# Patient Record
Sex: FEMALE | Race: WHITE | NOT HISPANIC OR LATINO | Employment: UNEMPLOYED | URBAN - METROPOLITAN AREA
[De-identification: names, ages, dates, MRNs, and addresses within clinical notes are randomized per-mention and may not be internally consistent; named-entity substitution may affect disease eponyms.]

---

## 2018-04-23 LAB
ALBUMIN SERPL BCP-MCNC: 4.6 G/DL (ref 3.5–5.7)
ALP SERPL-CCNC: 76 IU/L (ref 40–150)
ALT SERPL W P-5'-P-CCNC: 13 IU/L (ref 0–50)
ANION GAP SERPL CALCULATED.3IONS-SCNC: 12.1 MM/L
AST SERPL W P-5'-P-CCNC: 13 U/L (ref 7–26)
BASOPHILS # BLD AUTO: 0.1 X3/UL (ref 0–0.3)
BASOPHILS # BLD AUTO: 0.9 % (ref 0–2)
BILIRUB SERPL-MCNC: 0.5 MG/DL (ref 0.3–1)
BILIRUBIN DIRECT (HISTORICAL): 0.1 MG/DL (ref 0–0.2)
BUN SERPL-MCNC: 9 MG/DL (ref 7–25)
CALCIUM SERPL-MCNC: 9.4 MG/DL (ref 8.6–10.5)
CHLORIDE SERPL-SCNC: 102 MM/L (ref 98–107)
CHOLEST SERPL-MCNC: 223 MG/DL (ref 0–200)
CO2 SERPL-SCNC: 29 MM/L (ref 21–31)
CREAT SERPL-MCNC: 0.81 MG/DL (ref 0.6–1.2)
DEPRECATED RDW RBC AUTO: 15.2 % (ref 11.5–14.5)
EGFR (HISTORICAL): > 60 GFR
EGFR AFRICAN AMERICAN (HISTORICAL): > 60 GFR
EOSINOPHIL # BLD AUTO: 0.2 X3/UL (ref 0–0.5)
EOSINOPHIL NFR BLD AUTO: 1.9 % (ref 0–5)
EST. AVERAGE GLUCOSE BLD GHB EST-MCNC: 128 MG/DL
GLUCOSE (HISTORICAL): 115 MG/DL (ref 65–99)
HBA1C MFR BLD HPLC: 6.1 % (ref 4–6.2)
HCT VFR BLD AUTO: 39.8 % (ref 37–47)
HDLC SERPL-MCNC: 43 MG/DL (ref 40–60)
HGB BLD-MCNC: 12.9 G/DL (ref 12–16)
LDLC SERPL CALC-MCNC: 154.5 MG/DL (ref 75–193)
LYMPHOCYTES # BLD AUTO: 1.8 X3/UL (ref 1.2–4.2)
LYMPHOCYTES NFR BLD AUTO: 20.4 % (ref 20.5–51.1)
MCH RBC QN AUTO: 26.7 PG (ref 26–34)
MCHC RBC AUTO-ENTMCNC: 32.4 G/DL (ref 31–36)
MCV RBC AUTO: 82.2 FL (ref 81–99)
MONOCYTES # BLD AUTO: 0.5 X3/UL (ref 0–1)
MONOCYTES NFR BLD AUTO: 5.4 % (ref 1.7–12)
NEUTROPHILS # BLD AUTO: 6.2 X3/UL (ref 1.4–6.5)
NEUTS SEG NFR BLD AUTO: 71.4 % (ref 42.2–75.2)
OSMOLALITY, SERUM (HISTORICAL): 277 MOSM (ref 262–291)
PLATELET # BLD AUTO: 346 X3/UL (ref 130–400)
PMV BLD AUTO: 7.4 FL (ref 8.6–11.7)
POTASSIUM SERPL-SCNC: 4.1 MM/L (ref 3.5–5.5)
RBC # BLD AUTO: 4.84 X6/UL (ref 3.9–5.2)
SODIUM SERPL-SCNC: 139 MM/L (ref 134–143)
T3 SERPL-MCNC: 108.9 NG/DL (ref 87–178)
T4 TOTAL (HISTORICAL): 5.52 UG/DL (ref 6.1–12.2)
TOTAL PROTEIN (HISTORICAL): 8.2 G/DL (ref 6.4–8.9)
TRIGL SERPL-MCNC: 127 MG/DL (ref 44–166)
TSH SERPL DL<=0.05 MIU/L-ACNC: > 49.6 UIU/M (ref 0.45–5.33)
VLDL CHOLESTEROL (HISTORICAL): 25 MG/DL (ref 5–51)
WBC # BLD AUTO: 8.7 X3/UL (ref 4.8–10.8)

## 2018-05-01 ENCOUNTER — LAB REQUISITION (OUTPATIENT)
Dept: LAB | Facility: HOSPITAL | Age: 33
End: 2018-05-01
Payer: COMMERCIAL

## 2018-05-01 DIAGNOSIS — R92.8 OTHER ABNORMAL AND INCONCLUSIVE FINDINGS ON DIAGNOSTIC IMAGING OF BREAST: ICD-10-CM

## 2018-05-01 LAB — SURGICAL PATHOLOGY (HISTORICAL): NORMAL

## 2018-05-01 PROCEDURE — 88305 TISSUE EXAM BY PATHOLOGIST: CPT | Performed by: PATHOLOGY

## 2018-08-15 PROBLEM — J45.909 ASTHMA: Status: ACTIVE | Noted: 2018-08-15

## 2018-08-15 PROBLEM — I10 HYPERTENSION: Status: ACTIVE | Noted: 2018-08-15

## 2018-08-15 PROBLEM — M19.90 ARTHRITIS: Status: ACTIVE | Noted: 2018-08-15

## 2018-08-15 PROBLEM — K21.9 GERD (GASTROESOPHAGEAL REFLUX DISEASE): Status: ACTIVE | Noted: 2018-08-15

## 2018-08-15 RX ORDER — FLUTICASONE PROPIONATE 50 MCG
1 SPRAY, SUSPENSION (ML) NASAL DAILY PRN
COMMUNITY
End: 2019-07-23

## 2018-08-15 RX ORDER — LISINOPRIL 10 MG/1
10 TABLET ORAL DAILY
COMMUNITY
End: 2019-07-23

## 2018-08-15 RX ORDER — NAPROXEN 500 MG/1
500 TABLET ORAL DAILY PRN
COMMUNITY
End: 2019-07-23

## 2018-08-15 RX ORDER — PANTOPRAZOLE SODIUM 40 MG/1
40 TABLET, DELAYED RELEASE ORAL DAILY
COMMUNITY
End: 2018-09-26 | Stop reason: SDUPTHER

## 2018-08-15 RX ORDER — ATORVASTATIN CALCIUM 10 MG/1
10 TABLET, FILM COATED ORAL DAILY
COMMUNITY
End: 2018-09-26 | Stop reason: SDUPTHER

## 2018-08-15 RX ORDER — LEVOTHYROXINE SODIUM 0.2 MG/1
200 TABLET ORAL DAILY
COMMUNITY
End: 2018-09-26 | Stop reason: SDUPTHER

## 2018-08-15 RX ORDER — AMLODIPINE BESYLATE 10 MG/1
10 TABLET ORAL DAILY
COMMUNITY
End: 2018-09-26 | Stop reason: SDUPTHER

## 2018-08-15 RX ORDER — PAROXETINE HYDROCHLORIDE 40 MG/1
40 TABLET, FILM COATED ORAL DAILY
COMMUNITY
End: 2019-07-23

## 2018-08-15 RX ORDER — MONTELUKAST SODIUM 10 MG/1
10 TABLET ORAL
COMMUNITY
End: 2018-09-26 | Stop reason: SDUPTHER

## 2018-08-19 ENCOUNTER — OFFICE VISIT (OUTPATIENT)
Dept: URGENT CARE | Facility: CLINIC | Age: 33
End: 2018-08-19
Payer: COMMERCIAL

## 2018-08-19 VITALS
BODY MASS INDEX: 43.39 KG/M2 | WEIGHT: 270 LBS | OXYGEN SATURATION: 98 % | SYSTOLIC BLOOD PRESSURE: 135 MMHG | HEIGHT: 66 IN | DIASTOLIC BLOOD PRESSURE: 81 MMHG | RESPIRATION RATE: 16 BRPM | TEMPERATURE: 98.1 F | HEART RATE: 81 BPM

## 2018-08-19 DIAGNOSIS — K08.89 PAIN, DENTAL: Primary | ICD-10-CM

## 2018-08-19 PROCEDURE — 99203 OFFICE O/P NEW LOW 30 MIN: CPT | Performed by: PHYSICIAN ASSISTANT

## 2018-08-19 PROCEDURE — S9088 SERVICES PROVIDED IN URGENT: HCPCS | Performed by: PHYSICIAN ASSISTANT

## 2018-08-19 RX ORDER — AMOXICILLIN 500 MG/1
500 CAPSULE ORAL EVERY 8 HOURS SCHEDULED
Qty: 21 CAPSULE | Refills: 0 | Status: SHIPPED | OUTPATIENT
Start: 2018-08-19 | End: 2018-08-26

## 2018-08-19 NOTE — PATIENT INSTRUCTIONS
Take medications as directed  Drink plenty of fluids  Follow up with family doctor this week  Go to ER immediately if new or worsening symptoms occur  Toothache   WHAT YOU NEED TO KNOW:   A toothache is pain that is caused by irritation of the nerves in the center of your tooth  The irritation may be caused by several problems, such as a cavity, an infection, a cracked tooth, or gum disease  It is very important to follow up with your dentist so the cause of your toothache can be diagnosed and treated  This can help prevent more serious problems  DISCHARGE INSTRUCTIONS:   Medicines: You may  need any of the following:  · NSAIDs  decrease swelling and pain  This medicine can be bought with or without a doctor's order  This medicine can cause stomach bleeding or kidney problems in certain people  If you take blood thinner medicine, always ask your healthcare provider if NSAIDs are safe for you  Always read the medicine label and follow the directions on it before using this medicine  · Acetaminophen  decreases pain  It is available without a doctor's order  Ask how much to take and how often to take it  Follow directions  Acetaminophen can cause liver damage if not taken correctly  · Pain medicine  may be given as a pill or as medicine that you put directly on your tooth or gums  Do not wait until the pain is severe before you take this medicine  · Antibiotics  help fight or prevent an infection caused by bacteria  Take them as directed  · Take your medicine as directed  Contact your healthcare provider if you think your medicine is not helping or if you have side effects  Tell him of her if you are allergic to any medicine  Keep a list of the medicines, vitamins, and herbs you take  Include the amounts, and when and why you take them  Bring the list or the pill bottles to follow-up visits  Carry your medicine list with you in case of an emergency    Follow up with your dentist as directed: You may be referred to a dental surgeon  Write down your questions so you remember to ask them during your visits  Self-care:   · Rinse your mouth with warm salt water 4 times a day or as directed  · You may need to eat soft foods to help relieve pain caused by chewing  Contact your dentist if:   · You have questions or concerns about your condition or care  Return to the emergency department if:   · You have trouble breathing  · You have swelling in your face or neck  · You have a fever and chills  · You have trouble speaking or swallowing  · You have trouble opening or closing your mouth  © 2017 [facility] Information is for End User's use only and may not be sold, redistributed or otherwise used for commercial purposes  All illustrations and images included in CareNotes® are the copyrighted property of [employer]  or [employer]  The above information is an  only  It is not intended as medical advice for individual conditions or treatments  Talk to your doctor, nurse or pharmacist before following any medical regimen to see if it is safe and effective for you

## 2018-08-19 NOTE — PROGRESS NOTES
[facility]        NAME: Vanessa Gutierrez is a 26 y o  female  : 1985    MRN: [id number]  DATE: 2018  TIME: 12:17 PM    Assessment and Plan   Pain, dental [K08 89]  1  Pain, dental  amoxicillin (AMOXIL) 500 mg capsule         Patient Instructions       Take medications as directed  Drink plenty of fluids  Follow up with family doctor this week  Go to ER immediately if new or worsening symptoms occur  Chief Complaint     Chief Complaint   Patient presents with    Fever    Earache     left, x 3 days         History of Present Illness       Earache    There is pain in the left ear  This is a new problem  The current episode started yesterday  The problem occurs constantly  The problem has been gradually worsening  The maximum temperature recorded prior to her arrival was 101 - 101 9 F  The fever has been present for less than 1 day  The pain is at a severity of 6/10  The pain is moderate  Pertinent negatives include no abdominal pain, coughing, diarrhea, ear discharge, headaches, hearing loss, neck pain, rash, rhinorrhea, sore throat or vomiting  Associated symptoms comments: L lower jaw toothache  She has tried nothing for the symptoms  Review of Systems   Review of Systems   Constitutional: Negative for chills, diaphoresis, fatigue and fever  HENT: Positive for dental problem and ear pain  Negative for congestion, ear discharge, hearing loss, rhinorrhea, sinus pain, sinus pressure, sneezing, sore throat, tinnitus and voice change  Eyes: Negative  Respiratory: Negative for cough, chest tightness and shortness of breath  Cardiovascular: Negative for chest pain and palpitations  Gastrointestinal: Negative for abdominal pain, constipation, diarrhea, nausea and vomiting  Endocrine: Negative  Genitourinary: Negative for dysuria  Musculoskeletal: Negative for back pain, myalgias and neck pain  Skin: Negative for pallor and rash  Allergic/Immunologic: Negative  Neurological: Negative for dizziness, syncope and headaches  Hematological: Negative  Psychiatric/Behavioral: Negative            Current Medications       Current Outpatient Prescriptions:     amLODIPine (NORVASC) 10 mg tablet, Take 10 mg by mouth daily, Disp: , Rfl:     atorvastatin (LIPITOR) 10 mg tablet, Take 10 mg by mouth daily, Disp: , Rfl:     fluticasone (FLONASE) 50 mcg/act nasal spray, 1 spray into each nostril daily as needed for rhinitis Each nostril, Disp: , Rfl:     Fluticasone Furoate-Vilanterol (BREO ELLIPTA IN), Inhale 100 mcg daily 1 puff at same time each day, Disp: , Rfl:     levothyroxine 200 mcg tablet, Take 200 mcg by mouth daily, Disp: , Rfl:     lisinopril (ZESTRIL) 10 mg tablet, Take 10 mg by mouth daily, Disp: , Rfl:     metFORMIN (GLUCOPHAGE) 500 mg tablet, Take 500 mg by mouth daily, Disp: , Rfl:     montelukast (SINGULAIR) 10 mg tablet, Take 10 mg by mouth daily at bedtime, Disp: , Rfl:     naproxen (NAPROSYN) 500 mg tablet, Take 500 mg by mouth daily as needed for mild pain, Disp: , Rfl:     pantoprazole (PROTONIX) 40 mg tablet, Take 40 mg by mouth daily, Disp: , Rfl:     PARoxetine (PAXIL) 40 MG tablet, Take 40 mg by mouth daily, Disp: , Rfl:     amoxicillin (AMOXIL) 500 mg capsule, Take 1 capsule (500 mg total) by mouth every 8 (eight) hours for 7 days, Disp: 21 capsule, Rfl: 0    Current Allergies     Allergies as of 08/19/2018 - Reviewed 08/19/2018   Allergen Reaction Noted    Other  08/15/2018    Vancomycin  08/15/2018            The following portions of the patient's history were reviewed and updated as appropriate: allergies, current medications, past family history, past medical history, past social history, past surgical history and problem list      Past Medical History:   Diagnosis Date    Acute sinus infection     Anxiety     Asthma     Depression     GERD (gastroesophageal reflux disease)     Heel spur     right    MRSA (methicillin resistant Staphylococcus aureus)     right leg & right leg inner thigh    Neck pain     Obesity     Sleep disturbance        Past Surgical History:   Procedure Laterality Date     SECTION      x1 live birth   [facility] THYROIDECTOMY  1       Family History   Problem Relation Age of Onset    Cervical cancer Mother     Hypertension Mother     Hypertension Father          Medications have been verified  Objective   /81   Pulse 81   Temp 98 1 °F (36 7 °C)   Resp 16   Ht 5' 6" (1 676 m)   Wt 122 kg (270 lb)   SpO2 98%   BMI 43 58 kg/m²        Physical Exam     Physical Exam   Constitutional: She appears well-developed and well-nourished  No distress  HENT:   Head: Normocephalic and atraumatic  Right Ear: Hearing, tympanic membrane, external ear and ear canal normal  Tympanic membrane is not erythematous, not retracted and not bulging  Left Ear: Hearing, tympanic membrane, external ear and ear canal normal  Tympanic membrane is not erythematous, not retracted and not bulging  Nose: Nose normal    Mouth/Throat: Oropharynx is clear and moist  No oropharyngeal exudate  Eyes: Conjunctivae are normal  Right eye exhibits no discharge  Left eye exhibits no discharge  Neck: Normal range of motion  Neck supple  Cardiovascular: Normal rate, regular rhythm, normal heart sounds and intact distal pulses  Pulmonary/Chest: Effort normal and breath sounds normal  No respiratory distress  She has no wheezes  She has no rales  Lymphadenopathy:     She has no cervical adenopathy  Skin: Skin is warm  No rash noted  She is not diaphoretic  Nursing note and vitals reviewed

## 2018-09-26 DIAGNOSIS — I10 ESSENTIAL HYPERTENSION: Primary | ICD-10-CM

## 2018-09-26 DIAGNOSIS — K21.00 GASTROESOPHAGEAL REFLUX DISEASE WITH ESOPHAGITIS: ICD-10-CM

## 2018-09-26 DIAGNOSIS — J45.909 ASTHMA, UNSPECIFIED ASTHMA SEVERITY, UNSPECIFIED WHETHER COMPLICATED, UNSPECIFIED WHETHER PERSISTENT: ICD-10-CM

## 2018-09-26 DIAGNOSIS — E78.00 HYPERCHOLESTEROLEMIA: ICD-10-CM

## 2018-09-26 DIAGNOSIS — E03.9 HYPOTHYROIDISM, UNSPECIFIED TYPE: ICD-10-CM

## 2018-09-26 RX ORDER — ATORVASTATIN CALCIUM 10 MG/1
10 TABLET, FILM COATED ORAL DAILY
Qty: 30 TABLET | Refills: 2 | Status: SHIPPED | OUTPATIENT
Start: 2018-09-26 | End: 2019-07-23

## 2018-09-26 RX ORDER — LEVOTHYROXINE SODIUM 0.2 MG/1
200 TABLET ORAL DAILY
Qty: 30 TABLET | Refills: 2 | Status: SHIPPED | OUTPATIENT
Start: 2018-09-26 | End: 2019-07-23

## 2018-09-26 RX ORDER — PANTOPRAZOLE SODIUM 40 MG/1
TABLET, DELAYED RELEASE ORAL
Qty: 30 TABLET | Refills: 3 | OUTPATIENT
Start: 2018-09-26

## 2018-09-26 RX ORDER — AMLODIPINE BESYLATE 10 MG/1
10 TABLET ORAL DAILY
Qty: 30 TABLET | Refills: 3 | Status: SHIPPED | OUTPATIENT
Start: 2018-09-26 | End: 2019-07-23

## 2018-09-26 RX ORDER — MONTELUKAST SODIUM 10 MG/1
TABLET ORAL
Qty: 30 TABLET | Refills: 3 | OUTPATIENT
Start: 2018-09-26

## 2018-09-26 RX ORDER — LEVOTHYROXINE SODIUM 0.2 MG/1
TABLET ORAL
Qty: 30 TABLET | Refills: 3 | OUTPATIENT
Start: 2018-09-26

## 2018-09-26 RX ORDER — ATORVASTATIN CALCIUM 10 MG/1
TABLET, FILM COATED ORAL
Qty: 30 TABLET | Refills: 3 | OUTPATIENT
Start: 2018-09-26

## 2018-09-26 RX ORDER — PANTOPRAZOLE SODIUM 40 MG/1
40 TABLET, DELAYED RELEASE ORAL DAILY
Qty: 30 TABLET | Refills: 2 | Status: SHIPPED | OUTPATIENT
Start: 2018-09-26 | End: 2019-07-23

## 2018-09-26 RX ORDER — MONTELUKAST SODIUM 10 MG/1
10 TABLET ORAL
Qty: 30 TABLET | Refills: 2 | Status: SHIPPED | OUTPATIENT
Start: 2018-09-26 | End: 2019-07-23

## 2018-09-26 RX ORDER — AMLODIPINE BESYLATE 10 MG/1
TABLET ORAL
Qty: 30 TABLET | Refills: 3 | OUTPATIENT
Start: 2018-09-26

## 2019-01-02 ENCOUNTER — APPOINTMENT (EMERGENCY)
Dept: CT IMAGING | Facility: HOSPITAL | Age: 34
End: 2019-01-02
Payer: COMMERCIAL

## 2019-01-02 ENCOUNTER — APPOINTMENT (EMERGENCY)
Dept: RADIOLOGY | Facility: HOSPITAL | Age: 34
End: 2019-01-02
Payer: COMMERCIAL

## 2019-01-02 ENCOUNTER — HOSPITAL ENCOUNTER (EMERGENCY)
Facility: HOSPITAL | Age: 34
Discharge: HOME | End: 2019-01-02
Attending: EMERGENCY MEDICINE
Payer: COMMERCIAL

## 2019-01-02 VITALS
DIASTOLIC BLOOD PRESSURE: 98 MMHG | OXYGEN SATURATION: 98 % | HEIGHT: 66 IN | BODY MASS INDEX: 41.78 KG/M2 | TEMPERATURE: 97.6 F | WEIGHT: 260 LBS | RESPIRATION RATE: 16 BRPM | SYSTOLIC BLOOD PRESSURE: 149 MMHG | HEART RATE: 90 BPM

## 2019-01-02 DIAGNOSIS — E03.9 HYPOTHYROIDISM: Primary | ICD-10-CM

## 2019-01-02 DIAGNOSIS — Z91.14 NONCOMPLIANCE WITH MEDICATION REGIMEN: ICD-10-CM

## 2019-01-02 DIAGNOSIS — I10 HYPERTENSION: ICD-10-CM

## 2019-01-02 DIAGNOSIS — R07.89 ATYPICAL CHEST PAIN: ICD-10-CM

## 2019-01-02 LAB
ALBUMIN SERPL BCP-MCNC: 4.3 G/DL (ref 3.5–5.7)
ALP SERPL-CCNC: 69 U/L (ref 40–150)
ALT SERPL W P-5'-P-CCNC: 10 U/L (ref 7–52)
ANION GAP SERPL CALCULATED.3IONS-SCNC: 7 MMOL/L (ref 4–13)
APTT PPP: 35 SECONDS (ref 26–38)
AST SERPL W P-5'-P-CCNC: 12 U/L (ref 13–39)
ATRIAL RATE: 88 BPM
BASOPHILS # BLD AUTO: 0.1 THOUSANDS/ΜL (ref 0–0.1)
BASOPHILS NFR BLD AUTO: 1 % (ref 0–2)
BILIRUB SERPL-MCNC: 0.3 MG/DL (ref 0.2–1)
BUN SERPL-MCNC: 12 MG/DL (ref 7–25)
CALCIUM SERPL-MCNC: 9.3 MG/DL (ref 8.6–10.5)
CHLORIDE SERPL-SCNC: 102 MMOL/L (ref 98–107)
CO2 SERPL-SCNC: 27 MMOL/L (ref 21–31)
CREAT SERPL-MCNC: 0.85 MG/DL (ref 0.6–1.2)
EOSINOPHIL # BLD AUTO: 0.2 THOUSAND/ΜL (ref 0–0.61)
EOSINOPHIL NFR BLD AUTO: 2 % (ref 0–5)
ERYTHROCYTE [DISTWIDTH] IN BLOOD BY AUTOMATED COUNT: 17.1 % (ref 11.5–14.5)
EXT PREG TEST URINE: NEGATIVE
GFR SERPL CREATININE-BSD FRML MDRD: 89 ML/MIN/1.73SQ M
GLUCOSE SERPL-MCNC: 119 MG/DL (ref 65–99)
HCT VFR BLD AUTO: 40.6 % (ref 34.8–46.1)
HGB BLD-MCNC: 13.1 G/DL (ref 12–16)
INR PPP: 0.96 (ref 0.9–1.5)
LYMPHOCYTES # BLD AUTO: 1.8 THOUSANDS/ΜL (ref 0.6–4.47)
LYMPHOCYTES NFR BLD AUTO: 21 % (ref 21–51)
MCH RBC QN AUTO: 25.9 PG (ref 26–34)
MCHC RBC AUTO-ENTMCNC: 32.4 G/DL (ref 31–37)
MCV RBC AUTO: 80 FL (ref 81–99)
MONOCYTES # BLD AUTO: 0.4 THOUSAND/ΜL (ref 0.17–1.22)
MONOCYTES NFR BLD AUTO: 5 % (ref 2–12)
NEUTROPHILS # BLD AUTO: 5.8 THOUSANDS/ΜL (ref 1.4–6.5)
NEUTS SEG NFR BLD AUTO: 71 % (ref 42–75)
NRBC BLD AUTO-RTO: 0 /100 WBCS
P AXIS: 43 DEGREES
PLATELET # BLD AUTO: 324 THOUSANDS/UL (ref 149–390)
PMV BLD AUTO: 7.4 FL (ref 8.6–11.7)
POTASSIUM SERPL-SCNC: 4.3 MMOL/L (ref 3.5–5.5)
PR INTERVAL: 124 MS
PROT SERPL-MCNC: 8 G/DL (ref 6.4–8.9)
PROTHROMBIN TIME: 11.1 SECONDS (ref 10.2–13)
QRS AXIS: 19 DEGREES
QRSD INTERVAL: 76 MS
QT INTERVAL: 346 MS
QTC INTERVAL: 418 MS
RBC # BLD AUTO: 5.08 MILLION/UL (ref 3.9–5.2)
SODIUM SERPL-SCNC: 136 MMOL/L (ref 134–143)
T WAVE AXIS: 12 DEGREES
TROPONIN I SERPL-MCNC: <0.03 NG/ML
TSH SERPL DL<=0.05 MIU/L-ACNC: >47.6 UIU/ML (ref 0.45–5.33)
VENTRICULAR RATE: 88 BPM
WBC # BLD AUTO: 8.2 THOUSAND/UL (ref 4.8–10.8)

## 2019-01-02 PROCEDURE — 84443 ASSAY THYROID STIM HORMONE: CPT | Performed by: PHYSICIAN ASSISTANT

## 2019-01-02 PROCEDURE — 85610 PROTHROMBIN TIME: CPT | Performed by: PHYSICIAN ASSISTANT

## 2019-01-02 PROCEDURE — 93010 ELECTROCARDIOGRAM REPORT: CPT | Performed by: INTERNAL MEDICINE

## 2019-01-02 PROCEDURE — 99285 EMERGENCY DEPT VISIT HI MDM: CPT

## 2019-01-02 PROCEDURE — 80053 COMPREHEN METABOLIC PANEL: CPT | Performed by: PHYSICIAN ASSISTANT

## 2019-01-02 PROCEDURE — 84484 ASSAY OF TROPONIN QUANT: CPT | Performed by: PHYSICIAN ASSISTANT

## 2019-01-02 PROCEDURE — 81025 URINE PREGNANCY TEST: CPT | Performed by: PHYSICIAN ASSISTANT

## 2019-01-02 PROCEDURE — 70450 CT HEAD/BRAIN W/O DYE: CPT

## 2019-01-02 PROCEDURE — 93005 ELECTROCARDIOGRAM TRACING: CPT

## 2019-01-02 PROCEDURE — 85025 COMPLETE CBC W/AUTO DIFF WBC: CPT | Performed by: PHYSICIAN ASSISTANT

## 2019-01-02 PROCEDURE — 85730 THROMBOPLASTIN TIME PARTIAL: CPT | Performed by: PHYSICIAN ASSISTANT

## 2019-01-02 PROCEDURE — 71045 X-RAY EXAM CHEST 1 VIEW: CPT

## 2019-01-02 PROCEDURE — 36415 COLL VENOUS BLD VENIPUNCTURE: CPT | Performed by: PHYSICIAN ASSISTANT

## 2019-01-02 RX ORDER — LEVOTHYROXINE SODIUM 0.07 MG/1
75 TABLET ORAL DAILY
Qty: 30 TABLET | Refills: 1 | Status: SHIPPED | OUTPATIENT
Start: 2019-01-02 | End: 2019-07-23

## 2019-01-02 RX ORDER — METHYLPREDNISOLONE 4 MG/1
TABLET ORAL
Qty: 21 TABLET | Refills: 0 | Status: SHIPPED | OUTPATIENT
Start: 2019-01-02 | End: 2019-07-23

## 2019-01-02 RX ORDER — AMLODIPINE BESYLATE 10 MG/1
10 TABLET ORAL DAILY
Qty: 30 TABLET | Refills: 1 | Status: SHIPPED | OUTPATIENT
Start: 2019-01-02 | End: 2019-07-23

## 2019-01-02 NOTE — ED PROVIDER NOTES
History  Chief Complaint   Patient presents with    Numbness     left side of face, left lower extremity, started at 1 today    Chest Pain     started at 1 today     Patient is a 58-year-old female with a history of hypertension and hypothyroidism who has not been taking her medications for the last 2 months because her insurance ran out  She went back to work today while at work she was standing there and developed numbness to the left side of her body and then left-sided chest pain  At that time she did not have a headache dizziness facial drooping or slurred speech upon presentation to the emergency department she still does complain of left-sided numbness to her arm and leg as well as improved left-sided chest pain  Patient does have a history of anxiety and has had similar panic attacks in the past where she developed numbness although she states that this feels slightly different  She currently denies headache dizziness fevers chills cough shortness of breath hemoptysis nausea vomiting diarrhea diaphoresis neck or back pain  Prior to Admission Medications   Prescriptions Last Dose Informant Patient Reported? Taking?    Fluticasone Furoate-Vilanterol (BREO ELLIPTA IN) Not Taking at Unknown time  Yes No   Sig: Inhale 100 mcg daily 1 puff at same time each day   PARoxetine (PAXIL) 40 MG tablet Not Taking at Unknown time  Yes No   Sig: Take 40 mg by mouth daily   amLODIPine (NORVASC) 10 mg tablet Not Taking at Unknown time  No No   Sig: Take 1 tablet (10 mg total) by mouth daily   Patient not taking: Reported on 2019    atorvastatin (LIPITOR) 10 mg tablet Not Taking at Unknown time  No No   Sig: Take 1 tablet (10 mg total) by mouth daily   Patient not taking: Reported on 2019    fluticasone (FLONASE) 50 mcg/act nasal spray Not Taking at Unknown time  Yes No   Si spray into each nostril daily as needed for rhinitis Each nostril   levothyroxine 200 mcg tablet Not Taking at Unknown time  No No   Sig: Take 1 tablet (200 mcg total) by mouth daily   Patient not taking: Reported on 2019    lisinopril (ZESTRIL) 10 mg tablet Not Taking at Unknown time  Yes No   Sig: Take 10 mg by mouth daily   metFORMIN (GLUCOPHAGE) 500 mg tablet Not Taking at Unknown time  Yes No   Sig: Take 500 mg by mouth daily   montelukast (SINGULAIR) 10 mg tablet Not Taking at Unknown time  No No   Sig: Take 1 tablet (10 mg total) by mouth daily at bedtime   Patient not taking: Reported on 2019    naproxen (NAPROSYN) 500 mg tablet Not Taking at Unknown time  Yes No   Sig: Take 500 mg by mouth daily as needed for mild pain   pantoprazole (PROTONIX) 40 mg tablet Not Taking at Unknown time  No No   Sig: Take 1 tablet (40 mg total) by mouth daily   Patient not taking: Reported on 2019       Facility-Administered Medications: None       Past Medical History:   Diagnosis Date    Acute sinus infection     Anxiety     Asthma     Depression     Disease of thyroid gland     GERD (gastroesophageal reflux disease)     Heel spur     right    Hypertension     MRSA (methicillin resistant Staphylococcus aureus)     right leg & right leg inner thigh    Neck pain     Obesity     Sleep disturbance        Past Surgical History:   Procedure Laterality Date     SECTION      x1 live birth   [facility] THYROIDECTOMY  1       Family History   Problem Relation Age of Onset    Cervical cancer Mother     Hypertension Mother     Hypertension Father      I have reviewed and agree with the history as documented  Social History   Substance Use Topics    Smoking status: Never Smoker    Smokeless tobacco: Never Used    Alcohol use Yes      Comment: socially        Review of Systems   Constitutional: Negative for chills, diaphoresis, fatigue and fever  HENT: Negative for congestion, ear pain, rhinorrhea, sneezing and sore throat  Respiratory: Negative for cough, shortness of breath, wheezing and stridor  Cardiovascular: Positive for chest pain  Negative for palpitations and leg swelling  Gastrointestinal: Negative for abdominal distention, abdominal pain, blood in stool, constipation, diarrhea, nausea and vomiting  Genitourinary: Negative for difficulty urinating, dysuria, frequency, hematuria and urgency  Musculoskeletal: Negative for arthralgias, back pain, gait problem, joint swelling, myalgias and neck pain  Skin: Negative for rash  Neurological: Positive for numbness  Negative for dizziness, syncope, weakness, light-headedness and headaches  All other systems reviewed and are negative  Physical Exam  Physical Exam   Constitutional: She is oriented to person, place, and time  She appears well-developed and well-nourished  No distress  HENT:   Head: Normocephalic and atraumatic  Right Ear: External ear normal    Left Ear: External ear normal    Nose: Nose normal    Mouth/Throat: Oropharynx is clear and moist    Eyes: Pupils are equal, round, and reactive to light  Conjunctivae and EOM are normal    Neck: Normal range of motion  Neck supple  No thyromegaly present  Cardiovascular: Normal rate, regular rhythm and normal heart sounds  Exam reveals no gallop and no friction rub  No murmur heard  Pulmonary/Chest: Effort normal and breath sounds normal  No stridor  No respiratory distress  She has no wheezes  She has no rales  She exhibits no tenderness  Abdominal: Soft  Bowel sounds are normal  She exhibits no distension and no mass  There is no tenderness  There is no rebound and no guarding  No hernia  Musculoskeletal: She exhibits no edema, tenderness or deformity  Lymphadenopathy:     She has no cervical adenopathy  Neurological: She is alert and oriented to person, place, and time  She displays normal reflexes  No cranial nerve deficit or sensory deficit  She exhibits normal muscle tone  Coordination normal  GCS eye subscore is 4  GCS verbal subscore is 5   GCS motor subscore is 6    Skin: She is not diaphoretic  Psychiatric: She has a normal mood and affect  Her behavior is normal    Nursing note and vitals reviewed  Vital Signs  ED Triage Vitals   Temperature Pulse Respirations Blood Pressure SpO2   01/02/19 1004 01/02/19 1004 01/02/19 1004 01/02/19 1004 01/02/19 1004   97 6 °F (36 4 °C) 88 16 (!) 160/112 100 %      Temp Source Heart Rate Source Patient Position - Orthostatic VS BP Location FiO2 (%)   01/02/19 1004 01/02/19 1218 01/02/19 1218 01/02/19 1218 --   Temporal Monitor Sitting Left arm       Pain Score       01/02/19 1004       5           Vitals:    01/02/19 1045 01/02/19 1200 01/02/19 1215 01/02/19 1218   BP:  149/98  149/98   Pulse: 101 91 95 90   Patient Position - Orthostatic VS:    Sitting       Visual Acuity  Visual Acuity      Most Recent Value   L Pupil Size (mm)  3   R Pupil Size (mm)  3          ED Medications  Medications - No data to display    Diagnostic Studies  Results Reviewed     Procedure Component Value Units Date/Time    TSH [[id number]]  (Abnormal) Collected:  01/02/19 1043    Lab Status:  Final result Specimen:  Blood from Arm, Right Updated:  01/02/19 1135     TSH 3RD GENERATON >47 600 (H) uIU/mL     Narrative:         Patients undergoing fluorescein dye angiography may retain small amounts of fluorescein in the body for 48-72 hours post procedure  Samples containing fluorescein can produce falsely depressed TSH values  If the patient had this procedure,a specimen should be resubmitted post fluorescein clearance            The recommended reference ranges for TSH during pregnancy are as follows:  First trimester 0 1 to 2 5 uIU/mL  Second trimester  0 2 to 3 0 uIU/mL  Third trimester 0 3 to 3 0 uIU/m      Troponin I [[id number]]  (Normal) Collected:  01/02/19 1043    Lab Status:  Final result Specimen:  Blood from Arm, Right Updated:  01/02/19 1114     Troponin I <0 03 ng/mL     Comprehensive metabolic panel [[id number]]  (Abnormal) Collected: 01/02/19 1043    Lab Status:  Final result Specimen:  Blood from Arm, Right Updated:  01/02/19 1110     Sodium 136 mmol/L      Potassium 4 3 mmol/L      Chloride 102 mmol/L      CO2 27 mmol/L      ANION GAP 7 mmol/L      BUN 12 mg/dL      Creatinine 0 85 mg/dL      Glucose 119 (H) mg/dL      Calcium 9 3 mg/dL      AST 12 (L) U/L      ALT 10 U/L      Alkaline Phosphatase 69 U/L      Total Protein 8 0 g/dL      Albumin 4 3 g/dL      Total Bilirubin 0 30 mg/dL      eGFR 89 ml/min/1 73sq m     Narrative:         National Kidney Disease Education Program recommendations are as follows:  GFR calculation is accurate only with a steady state creatinine  Chronic Kidney disease less than 60 ml/min/1 73 sq  meters  Kidney failure less than 15 ml/min/1 73 sq  meters      Protime-INR [[id number]]  (Normal) Collected:  01/02/19 1043    Lab Status:  Final result Specimen:  Blood from Arm, Right Updated:  01/02/19 1103     Protime 11 1 seconds      INR 0 96    APTT [[id number]]  (Normal) Collected:  01/02/19 1043    Lab Status:  Final result Specimen:  Blood from Arm, Right Updated:  01/02/19 1103     PTT 35 seconds     CBC and differential [[id number]]  (Abnormal) Collected:  01/02/19 1043    Lab Status:  Final result Specimen:  Blood from Arm, Right Updated:  01/02/19 1102     WBC 8 20 Thousand/uL      RBC 5 08 Million/uL      Hemoglobin 13 1 g/dL      Hematocrit 40 6 %      MCV 80 (L) fL      MCH 25 9 (L) pg      MCHC 32 4 g/dL      RDW 17 1 (H) %      MPV 7 4 (L) fL      Platelets 232 Thousands/uL      nRBC 0 /100 WBCs      Neutrophils Relative 71 %      Lymphocytes Relative 21 %      Monocytes Relative 5 %      Eosinophils Relative 2 %      Basophils Relative 1 %      Neutrophils Absolute 5 80 Thousands/µL      Lymphocytes Absolute 1 80 Thousands/µL      Monocytes Absolute 0 40 Thousand/µL      Eosinophils Absolute 0 20 Thousand/µL      Basophils Absolute 0 10 Thousands/µL     POCT pregnancy, urine [[id number]]  (Normal) Resulted: 01/02/19 1051    Lab Status:  Final result Updated:  01/02/19 1052     EXT PREG TEST UR (Ref: Negative) negative                 CT head without contrast   Final Result by Yas Spears MD (01/02 1121)      No acute intracranial abnormality  Workstation performed: [id number]         XR chest 1 view portable   Final Result by Yas Spears MD (01/02 1114)      No acute cardiopulmonary disease              Workstation performed: [id number]                    Procedures  ECG 12 Lead Documentation  Date/Time: 1/2/2019 10:38 AM  Performed by: Sarah Pham by: Suly Rodas     Indications / Diagnosis:  Chest pain  ECG reviewed by me, the ED Provider: yes    Patient location:  ED  Previous ECG:     Previous ECG:  Unavailable    Comparison to cardiac monitor: Yes    Interpretation:     Interpretation: normal    Rate:     ECG rate:  88    ECG rate assessment: normal    Rhythm:     Rhythm: sinus rhythm    Ectopy:     Ectopy: none    QRS:     QRS axis:  Normal    QRS intervals:  Normal  Conduction:     Conduction: normal    ST segments:     ST segments:  Normal           Phone Contacts  ED Phone Contact    ED Course         HEART Risk Score      Most Recent Value   History  0 Filed at: 01/02/2019 1128   ECG  0 Filed at: 01/02/2019 1128   Age  0 Filed at: 01/02/2019 1128   Risk Factors  1 Filed at: 01/02/2019 1128   Troponin  0 Filed at: 01/02/2019 1128   Heart Score Risk Calculator   History  0 Filed at: 01/02/2019 1128   ECG  0 Filed at: 01/02/2019 1128   Age  0 Filed at: 01/02/2019 1128   Risk Factors  1 Filed at: 01/02/2019 1128   Troponin  0 Filed at: 01/02/2019 1128   HEART Score  1 Filed at: 01/02/2019 1128   HEART Score  1 Filed at: 01/02/2019 1128                            MDM  Number of Diagnoses or Management Options  Atypical chest pain:   Hypertension:   Hypothyroidism:   Noncompliance with medication regimen:   Diagnosis management comments: Patient resting comfortably initial workup is essentially unremarkable for any serious acute pathology although her TSH is elevated greater than 47 which she has not been taking her Synthroid since the end of October  In addition she has remained mildly hypertensive in the 160s over 90s will write her prescription for lisinopril 10 mg p  O  Daily and advised that she follow up with her primary care physician now which her insurance is reactivated  Patient has remained clinically and hemodynamically stable throughout her ER course her vitals have remained within normal limits and she is stable for discharge to home and outpatient follow-up return precautions and anticipatory guidance was discussed with patient and she verbalized understanding  CritCare Time    Disposition  Final diagnoses:   Hypothyroidism   Hypertension   Atypical chest pain   Noncompliance with medication regimen     Time reflects when diagnosis was documented in both MDM as applicable and the Disposition within this note     Time User Action Codes Description Comment    1/2/2019 12:06 PM Alexis Wallace [E03 9] Hypothyroidism     1/2/2019 12:07 PM Suyapa Mott 20 Hypertension     1/2/2019 12:07 PM Alexis Wallace [R07 89] Atypical chest pain     1/2/2019 12:07 PM Alexis Wallace [Z91 14] Noncompliance with medication regimen       ED Disposition     ED Disposition Condition Comment    Discharge  Ellen Thompson Beck discharge to home/self care      Condition at discharge: Good        Follow-up Information     Follow up With Specialties Details Why [address] [facility]  Emergency Department Emergency Medicine  As needed [address] [facility] [id number] [phone]          Discharge Medication List as of 1/2/2019 12:10 PM      START taking these medications    Details   !! amLODIPine (NORVASC) 10 mg tablet Take 1 tablet (10 mg total) by mouth daily, Starting Wed 1/2/2019, Print      !! levothyroxine 75 mcg tablet Take 1 tablet (75 mcg total) by mouth daily, Starting Wed 1/2/2019, Print      Methylprednisolone 4 MG TBPK Use as directed on package, Print       !! - Potential duplicate medications found  Please discuss with provider  CONTINUE these medications which have NOT CHANGED    Details   !! amLODIPine (NORVASC) 10 mg tablet Take 1 tablet (10 mg total) by mouth daily, Starting Wed 9/26/2018, Normal      atorvastatin (LIPITOR) 10 mg tablet Take 1 tablet (10 mg total) by mouth daily, Starting Wed 9/26/2018, Normal      fluticasone (FLONASE) 50 mcg/act nasal spray 1 spray into each nostril daily as needed for rhinitis Each nostril, Historical Med      Fluticasone Furoate-Vilanterol (BREO ELLIPTA IN) Inhale 100 mcg daily 1 puff at same time each day, Historical Med      !! levothyroxine 200 mcg tablet Take 1 tablet (200 mcg total) by mouth daily, Starting Wed 9/26/2018, Normal      lisinopril (ZESTRIL) 10 mg tablet Take 10 mg by mouth daily, Historical Med      metFORMIN (GLUCOPHAGE) 500 mg tablet Take 500 mg by mouth daily, Historical Med      montelukast (SINGULAIR) 10 mg tablet Take 1 tablet (10 mg total) by mouth daily at bedtime, Starting Wed 9/26/2018, Normal      naproxen (NAPROSYN) 500 mg tablet Take 500 mg by mouth daily as needed for mild pain, Historical Med      pantoprazole (PROTONIX) 40 mg tablet Take 1 tablet (40 mg total) by mouth daily, Starting Wed 9/26/2018, Normal      PARoxetine (PAXIL) 40 MG tablet Take 40 mg by mouth daily, Historical Med       !! - Potential duplicate medications found  Please discuss with provider  No discharge procedures on file      ED Provider  Electronically Signed by           Cr Mcadams PA-C  01/02/19 5098

## 2019-01-02 NOTE — DISCHARGE INSTRUCTIONS
Chest Pain, Ambulatory Care   GENERAL INFORMATION:   Chest pain  can be caused by a range of conditions, from not serious to life-threatening  It may be caused by a heart attack or a blood clot in your lungs  Sometimes chest pain or pressure is caused by poor blood flow to your heart (angina)  Infection, inflammation, or a fracture in the bones or cartilage in your chest can cause pain or discomfort  Chest pain can also be a symptom of a digestive problem, such as acid reflux or a stomach ulcer  Common symptoms include the following:   · Fever or sweating     · Nausea or vomiting     · Shortness of breath     · Discomfort or pressure that spreads from your chest to your back, jaw, or arm     · A racing or slow heartbeat     · Feeling weak, tired, or faint  Seek immediate care for the following symptoms:   · Any of the following signs of a heart attack:      ¨ Squeezing, pressure, or pain in your chest that lasts longer than 5 minutes or returns    ¨ Discomfort or pain in your back, neck, jaw, stomach, or arm     ¨ Trouble breathing     ¨ Nausea or vomiting    ¨ Lightheadedness or a sudden cold sweat, especially with trouble breathing         · Chest discomfort that gets worse, even with medicine    · Coughing or vomiting blood    · Black or bloody bowel movements     · Vomiting that does not stop, or pain when you swallow  Treatment for chest pain  may include medicine to treat your symptoms while he determines the cause of your chest pain  You may also need any of the following:  · Antiplatelets , such as aspirin, help prevent blood clots  Take your antiplatelet medicine exactly as directed  These medicines make it more likely for you to bleed or bruise  If you are told to take aspirin, do not take acetaminophen or ibuprofen instead  · Prescription pain medicine  may be given  Ask how to take this medicine safely  Do not smoke: If you smoke, it is never too late to quit   Smoking increases your risk for a heart attack and other heart and lung conditions  Ask your healthcare provider for information about how to stop smoking if you need help  Follow up with your healthcare provider as directed: You may need more tests  You may be referred to a specialist, such as a cardiologist or gastroenterologist  Write down your questions so you remember to ask them during your visits  CARE AGREEMENT:   You have the right to help plan your care  Learn about your health condition and how it may be treated  Discuss treatment options with your caregivers to decide what care you want to receive  You always have the right to refuse treatment  The above information is an  only  It is not intended as medical advice for individual conditions or treatments  Talk to your doctor, nurse or pharmacist before following any medical regimen to see if it is safe and effective for you  © 2014 [address] is for End User's use only and may not be sold, redistributed or otherwise used for commercial purposes  All illustrations and images included in CareNotes® are the copyrighted property of [employer]  or [employer]  Chronic Hypertension, Ambulatory Care   GENERAL INFORMATION:   Chronic hypertension  is a long-term condition in which your blood pressure (BP) is higher than normal  Your BP is the force of your blood moving against the walls of your arteries  Hypertension is a BP of 140/90 or higher     Common symptoms include the following:   · Headache     · Blurred vision    · Chest pain     · Dizziness or weakness     · Trouble breathing     · Nosebleeds  Seek immediate care for the following symptoms:   · Severe headache or vision loss    · Weakness in an arm or leg    · Confusion or difficulty speaking    · Discomfort in your chest that feels like squeezing, pressure, fullness, or pain    · Suddenly feeling lightheaded or trouble breathing    · Pain or discomfort in your back, neck, jaw, stomach, or arm  Treatment for chronic hypertension  may include medicine to lower your BP  You may also need to make lifestyle changes  Take your medicine exactly as directed  Manage chronic hypertension:   · Take your BP at home  Sit and rest for 5 minutes before you take your BP  Extend your arm and support it on a flat surface  Your arm should be at the same level as your heart  Follow the directions that came with your BP monitor  If possible, take at least 2 BP readings each time  Take your BP at least twice a day at the same times each day, such as morning and evening  Keep a log of your BP readings and bring it to your follow-up visits  · Eat less sodium (salt)  Do not add sodium to your food  Limit foods that are high in sodium, such as canned foods, potato chips, and cold cuts  Your healthcare provider may suggest that you follow the [address] [place] [address] Street  The plan is low in sodium, unhealthy fats, and total fat  It is high in potassium, calcium, and fiber  · Exercise regularly  Exercise at least 30 minutes per day, on most days of the week  This will help decrease your BP  Ask your healthcare provider about the best exercise plan for you  · Limit alcohol  Women should limit alcohol to 1 drink a day  Men should limit alcohol to 2 drinks a day  A drink of alcohol is 12 ounces of beer, 5 ounces of wine, or 1½ ounces of liquor  · Do not smoke  If you smoke, it is never too late to quit  Smoking can increase your BP  Smoking also worsens other health conditions you may have that can increase your risk for hypertension  Ask your healthcare provider for information if you need help quitting  Follow up with your healthcare provider as directed: You will need to return to have your BP checked and to have other lab tests done  Write down your questions so you remember to ask them during your visits  CARE AGREEMENT:   You have the right to help plan your care   Learn about your health condition and how it may be treated  Discuss treatment options with your caregivers to decide what care you want to receive  You always have the right to refuse treatment  The above information is an  only  It is not intended as medical advice for individual conditions or treatments  Talk to your doctor, nurse or pharmacist before following any medical regimen to see if it is safe and effective for you  © 2014 [address] is for End User's use only and may not be sold, redistributed or otherwise used for commercial purposes  All illustrations and images included in CareNotes® are the copyrighted property of [employer] M [employer]  or [employer]

## 2019-01-14 ENCOUNTER — OFFICE VISIT (OUTPATIENT)
Dept: URGENT CARE | Facility: CLINIC | Age: 34
End: 2019-01-14
Payer: COMMERCIAL

## 2019-01-14 VITALS
HEART RATE: 87 BPM | RESPIRATION RATE: 18 BRPM | TEMPERATURE: 98.1 F | OXYGEN SATURATION: 99 % | DIASTOLIC BLOOD PRESSURE: 77 MMHG | SYSTOLIC BLOOD PRESSURE: 151 MMHG

## 2019-01-14 DIAGNOSIS — K02.9 DENTAL CARIES: ICD-10-CM

## 2019-01-14 DIAGNOSIS — J01.00 ACUTE NON-RECURRENT MAXILLARY SINUSITIS: Primary | ICD-10-CM

## 2019-01-14 PROCEDURE — 99213 OFFICE O/P EST LOW 20 MIN: CPT | Performed by: FAMILY MEDICINE

## 2019-01-14 PROCEDURE — S9088 SERVICES PROVIDED IN URGENT: HCPCS | Performed by: FAMILY MEDICINE

## 2019-01-14 RX ORDER — AMOXICILLIN AND CLAVULANATE POTASSIUM 875; 125 MG/1; MG/1
1 TABLET, FILM COATED ORAL EVERY 12 HOURS SCHEDULED
Qty: 20 TABLET | Refills: 0 | Status: SHIPPED | OUTPATIENT
Start: 2019-01-14 | End: 2019-01-24

## 2019-01-14 NOTE — PATIENT INSTRUCTIONS
Plain Mucinex for congestion  Call [address] link to get established with the dental clinic in [place]  Follow up with PCP in 3-5 days  Proceed to  ER if symptoms worsen  Sinusitis   AMBULATORY CARE:   Sinusitis  is inflammation or infection of your sinuses  It is most often caused by a virus  Acute sinusitis may last up to 12 weeks  Chronic sinusitis lasts longer than 12 weeks  Recurrent sinusitis means you have 4 or more times in 1 year  Common symptoms include the following:   · Fever    · Pain, pressure, redness, or swelling around the forehead, cheeks, or eyes    · Thick yellow or green discharge from your nose    · Tenderness when you touch your face over your sinuses    · Dry cough that happens mostly at night or when you lie down    · Headache and face pain that is worse when you lean forward    · Tooth pain, or pain when you chew  Seek care immediately if:   · Your eye and eyelid are red, swollen, and painful  · You cannot open your eye  · You have vision changes, such as double vision  · Your eyeball bulges out or you cannot move your eye  · You are more sleepy than normal, or you notice changes in your ability to think, move, or talk  · You have a stiff neck, a fever, or a bad headache  · You have swelling of your forehead or scalp  Contact your healthcare provider if:   · Your symptoms do not improve after 3 days  · Your symptoms do not go away after 10 days  · You have nausea and are vomiting  · Your nose is bleeding  · You have questions or concerns about your condition or care  Treatment for sinusitis:  Your symptoms may go away on their own  Your healthcare provider may recommend watchful waiting for up to 10 days before starting antibiotics  You may  need any of the following:  · Acetaminophen  decreases pain and fever  It is available without a doctor's order  Ask how much to take and how often to take it  Follow directions   Read the labels of all other medicines you are using to see if they also contain acetaminophen, or ask your doctor or pharmacist  Acetaminophen can cause liver damage if not taken correctly  Do not use more than 4 grams (4,000 milligrams) total of acetaminophen in one day  · NSAIDs , such as ibuprofen, help decrease swelling, pain, and fever  This medicine is available with or without a doctor's order  NSAIDs can cause stomach bleeding or kidney problems in certain people  If you take blood thinner medicine, always ask your healthcare provider if NSAIDs are safe for you  Always read the medicine label and follow directions  · Nasal steroid sprays  may help decrease inflammation in your nose and sinuses  · Decongestants  help reduce swelling and drain mucus in the nose and sinuses  They may help you breathe easier  · Antihistamines  help dry mucus in the nose and relieve sneezing  · Antibiotics  help treat or prevent a bacterial infection  · Take your medicine as directed  Contact your healthcare provider if you think your medicine is not helping or if you have side effects  Tell him or her if you are allergic to any medicine  Keep a list of the medicines, vitamins, and herbs you take  Include the amounts, and when and why you take them  Bring the list or the pill bottles to follow-up visits  Carry your medicine list with you in case of an emergency  Self-care:   · Rinse your sinuses  Use a sinus rinse device to rinse your nasal passages with a saline (salt water) solution or distilled water  Do not use tap water  This will help thin the mucus in your nose and rinse away pollen and dirt  It will also help reduce swelling so you can breathe normally  Ask your healthcare provider how often to do this  · Breathe in steam   Heat a bowl of water until you see steam  Lean over the bowl and make a tent over your head with a large towel  Breathe deeply for about 20 minutes   Be careful not to get too close to the steam or burn yourself  Do this 3 times a day  You can also breathe deeply when you take a hot shower  · Sleep with your head elevated  Place an extra pillow under your head before you go to sleep to help your sinuses drain  · Drink liquids as directed  Ask your healthcare provider how much liquid to drink each day and which liquids are best for you  Liquids will thin the mucus in your nose and help it drain  Avoid drinks that contain alcohol or caffeine  · Do not smoke, and avoid secondhand smoke  Nicotine and other chemicals in cigarettes and cigars can make your symptoms worse  Ask your healthcare provider for information if you currently smoke and need help to quit  E-cigarettes or smokeless tobacco still contain nicotine  Talk to your healthcare provider before you use these products  Prevent the spread of germs that cause sinusitis:  Wash your hands often with soap and water  Wash your hands after you use the bathroom, change a child's diaper, or sneeze  Wash your hands before you prepare or eat food  Follow up with your healthcare provider as directed: You may be referred to an ear, nose, and throat specialist  Write down your questions so you remember to ask them during your visits  © 2017 [address]  Information is for End User's use only and may not be sold, redistributed or otherwise used for commercial purposes  All illustrations and images included in CareNotes® are the copyrighted property of [employer]  or [employer]  The above information is an  only  It is not intended as medical advice for individual conditions or treatments  Talk to your doctor, nurse or pharmacist before following any medical regimen to see if it is safe and effective for you  Dental Abscess   AMBULATORY CARE:   A dental abscess  is a collection of pus in or around a tooth  A dental abscess is caused by bacteria   The bacteria usually enter the tooth when the enamel (outer part of the tooth) is damaged by tooth decay  Bacteria may also enter the tooth through a break or chip in the tooth, or a cut in the gum  Food particles that are stuck between the teeth for a long time may also lead to an abscess  Signs and symptoms of a dental abscess:   · Toothache, a loose tooth, or a tooth that is very sensitive to pressure or temperature    · Bad breath, unpleasant taste, and drooling    · Fever    · Pain, redness, and swelling of the gums, or swelling of your face and neck    · Pain when you open or close your mouth    · Trouble opening your mouth  Seek care immediately if:   · You have severe pain  · You have trouble breathing because of pain or swelling  Contact your healthcare provider if:   · Your symptoms get worse, even after treatment  · Your mouth is bleeding  · You cannot eat or drink because of pain or swelling  · Your abscess returns  · You have an injury that causes a crack in your tooth  · You have questions or concerns about your condition or care  Treatment:  You may  need any of the following:  · Medicines  may be given to treat a bacterial infection and decrease pain  · Incision and drainage  is a cut in the abscess to allow the pus to drain  A sample of fluid may be collected from your abscess  The fluid is sent to a lab and tested for bacteria  Ask your healthcare provider for more information  · A root canal  is a procedure to remove the bacteria and prevent more infection  It is usually done after an incision and drainage  A filling or crown will be placed over the tooth after you have healed from your root canal      · Tooth removal  may be needed if the infection affects deeper tissues  This is usually done after an incision and drainage  Self-care:   · Rinse your mouth every 2 hours with salt water  This will help keep the area clean  · Gently brush your teeth twice a day with a soft tooth brush    This will help keep the area clean  · Eat soft foods as directed  Soft foods may cause less pain  Examples include applesauce, yogurt, and cooked pasta  Ask your healthcare provider how long to follow this instruction  · Apply a warm compress to your tooth or gum  Use a cotton ball or gauze soaked in warm water  Remove the compress in 10 minutes or when it becomes cool  Repeat 3 times a day  Prevent another abscess:   · Brush your teeth at least 2 times a day with fluoride toothpaste  · Use dental floss to clean between your teeth at least once a day  · Rinse your mouth with water or mouthwash after meals and snacks  · Chew sugarless gum after meals and snacks  · Limit foods that are sticky and high in sugar such as raisons  Also limit drinks high in sugar, such as soda  · See your dentist every 6 months for dental cleanings and oral exams  Follow up with your healthcare provider in 24 hours: Your healthcare provider will need to check your teeth and gums  Write down your questions so you remember to ask them during your visits  © 2017 [address] Information is for End User's use only and may not be sold, redistributed or otherwise used for commercial purposes  All illustrations and images included in CareNotes® are the copyrighted property of [employer]  or [employer]  The above information is an  only  It is not intended as medical advice for individual conditions or treatments  Talk to your doctor, nurse or pharmacist before following any medical regimen to see if it is safe and effective for you

## 2019-01-14 NOTE — PROGRESS NOTES
[address] [facility]        NAME: Brianna Rojo is a 26 y o  female  : 1985    MRN: [id number]  DATE: 2019  TIME: 3:14 PM    Assessment and Plan   Acute non-recurrent maxillary sinusitis [J01 00]  1  Acute non-recurrent maxillary sinusitis  amoxicillin-clavulanate (AUGMENTIN) 875-125 mg per tablet   2  Dental caries           Patient Instructions     Plain Mucinex for congestion  Call [phone] link to get established with the dental clinic in [place]  Follow up with PCP in 3-5 days  Proceed to  ER if symptoms worsen  Chief Complaint     Chief Complaint   Patient presents with    Swollen Glands     Pt c/o swollen glands and pressure on the left side of her face for two weeks  History of Present Illness       Swollen Glands (Pt c/o swollen glands and pressure on the left side of her face for two weeks  )  Patient was evaluated in the emergency room about 10 days ago for chest pain/stroke, which was ruled out  She had this right sided facial neck swelling and fullness for which they prescribed prednisone  Prednisone is not resolve the problem, patient still complains of dental pain both upper and lower molars on the left as well as sinus pain and pressure on the left        Review of Systems   Review of Systems   Constitutional: Negative for activity change, appetite change, chills and fever  HENT: Positive for congestion, dental problem, postnasal drip, rhinorrhea and sinus pressure  Respiratory: Negative  Cardiovascular: Negative            Current Medications       Current Outpatient Prescriptions:     amLODIPine (NORVASC) 10 mg tablet, Take 1 tablet (10 mg total) by mouth daily (Patient not taking: Reported on 2019 ), Disp: 30 tablet, Rfl: 3    amLODIPine (NORVASC) 10 mg tablet, Take 1 tablet (10 mg total) by mouth daily, Disp: 30 tablet, Rfl: 1    amoxicillin-clavulanate (AUGMENTIN) 875-125 mg per tablet, Take 1 tablet by mouth every 12 (twelve) hours for 10 days, Disp: 20 tablet, Rfl: 0    atorvastatin (LIPITOR) 10 mg tablet, Take 1 tablet (10 mg total) by mouth daily (Patient not taking: Reported on 1/2/2019 ), Disp: 30 tablet, Rfl: 2    fluticasone (FLONASE) 50 mcg/act nasal spray, 1 spray into each nostril daily as needed for rhinitis Each nostril, Disp: , Rfl:     Fluticasone Furoate-Vilanterol (BREO ELLIPTA IN), Inhale 100 mcg daily 1 puff at same time each day, Disp: , Rfl:     levothyroxine 200 mcg tablet, Take 1 tablet (200 mcg total) by mouth daily (Patient not taking: Reported on 1/2/2019 ), Disp: 30 tablet, Rfl: 2    levothyroxine 75 mcg tablet, Take 1 tablet (75 mcg total) by mouth daily, Disp: 30 tablet, Rfl: 1    lisinopril (ZESTRIL) 10 mg tablet, Take 10 mg by mouth daily, Disp: , Rfl:     metFORMIN (GLUCOPHAGE) 500 mg tablet, Take 500 mg by mouth daily, Disp: , Rfl:     Methylprednisolone 4 MG TBPK, Use as directed on package (Patient not taking: Reported on 1/14/2019 ), Disp: 21 tablet, Rfl: 0    montelukast (SINGULAIR) 10 mg tablet, Take 1 tablet (10 mg total) by mouth daily at bedtime (Patient not taking: Reported on 1/2/2019 ), Disp: 30 tablet, Rfl: 2    naproxen (NAPROSYN) 500 mg tablet, Take 500 mg by mouth daily as needed for mild pain, Disp: , Rfl:     pantoprazole (PROTONIX) 40 mg tablet, Take 1 tablet (40 mg total) by mouth daily (Patient not taking: Reported on 1/2/2019 ), Disp: 30 tablet, Rfl: 2    PARoxetine (PAXIL) 40 MG tablet, Take 40 mg by mouth daily, Disp: , Rfl:     Current Allergies     Allergies as of 01/14/2019 - Reviewed 01/14/2019   Allergen Reaction Noted    Other  08/15/2018    Vancomycin  08/15/2018            The following portions of the patient's history were reviewed and updated as appropriate: allergies, current medications, past family history, past medical history, past social history, past surgical history and problem list      Past Medical History:   Diagnosis Date    Acute sinus infection     Anxiety     Asthma     Depression     Disease of thyroid gland     GERD (gastroesophageal reflux disease)     Heel spur     right    Hypertension     MRSA (methicillin resistant Staphylococcus aureus)     right leg & right leg inner thigh    Neck pain     Obesity     Sleep disturbance        Past Surgical History:   Procedure Laterality Date     SECTION      x1 live birth   Haprer Glenn THYROIDECTOMY  1       Family History   Problem Relation Age of Onset    Cervical cancer Mother     Hypertension Mother     Hypertension Father          Medications have been verified  Objective   /77   Pulse 87   Temp 98 1 °F (36 7 °C)   Resp 18   SpO2 99%        Physical Exam     Physical Exam   Constitutional: She is oriented to person, place, and time  She appears well-developed and well-nourished  HENT:   Right Ear: External ear normal    Left Ear: External ear normal    Nose: Left sinus exhibits maxillary sinus tenderness  Mouth/Throat: Oropharynx is clear and moist  No oropharyngeal exudate  Eyes: Conjunctivae are normal    Neck: Normal range of motion  Neck supple  Cardiovascular: Normal rate, regular rhythm and normal heart sounds  No murmur heard  Pulmonary/Chest: Effort normal and breath sounds normal  No respiratory distress  She has no wheezes  She has no rales  She exhibits no tenderness  Abdominal: Soft  Musculoskeletal: Normal range of motion  Lymphadenopathy:     She has no cervical adenopathy  Neurological: She is alert and oriented to person, place, and time  Skin: Skin is warm  No rash noted  No erythema

## 2019-03-26 ENCOUNTER — OFFICE VISIT (OUTPATIENT)
Dept: URGENT CARE | Facility: CLINIC | Age: 34
End: 2019-03-26
Payer: COMMERCIAL

## 2019-03-26 VITALS
RESPIRATION RATE: 18 BRPM | TEMPERATURE: 96.8 F | HEIGHT: 66 IN | HEART RATE: 80 BPM | DIASTOLIC BLOOD PRESSURE: 84 MMHG | SYSTOLIC BLOOD PRESSURE: 122 MMHG | BODY MASS INDEX: 41.78 KG/M2 | OXYGEN SATURATION: 98 % | WEIGHT: 260 LBS

## 2019-03-26 DIAGNOSIS — J01.40 ACUTE NON-RECURRENT PANSINUSITIS: Primary | ICD-10-CM

## 2019-03-26 PROCEDURE — 99213 OFFICE O/P EST LOW 20 MIN: CPT | Performed by: NURSE PRACTITIONER

## 2019-03-26 PROCEDURE — S9088 SERVICES PROVIDED IN URGENT: HCPCS | Performed by: NURSE PRACTITIONER

## 2019-03-26 NOTE — PROGRESS NOTES
[facility]        NAME: Kristie Ramos is a 37 y o  female  : 1985    MRN: [id number]  DATE: 2019  TIME: 11:02 AM    Assessment and Plan   Acute non-recurrent pansinusitis [J01 40]  1  Acute non-recurrent pansinusitis           Patient Instructions   Take the Augmentin as ordered until completed  You may continue to use over-the-counter medications to treat symptoms  Follow up with PCP in 3-5 days  Proceed to  ER if symptoms worsen  Chief Complaint     Chief Complaint   Patient presents with    Sinusitis     Pt c/o sinus pain, sore throat, teresa ear pain, and head congestion x 1 week  History of Present Illness       Patient reports symptoms x7-8 days including congestion, sinus pain and pressure, postnasal drip, occasional productive cough, ear pain and pressure, low-grade fever, chills, headache  She reports that she feels like her symptoms are getting worse and not better--she states that her ear pain and pressure has been increasing and that she now occasionally gets dizzy  She reports that the glands on her neck feels swollen  Review of Systems   Review of Systems   Constitutional: Positive for chills, fatigue and fever  Negative for activity change and appetite change  HENT: Positive for congestion, postnasal drip, sinus pressure, sinus pain and sore throat  Eyes: Negative for discharge  Respiratory: Positive for cough  Negative for shortness of breath  Gastrointestinal: Negative for abdominal pain, constipation, diarrhea, nausea and vomiting  Genitourinary: Negative for decreased urine volume  Musculoskeletal: Negative for arthralgias and myalgias  Skin: Negative for color change  Neurological: Positive for dizziness and headaches  Negative for syncope, weakness and light-headedness  Hematological: Positive for adenopathy  All other systems reviewed and are negative          Current Medications       Current Outpatient Medications:    amLODIPine (NORVASC) 10 mg tablet, Take 1 tablet (10 mg total) by mouth daily (Patient not taking: Reported on 1/2/2019 ), Disp: 30 tablet, Rfl: 3    amLODIPine (NORVASC) 10 mg tablet, Take 1 tablet (10 mg total) by mouth daily (Patient not taking: Reported on 3/26/2019), Disp: 30 tablet, Rfl: 1    atorvastatin (LIPITOR) 10 mg tablet, Take 1 tablet (10 mg total) by mouth daily (Patient not taking: Reported on 1/2/2019 ), Disp: 30 tablet, Rfl: 2    fluticasone (FLONASE) 50 mcg/act nasal spray, 1 spray into each nostril daily as needed for rhinitis Each nostril, Disp: , Rfl:     Fluticasone Furoate-Vilanterol (BREO ELLIPTA IN), Inhale 100 mcg daily 1 puff at same time each day, Disp: , Rfl:     levothyroxine 200 mcg tablet, Take 1 tablet (200 mcg total) by mouth daily (Patient not taking: Reported on 1/2/2019 ), Disp: 30 tablet, Rfl: 2    levothyroxine 75 mcg tablet, Take 1 tablet (75 mcg total) by mouth daily (Patient not taking: Reported on 3/26/2019), Disp: 30 tablet, Rfl: 1    lisinopril (ZESTRIL) 10 mg tablet, Take 10 mg by mouth daily, Disp: , Rfl:     metFORMIN (GLUCOPHAGE) 500 mg tablet, Take 500 mg by mouth daily, Disp: , Rfl:     Methylprednisolone 4 MG TBPK, Use as directed on package (Patient not taking: Reported on 1/14/2019 ), Disp: 21 tablet, Rfl: 0    montelukast (SINGULAIR) 10 mg tablet, Take 1 tablet (10 mg total) by mouth daily at bedtime (Patient not taking: Reported on 1/2/2019 ), Disp: 30 tablet, Rfl: 2    naproxen (NAPROSYN) 500 mg tablet, Take 500 mg by mouth daily as needed for mild pain, Disp: , Rfl:     pantoprazole (PROTONIX) 40 mg tablet, Take 1 tablet (40 mg total) by mouth daily (Patient not taking: Reported on 1/2/2019 ), Disp: 30 tablet, Rfl: 2    PARoxetine (PAXIL) 40 MG tablet, Take 40 mg by mouth daily, Disp: , Rfl:     Current Allergies     Allergies as of 03/26/2019 - Reviewed 03/26/2019   Allergen Reaction Noted    Other  08/15/2018    Vancomycin  08/15/2018 The following portions of the patient's history were reviewed and updated as appropriate: allergies, current medications, past family history, past medical history, past social history, past surgical history and problem list      Past Medical History:   Diagnosis Date    Acute sinus infection     Anxiety     Asthma     Depression     Disease of thyroid gland     GERD (gastroesophageal reflux disease)     Heel spur     right    Hypertension     MRSA (methicillin resistant Staphylococcus aureus)     right leg & right leg inner thigh    Neck pain     Obesity     Sleep disturbance        Past Surgical History:   Procedure Laterality Date     SECTION      x1 live birth   [de-identified] THYROIDECTOMY  1       Family History   Problem Relation Age of Onset    Cervical cancer Mother     Hypertension Mother     Hypertension Father          Medications have been verified  Objective   /84   Pulse 80   Temp (!) 96 8 °F (36 °C)   Resp 18   Ht 5' 6" (1 676 m)   Wt 118 kg (260 lb)   SpO2 98%   BMI 41 97 kg/m²        Physical Exam     Physical Exam   Constitutional: She is oriented to person, place, and time  She appears well-developed and well-nourished  No distress  HENT:   Head: Normocephalic and atraumatic  Right Ear: Hearing, external ear and ear canal normal  There is tenderness  No swelling  Tympanic membrane is bulging  Tympanic membrane is not injected and not erythematous  No middle ear effusion  No hemotympanum  No decreased hearing is noted  Left Ear: Hearing, external ear and ear canal normal  There is tenderness  No swelling  Tympanic membrane is bulging  Tympanic membrane is not injected and not erythematous  A middle ear effusion is present  No hemotympanum  No decreased hearing is noted  Nose: Mucosal edema, rhinorrhea and sinus tenderness present  Right sinus exhibits maxillary sinus tenderness and frontal sinus tenderness   Left sinus exhibits maxillary sinus tenderness  Left sinus exhibits no frontal sinus tenderness  Mouth/Throat: Uvula is midline and mucous membranes are normal  Posterior oropharyngeal erythema present  No posterior oropharyngeal edema or tonsillar abscesses  Tonsils are 0 on the right  Tonsils are 0 on the left  No tonsillar exudate  Eyes: Pupils are equal, round, and reactive to light  Conjunctivae are normal  Right eye exhibits no discharge  Left eye exhibits no discharge  Neck: Normal range of motion  Neck supple  No thyromegaly present  Cardiovascular: Normal rate, regular rhythm and normal heart sounds  Pulmonary/Chest: Effort normal and breath sounds normal  No accessory muscle usage or stridor  No apnea, no tachypnea and no bradypnea  No respiratory distress  She has no decreased breath sounds  She has no wheezes  She has no rhonchi  She has no rales  Abdominal: Soft  Bowel sounds are normal  She exhibits no distension  There is no tenderness  Musculoskeletal: Normal range of motion  Lymphadenopathy:     She has cervical adenopathy  Neurological: She is alert and oriented to person, place, and time  Skin: Skin is warm and dry  Capillary refill takes less than 2 seconds  She is not diaphoretic  Psychiatric: She has a normal mood and affect  Her behavior is normal  Judgment and thought content normal    Nursing note and vitals reviewed

## 2019-03-26 NOTE — PATIENT INSTRUCTIONS
Take the Augmentin as ordered until completed  You may continue to use over-the-counter medications to treat symptoms  Sinusitis   AMBULATORY CARE:   Sinusitis  is inflammation or infection of your sinuses  It is most often caused by a virus  Acute sinusitis may last up to 12 weeks  Chronic sinusitis lasts longer than 12 weeks  Recurrent sinusitis means you have 4 or more times in 1 year  Common symptoms include the following:   · Fever    · Pain, pressure, redness, or swelling around the forehead, cheeks, or eyes    · Thick yellow or green discharge from your nose    · Tenderness when you touch your face over your sinuses    · Dry cough that happens mostly at night or when you lie down    · Headache and face pain that is worse when you lean forward    · Tooth pain, or pain when you chew  Seek care immediately if:   · Your eye and eyelid are red, swollen, and painful  · You cannot open your eye  · You have vision changes, such as double vision  · Your eyeball bulges out or you cannot move your eye  · You are more sleepy than normal, or you notice changes in your ability to think, move, or talk  · You have a stiff neck, a fever, or a bad headache  · You have swelling of your forehead or scalp  Contact your healthcare provider if:   · Your symptoms do not improve after 3 days  · Your symptoms do not go away after 10 days  · You have nausea and are vomiting  · Your nose is bleeding  · You have questions or concerns about your condition or care  Treatment for sinusitis:  Your symptoms may go away on their own  Your healthcare provider may recommend watchful waiting for up to 10 days before starting antibiotics  You may  need any of the following:  · Acetaminophen  decreases pain and fever  It is available without a doctor's order  Ask how much to take and how often to take it  Follow directions   Read the labels of all other medicines you are using to see if they also contain acetaminophen, or ask your doctor or pharmacist  Acetaminophen can cause liver damage if not taken correctly  Do not use more than 4 grams (4,000 milligrams) total of acetaminophen in one day  · NSAIDs , such as ibuprofen, help decrease swelling, pain, and fever  This medicine is available with or without a doctor's order  NSAIDs can cause stomach bleeding or kidney problems in certain people  If you take blood thinner medicine, always ask your healthcare provider if NSAIDs are safe for you  Always read the medicine label and follow directions  · Nasal steroid sprays  may help decrease inflammation in your nose and sinuses  · Decongestants  help reduce swelling and drain mucus in the nose and sinuses  They may help you breathe easier  · Antihistamines  help dry mucus in the nose and relieve sneezing  · Antibiotics  help treat or prevent a bacterial infection  · Take your medicine as directed  Contact your healthcare provider if you think your medicine is not helping or if you have side effects  Tell him or her if you are allergic to any medicine  Keep a list of the medicines, vitamins, and herbs you take  Include the amounts, and when and why you take them  Bring the list or the pill bottles to follow-up visits  Carry your medicine list with you in case of an emergency  Self-care:   · Rinse your sinuses  Use a sinus rinse device to rinse your nasal passages with a saline (salt water) solution or distilled water  Do not use tap water  This will help thin the mucus in your nose and rinse away pollen and dirt  It will also help reduce swelling so you can breathe normally  Ask your healthcare provider how often to do this  · Breathe in steam   Heat a bowl of water until you see steam  Lean over the bowl and make a tent over your head with a large towel  Breathe deeply for about 20 minutes  Be careful not to get too close to the steam or burn yourself  Do this 3 times a day   You can also breathe deeply when you take a hot shower  · Sleep with your head elevated  Place an extra pillow under your head before you go to sleep to help your sinuses drain  · Drink liquids as directed  Ask your healthcare provider how much liquid to drink each day and which liquids are best for you  Liquids will thin the mucus in your nose and help it drain  Avoid drinks that contain alcohol or caffeine  · Do not smoke, and avoid secondhand smoke  Nicotine and other chemicals in cigarettes and cigars can make your symptoms worse  Ask your healthcare provider for information if you currently smoke and need help to quit  E-cigarettes or smokeless tobacco still contain nicotine  Talk to your healthcare provider before you use these products  Prevent the spread of germs that cause sinusitis:  Wash your hands often with soap and water  Wash your hands after you use the bathroom, change a child's diaper, or sneeze  Wash your hands before you prepare or eat food  Follow up with your healthcare provider as directed: You may be referred to an ear, nose, and throat specialist  Write down your questions so you remember to ask them during your visits  © 2017 [employer]  Information is for End User's use only and may not be sold, redistributed or otherwise used for commercial purposes  All illustrations and images included in CareNotes® are the copyrighted property of [employer]  or [employer]  The above information is an  only  It is not intended as medical advice for individual conditions or treatments  Talk to your doctor, nurse or pharmacist before following any medical regimen to see if it is safe and effective for you

## 2019-07-23 ENCOUNTER — OFFICE VISIT (OUTPATIENT)
Dept: FAMILY MEDICINE CLINIC | Facility: CLINIC | Age: 34
End: 2019-07-23
Payer: COMMERCIAL

## 2019-07-23 VITALS
WEIGHT: 274 LBS | BODY MASS INDEX: 44.03 KG/M2 | SYSTOLIC BLOOD PRESSURE: 144 MMHG | HEART RATE: 80 BPM | HEIGHT: 66 IN | RESPIRATION RATE: 20 BRPM | TEMPERATURE: 99.2 F | DIASTOLIC BLOOD PRESSURE: 88 MMHG | OXYGEN SATURATION: 99 %

## 2019-07-23 DIAGNOSIS — I10 HYPERTENSION, UNSPECIFIED TYPE: Primary | ICD-10-CM

## 2019-07-23 DIAGNOSIS — E55.9 VITAMIN D DEFICIENCY: ICD-10-CM

## 2019-07-23 DIAGNOSIS — E53.8 B12 DEFICIENCY: ICD-10-CM

## 2019-07-23 DIAGNOSIS — R73.01 IMPAIRED FASTING GLUCOSE: ICD-10-CM

## 2019-07-23 DIAGNOSIS — J32.9 CHRONIC SINUSITIS, UNSPECIFIED LOCATION: ICD-10-CM

## 2019-07-23 DIAGNOSIS — E66.01 CLASS 3 SEVERE OBESITY DUE TO EXCESS CALORIES WITH SERIOUS COMORBIDITY AND BODY MASS INDEX (BMI) OF 40.0 TO 44.9 IN ADULT (HCC): ICD-10-CM

## 2019-07-23 DIAGNOSIS — E78.00 HYPERCHOLESTEROLEMIA: ICD-10-CM

## 2019-07-23 DIAGNOSIS — E03.9 HYPOTHYROIDISM, UNSPECIFIED TYPE: ICD-10-CM

## 2019-07-23 DIAGNOSIS — D50.9 IRON DEFICIENCY ANEMIA, UNSPECIFIED IRON DEFICIENCY ANEMIA TYPE: ICD-10-CM

## 2019-07-23 DIAGNOSIS — K21.9 GASTROESOPHAGEAL REFLUX DISEASE WITHOUT ESOPHAGITIS: ICD-10-CM

## 2019-07-23 PROBLEM — E66.813 CLASS 3 SEVERE OBESITY DUE TO EXCESS CALORIES WITH SERIOUS COMORBIDITY AND BODY MASS INDEX (BMI) OF 40.0 TO 44.9 IN ADULT (HCC): Status: ACTIVE | Noted: 2019-07-23

## 2019-07-23 PROCEDURE — 99203 OFFICE O/P NEW LOW 30 MIN: CPT | Performed by: NURSE PRACTITIONER

## 2019-07-23 RX ORDER — IBUPROFEN 600 MG/1
600 TABLET ORAL 3 TIMES DAILY
Refills: 0 | COMMUNITY
Start: 2019-07-18 | End: 2019-07-23

## 2019-07-23 NOTE — PROGRESS NOTES
[facility]        NAME: Ana Cristina Carpenter is a 37 y o  female  : 1985    MRN: [id number]  DATE: 2019  TIME: 2:26 PM    Assessment and Plan   Hypertension, unspecified type [I10]  1  Hypertension, unspecified type  Comprehensive metabolic panel   2  Gastroesophageal reflux disease without esophagitis     3  Hypercholesterolemia  Lipid panel   4  Hypothyroidism, unspecified type  TSH, 3rd generation with Free T4 reflex   5  Impaired fasting glucose  HEMOGLOBIN A1C W/ EAG ESTIMATION   6  Vitamin D deficiency  Vitamin D 25 hydroxy   7  Iron deficiency anemia, unspecified iron deficiency anemia type  CBC and differential    Iron Panel   8  B12 deficiency  Vitamin B12   9  Chronic sinusitis, unspecified location  [address] [facility] Allergy Panel, Adult   10  Class 3 severe obesity due to excess calories with serious comorbidity and body mass index (BMI) of 40 0 to 44 9 in adult [facility])           Patient Instructions     Patient Instructions   Get labs  Return on Thursday for lab review/discuss starting medications          Chief Complaint     Chief Complaint   Patient presents with   [address]    Hypertension         History of Present Illness       Patient presents to establish care, previous Dr Leda Beltran patient  Patient recently got insurance coverage  HTN- was on medication in the past  Not for the past 2 years due to no insurance  Denies HA or chest pains  Hypothyroidism- was taking 200 mcg synthroid previously but not for the past 2 years  Last TSH in 2019 in ED was 47  Impaired Fasting Blood Sugar- was taking Metformin in the past per Dr Leda Beltran  Was instructed to lose weight  Drinks soda, and eats junk food often  Tried exercising in the past, but ended up with a wound infection of right inner thigh     GERD- was on medication in the past, not currently  Had EGD which confirmed GERD diagnosis and celiac disease per patient  No abnormal structures detected   States she has difficulty eating healthy due to GERD and celiac disease  At times will vomit after eating certain foods  Skin-patient states she went to a gym in the past, but got in infection from the weight machines  Had an infected hair follicle of left inner thigh  Needed wound vac and surgeries to leg  Sinus- states she was recently on antibiotics for sinus infection which seems to persist  Afraid to take otc sinus meds due to medication interactions or side effects  Review of Systems   Review of Systems   Constitutional: Negative for activity change, diaphoresis, fatigue and fever  HENT: Positive for congestion, facial swelling, rhinorrhea and sinus pressure  Negative for hearing loss, sinus pain, sneezing, sore throat and voice change  Eyes: Negative for discharge and visual disturbance  Respiratory: Negative for cough, choking, chest tightness, shortness of breath, wheezing and stridor  Cardiovascular: Negative for chest pain, palpitations and leg swelling  Gastrointestinal: Negative for abdominal distention, abdominal pain, constipation, diarrhea, nausea and vomiting  Endocrine: Negative for polydipsia, polyphagia and polyuria  Genitourinary: Negative for difficulty urinating, dysuria, frequency and urgency  Musculoskeletal: Negative for arthralgias, back pain, gait problem, joint swelling, myalgias, neck pain and neck stiffness  Skin: Positive for wound (history of wound vac/I&D of thigh for infected hair follicle  )  Negative for color change and rash  Neurological: Negative for dizziness, syncope, speech difficulty, weakness, light-headedness and headaches  Hematological: Negative for adenopathy  Does not bruise/bleed easily  Psychiatric/Behavioral: Negative for agitation, behavioral problems, confusion, hallucinations, sleep disturbance and suicidal ideas  The patient is nervous/anxious (history of panic attacks, does not like to take medications)            Current Medications     No current outpatient medications on file  Current Allergies     Allergies as of 2019 - Reviewed 2019   Allergen Reaction Noted    Other  08/15/2018    Vancomycin  08/15/2018            The following portions of the patient's history were reviewed and updated as appropriate: allergies, current medications, past family history, past medical history, past social history, past surgical history and problem list      Past Medical History:   Diagnosis Date    Acute sinus infection     Anxiety     Asthma     Depression     Disease of thyroid gland     GERD (gastroesophageal reflux disease)     Heel spur     right    Hypertension     MRSA (methicillin resistant Staphylococcus aureus)     right leg & right leg inner thigh    Neck pain     Obesity     Sleep disturbance        Past Surgical History:   Procedure Laterality Date     SECTION      x1 live birth   [place] THYROIDECTOMY  1       Family History   Problem Relation Age of Onset    Cervical cancer Mother     Hypertension Mother     Hypertension Father          Medications have been verified  Objective   /88   Pulse 80   Temp 99 2 °F (37 3 °C) (Tympanic)   Resp 20   Ht 5' 6" (1 676 m)   Wt 124 kg (274 lb)   SpO2 99%   BMI 44 22 kg/m²        Physical Exam     Physical Exam   Constitutional: She is oriented to person, place, and time  Vital signs are normal  She appears well-developed and well-nourished  She is active and cooperative  No distress  Eyes: EOM are normal    Neck: Normal range of motion  Neck supple  No tracheal deviation present  No thyromegaly present  Cardiovascular: Normal rate, regular rhythm and normal heart sounds  No murmur heard  Pulmonary/Chest: Effort normal and breath sounds normal  No respiratory distress  She has no wheezes  Neurological: She is alert and oriented to person, place, and time  Skin: Skin is warm and dry  No rash noted  She is not diaphoretic   No erythema  Psychiatric: She has a normal mood and affect  Her behavior is normal  Judgment and thought content normal    Nursing note and vitals reviewed  BMI Counseling: Body mass index is 44 22 kg/m²  Discussed the patient's BMI with her  The BMI is above average  BMI counseling and education was provided to the patient  Nutrition recommendations include consuming healthier snacks, decreasing soda and/or juice intake, moderation in carbohydrate intake and increasing intake of lean protein

## 2019-07-24 ENCOUNTER — APPOINTMENT (OUTPATIENT)
Dept: LAB | Facility: HOSPITAL | Age: 34
End: 2019-07-24
Payer: COMMERCIAL

## 2019-07-24 DIAGNOSIS — E53.8 B12 DEFICIENCY: ICD-10-CM

## 2019-07-24 DIAGNOSIS — R73.01 IMPAIRED FASTING GLUCOSE: ICD-10-CM

## 2019-07-24 DIAGNOSIS — E55.9 VITAMIN D DEFICIENCY: ICD-10-CM

## 2019-07-24 DIAGNOSIS — E03.9 HYPOTHYROIDISM, UNSPECIFIED TYPE: ICD-10-CM

## 2019-07-24 DIAGNOSIS — D50.9 IRON DEFICIENCY ANEMIA, UNSPECIFIED IRON DEFICIENCY ANEMIA TYPE: ICD-10-CM

## 2019-07-24 DIAGNOSIS — E78.00 HYPERCHOLESTEROLEMIA: ICD-10-CM

## 2019-07-24 DIAGNOSIS — I10 HYPERTENSION, UNSPECIFIED TYPE: ICD-10-CM

## 2019-07-24 DIAGNOSIS — J32.9 CHRONIC SINUSITIS, UNSPECIFIED LOCATION: ICD-10-CM

## 2019-07-24 LAB
25(OH)D3 SERPL-MCNC: 30.9 NG/ML (ref 30–100)
ALBUMIN SERPL BCP-MCNC: 4.3 G/DL (ref 3.5–5.7)
ALP SERPL-CCNC: 70 U/L (ref 40–150)
ALT SERPL W P-5'-P-CCNC: 9 U/L (ref 7–52)
ANION GAP SERPL CALCULATED.3IONS-SCNC: 9 MMOL/L (ref 4–13)
AST SERPL W P-5'-P-CCNC: 10 U/L (ref 13–39)
BASOPHILS # BLD AUTO: 0 THOUSANDS/ΜL (ref 0–0.1)
BASOPHILS NFR BLD AUTO: 1 % (ref 0–2)
BILIRUB SERPL-MCNC: 0.3 MG/DL (ref 0.2–1)
BUN SERPL-MCNC: 13 MG/DL (ref 7–25)
CALCIUM SERPL-MCNC: 8.9 MG/DL (ref 8.6–10.5)
CHLORIDE SERPL-SCNC: 103 MMOL/L (ref 98–107)
CHOLEST SERPL-MCNC: 194 MG/DL (ref 0–200)
CO2 SERPL-SCNC: 28 MMOL/L (ref 21–31)
CREAT SERPL-MCNC: 0.76 MG/DL (ref 0.6–1.2)
EOSINOPHIL # BLD AUTO: 0.1 THOUSAND/ΜL (ref 0–0.61)
EOSINOPHIL NFR BLD AUTO: 2 % (ref 0–5)
ERYTHROCYTE [DISTWIDTH] IN BLOOD BY AUTOMATED COUNT: 16.3 % (ref 11.5–14.5)
EST. AVERAGE GLUCOSE BLD GHB EST-MCNC: 117 MG/DL
FERRITIN SERPL-MCNC: 25 NG/ML (ref 8–388)
GFR SERPL CREATININE-BSD FRML MDRD: 101 ML/MIN/1.73SQ M
GLUCOSE P FAST SERPL-MCNC: 114 MG/DL (ref 65–99)
HBA1C MFR BLD: 5.7 % (ref 4.2–6.3)
HCT VFR BLD AUTO: 37.9 % (ref 42–47)
HDLC SERPL-MCNC: 39 MG/DL (ref 40–60)
HGB BLD-MCNC: 12.5 G/DL (ref 12–16)
IRON SATN MFR SERPL: 10 %
IRON SERPL-MCNC: 38 UG/DL (ref 50–170)
LDLC SERPL CALC-MCNC: 145 MG/DL (ref 0–100)
LYMPHOCYTES # BLD AUTO: 1.7 THOUSANDS/ΜL (ref 0.6–4.47)
LYMPHOCYTES NFR BLD AUTO: 24 % (ref 21–51)
MCH RBC QN AUTO: 26.8 PG (ref 26–34)
MCHC RBC AUTO-ENTMCNC: 33 G/DL (ref 31–37)
MCV RBC AUTO: 81 FL (ref 81–99)
MONOCYTES # BLD AUTO: 0.3 THOUSAND/ΜL (ref 0.17–1.22)
MONOCYTES NFR BLD AUTO: 5 % (ref 2–12)
NEUTROPHILS # BLD AUTO: 4.9 THOUSANDS/ΜL (ref 1.4–6.5)
NEUTS SEG NFR BLD AUTO: 69 % (ref 42–75)
NONHDLC SERPL-MCNC: 155 MG/DL
PLATELET # BLD AUTO: 325 THOUSANDS/UL (ref 149–390)
PMV BLD AUTO: 7.7 FL (ref 8.6–11.7)
POTASSIUM SERPL-SCNC: 4.1 MMOL/L (ref 3.5–5.5)
PROT SERPL-MCNC: 8.1 G/DL (ref 6.4–8.9)
RBC # BLD AUTO: 4.67 MILLION/UL (ref 3.9–5.2)
SODIUM SERPL-SCNC: 140 MMOL/L (ref 134–143)
T4 FREE SERPL-MCNC: 0.55 NG/DL (ref 0.76–1.46)
TIBC SERPL-MCNC: 384 UG/DL (ref 250–450)
TRIGL SERPL-MCNC: 50 MG/DL (ref 44–166)
TSH SERPL DL<=0.05 MIU/L-ACNC: 46.11 UIU/ML (ref 0.45–5.33)
VIT B12 SERPL-MCNC: 333 PG/ML (ref 100–900)
WBC # BLD AUTO: 7.1 THOUSAND/UL (ref 4.8–10.8)

## 2019-07-24 PROCEDURE — 86003 ALLG SPEC IGE CRUDE XTRC EA: CPT

## 2019-07-24 PROCEDURE — 85025 COMPLETE CBC W/AUTO DIFF WBC: CPT

## 2019-07-24 PROCEDURE — 84439 ASSAY OF FREE THYROXINE: CPT

## 2019-07-24 PROCEDURE — 80053 COMPREHEN METABOLIC PANEL: CPT

## 2019-07-24 PROCEDURE — 82728 ASSAY OF FERRITIN: CPT

## 2019-07-24 PROCEDURE — 82607 VITAMIN B-12: CPT

## 2019-07-24 PROCEDURE — 82306 VITAMIN D 25 HYDROXY: CPT

## 2019-07-24 PROCEDURE — 80061 LIPID PANEL: CPT

## 2019-07-24 PROCEDURE — 83540 ASSAY OF IRON: CPT

## 2019-07-24 PROCEDURE — 36415 COLL VENOUS BLD VENIPUNCTURE: CPT

## 2019-07-24 PROCEDURE — 82785 ASSAY OF IGE: CPT

## 2019-07-24 PROCEDURE — 83036 HEMOGLOBIN GLYCOSYLATED A1C: CPT

## 2019-07-24 PROCEDURE — 84443 ASSAY THYROID STIM HORMONE: CPT

## 2019-07-24 PROCEDURE — 83550 IRON BINDING TEST: CPT

## 2019-07-25 ENCOUNTER — OFFICE VISIT (OUTPATIENT)
Dept: FAMILY MEDICINE CLINIC | Facility: CLINIC | Age: 34
End: 2019-07-25
Payer: COMMERCIAL

## 2019-07-25 VITALS
RESPIRATION RATE: 20 BRPM | OXYGEN SATURATION: 99 % | HEIGHT: 66 IN | HEART RATE: 80 BPM | DIASTOLIC BLOOD PRESSURE: 90 MMHG | BODY MASS INDEX: 43.71 KG/M2 | SYSTOLIC BLOOD PRESSURE: 146 MMHG | WEIGHT: 272 LBS | TEMPERATURE: 100.3 F

## 2019-07-25 DIAGNOSIS — E66.01 CLASS 3 SEVERE OBESITY DUE TO EXCESS CALORIES WITHOUT SERIOUS COMORBIDITY WITH BODY MASS INDEX (BMI) OF 40.0 TO 44.9 IN ADULT (HCC): ICD-10-CM

## 2019-07-25 DIAGNOSIS — N63.0 BREAST LUMP: ICD-10-CM

## 2019-07-25 DIAGNOSIS — I10 HYPERTENSION, UNSPECIFIED TYPE: ICD-10-CM

## 2019-07-25 DIAGNOSIS — E03.9 HYPOTHYROIDISM, UNSPECIFIED TYPE: Primary | ICD-10-CM

## 2019-07-25 LAB
A ALTERNATA IGE QN: <0.1 KUA/I
A FUMIGATUS IGE QN: <0.1 KUA/I
ALLERGEN COMMENT: NORMAL
BERMUDA GRASS IGE QN: <0.1 KUA/I
BOXELDER IGE QN: <0.1 KUA/I
C HERBARUM IGE QN: <0.1 KUA/I
CAT DANDER IGE QN: <0.1 KUA/I
CMN PIGWEED IGE QN: <0.1 KUA/I
COMMON RAGWEED IGE QN: <0.1 KUA/I
COTTONWOOD IGE QN: <0.1 KUA/I
D FARINAE IGE QN: <0.1 KUA/I
D PTERONYSS IGE QN: <0.1 KUA/I
DOG DANDER IGE QN: <0.1 KUA/I
LONDON PLANE IGE QN: <0.1 KUA/I
MOUSE URINE PROT IGE QN: <0.1 KUA/I
MT JUNIPER IGE QN: <0.1 KUA/I
MUGWORT IGE QN: <0.1 KUA/I
P NOTATUM IGE QN: <0.1 KUA/I
ROACH IGE QN: <0.1 KUA/I
SHEEP SORREL IGE QN: <0.1 KUA/I
SILVER BIRCH IGE QN: <0.1 KUA/I
TIMOTHY IGE QN: <0.1 KUA/I
TOTAL IGE SMQN RAST: 13.6 KU/L (ref 0–113)
WALNUT IGE QN: <0.1 KUA/I
WHITE ASH IGE QN: <0.1 KUA/I
WHITE ELM IGE QN: <0.1 KUA/I
WHITE MULBERRY IGE QN: <0.1 KUA/I
WHITE OAK IGE QN: <0.1 KUA/I

## 2019-07-25 PROCEDURE — 3008F BODY MASS INDEX DOCD: CPT | Performed by: NURSE PRACTITIONER

## 2019-07-25 PROCEDURE — 99214 OFFICE O/P EST MOD 30 MIN: CPT | Performed by: NURSE PRACTITIONER

## 2019-07-25 PROCEDURE — 1036F TOBACCO NON-USER: CPT | Performed by: NURSE PRACTITIONER

## 2019-07-25 RX ORDER — LISINOPRIL 10 MG/1
10 TABLET ORAL DAILY
Qty: 90 TABLET | Refills: 1 | Status: SHIPPED | OUTPATIENT
Start: 2019-07-25 | End: 2020-01-16 | Stop reason: SDUPTHER

## 2019-07-25 RX ORDER — LEVOTHYROXINE SODIUM 0.2 MG/1
200 TABLET ORAL
Qty: 90 TABLET | Refills: 0 | Status: SHIPPED | OUTPATIENT
Start: 2019-07-25 | End: 2019-10-29

## 2019-07-25 NOTE — PATIENT INSTRUCTIONS
Start Levothyroxine 200 mcg daily, recheck TSH in 6-8 weeks  Start Lisinopril 10mg daily, return in 3 months for f/u  Follow up with Dr Ramu Sanchez for breast lumps, routine GYN visit, and mammography  Call or return sooner for problems/concerns

## 2019-07-25 NOTE — PROGRESS NOTES
[facility]        NAME: Rylie Raphael is a 37 y o  female  : 1985    MRN: [id number]  DATE: 2019  TIME: 3:27 PM    Assessment and Plan   Hypothyroidism, unspecified type [E03 9]  1  Hypothyroidism, unspecified type  levothyroxine 200 mcg tablet    TSH, 3rd generation with Free T4 reflex   2  Hypertension, unspecified type  lisinopril (ZESTRIL) 10 mg tablet   3  Breast lump  Ambulatory referral to Obstetrics / Gynecology   4  Class 3 severe obesity due to excess calories without serious comorbidity with body mass index (BMI) of 40 0 to 44 9 in adult [facility])           Patient Instructions     Patient Instructions   Start Levothyroxine 200 mcg daily, recheck TSH in 6-8 weeks  Start Lisinopril 10mg daily, return in 3 months for f/u  Follow up with Annye Sandifer, Dr Jed Code for breast lumps, routine GYN visit, and mammography  Call or return sooner for problems/concerns  Chief Complaint     Chief Complaint   Patient presents with    Labs Only         History of Present Illness       Patient here to review blood work  Hypothyroidism- TSH 46, will begin taking 200 mcg Synthroid daily  Will repeat TSH in 6-8 weeks, lap slip provided  BP-pressure elevated today  Will begin Lisinopril 10 mg daily  Patient will return in 3 months for follow up and will reevaluate medication dosage at this time  Agrees to plan  Allergies- allergy panel reviewed, no environmental allergies  Will consider food allergy panel in future if sinus issues persist      Breast lump- patient will follow up with Dr Mal Fabian- referral provided for routine GYN exam, evaluation of breast lump, and mammography  Patient states history of breast lump in which she had a mammo and needle biopsy showing fatty lipoma  No pain or discoloration to skin  Dental- is currently on antibiotics for dental abscess or left upper jaw   Is following with dentist        Review of Systems Review of Systems   Constitutional: Negative for activity change, diaphoresis, fatigue and fever  HENT: Positive for dental problem (has a tooth abcess, currently on antibiotics  ), sinus pressure and sinus pain  Negative for congestion, facial swelling, hearing loss, rhinorrhea, sneezing, sore throat and voice change  Eyes: Negative for discharge and visual disturbance  Respiratory: Negative for cough, choking, chest tightness, shortness of breath, wheezing and stridor  Cardiovascular: Negative for chest pain, palpitations and leg swelling  Gastrointestinal: Negative for abdominal distention, abdominal pain, constipation, diarrhea, nausea and vomiting  Endocrine: Negative for polydipsia, polyphagia and polyuria  Genitourinary: Negative for difficulty urinating, dysuria, frequency and urgency  Musculoskeletal: Negative for arthralgias, back pain, gait problem, joint swelling, myalgias, neck pain and neck stiffness  Skin: Negative for color change, rash and wound  Breast lumps bilaterally     Neurological: Negative for dizziness, syncope, speech difficulty, weakness, light-headedness and headaches  Hematological: Negative for adenopathy  Does not bruise/bleed easily  Psychiatric/Behavioral: Negative for agitation, behavioral problems, confusion, hallucinations, sleep disturbance and suicidal ideas  The patient is not nervous/anxious            Current Medications       Current Outpatient Medications:     levothyroxine 200 mcg tablet, Take 1 tablet (200 mcg total) by mouth daily in the early morning, Disp: 90 tablet, Rfl: 0    lisinopril (ZESTRIL) 10 mg tablet, Take 1 tablet (10 mg total) by mouth daily, Disp: 90 tablet, Rfl: 1    Current Allergies     Allergies as of 07/25/2019 - Reviewed 07/23/2019   Allergen Reaction Noted    Other  08/15/2018    Vancomycin  08/15/2018            The following portions of the patient's history were reviewed and updated as appropriate: allergies, current medications, past family history, past medical history, past social history, past surgical history and problem list      Past Medical History:   Diagnosis Date    Acute sinus infection     Anxiety     Asthma     Depression     Disease of thyroid gland     GERD (gastroesophageal reflux disease)     Heel spur     right    Hypertension     MRSA (methicillin resistant Staphylococcus aureus)     right leg & right leg inner thigh    Neck pain     Obesity     Sleep disturbance        Past Surgical History:   Procedure Laterality Date     SECTION      x1 live birth   [facility] TH[facility]ROIDECTOMY  1       Family History   Problem Relation Age of Onset    Cervical cancer Mother     Hypertension Mother     Hypertension Father          Medications have been verified  Objective   /90   Pulse 80   Temp 100 3 °F (37 9 °C) (Tympanic)   Resp 20   Ht 5' 6" (1 676 m)   Wt 123 kg (272 lb)   SpO2 99%   BMI 43 90 kg/m²        Physical Exam     Physical Exam   Constitutional: She is oriented to person, place, and time  Vital signs are normal  She appears well-developed and well-nourished  She is active and cooperative  No distress  HENT:   Head: Normocephalic and atraumatic  Eyes: EOM are normal    Neck: Trachea normal  Neck supple  No thyroid mass and no thyromegaly present  Cardiovascular: Normal rate, regular rhythm and normal heart sounds  No murmur heard  Pulmonary/Chest: Effort normal and breath sounds normal  No respiratory distress  She has no wheezes  Neurological: She is alert and oriented to person, place, and time  Skin: Skin is warm and dry  No rash noted  She is not diaphoretic  No erythema  Psychiatric: She has a normal mood and affect  Her speech is normal and behavior is normal  Judgment and thought content normal  Cognition and memory are normal    Nursing note and vitals reviewed  BMI Counseling: Body mass index is 43 9 kg/m²   Discussed the patient's BMI with her  The BMI is above average  BMI counseling and education was provided to the patient  Exercise recommendations include moderate aerobic physical activity for 150 minutes/week and exercising 3-5 times per week

## 2019-09-16 ENCOUNTER — OFFICE VISIT (OUTPATIENT)
Dept: URGENT CARE | Facility: CLINIC | Age: 34
End: 2019-09-16
Payer: COMMERCIAL

## 2019-09-16 VITALS
TEMPERATURE: 97.8 F | BODY MASS INDEX: 42.06 KG/M2 | DIASTOLIC BLOOD PRESSURE: 82 MMHG | WEIGHT: 268 LBS | OXYGEN SATURATION: 99 % | HEART RATE: 79 BPM | HEIGHT: 67 IN | SYSTOLIC BLOOD PRESSURE: 141 MMHG | RESPIRATION RATE: 18 BRPM

## 2019-09-16 DIAGNOSIS — J06.9 VIRAL UPPER RESPIRATORY TRACT INFECTION: Primary | ICD-10-CM

## 2019-09-16 PROCEDURE — 99214 OFFICE O/P EST MOD 30 MIN: CPT | Performed by: NURSE PRACTITIONER

## 2019-09-16 PROCEDURE — S9088 SERVICES PROVIDED IN URGENT: HCPCS | Performed by: NURSE PRACTITIONER

## 2019-09-16 RX ORDER — FLUTICASONE PROPIONATE 50 MCG
2 SPRAY, SUSPENSION (ML) NASAL DAILY
Qty: 1 BOTTLE | Refills: 1 | Status: SHIPPED | OUTPATIENT
Start: 2019-09-16 | End: 2020-02-12

## 2019-09-16 NOTE — PATIENT INSTRUCTIONS
Viruses are worst in the first 3-5 days, mostly better by days 7-10, and all the way better by days 7-14  It is ok for 1-2 days to take flonase 2x/day to help the sinuses drain, then resume daily dosing  You may also use over the counter medications to treat symptoms  If you are not feeling an improvement by around the 1 week bam, or if your symptoms significantly change, you should be seen again  Upper Respiratory Infection   AMBULATORY CARE:   An upper respiratory infection  is also called a common cold  It can affect your nose, throat, ears, and sinuses  Common signs and symptoms include the following:  Cold symptoms are usually worst for the first 3 to 5 days  You may have any of the following:  · Runny or stuffy nose    · Sneezing and coughing    · Sore throat or hoarseness    · Red, watery, and sore eyes    · Fatigue     · Chills and fever    · Headache, body aches, or sore muscles  Seek care immediately if:   · You have chest pain or trouble breathing  Contact your healthcare provider if:   · You have a fever over 102ºF (39°C)  · Your sore throat gets worse or you see white or yellow spots in your throat  · Your symptoms get worse after 3 to 5 days or your cold is not better in 14 days  · You have a rash anywhere on your skin  · You have large, tender lumps in your neck  · You have thick, green or yellow drainage from your nose  · You cough up thick yellow, green, or bloody mucus  · You have vomiting for more than 24 hours and cannot keep fluids down  · You have a bad earache  · You have questions or concerns about your condition or care  Treatment for a cold: There is no cure for the common cold  Colds are caused by viruses and do not get better with antibiotics  Most people get better in 7 to 14 days  You may continue to cough for 2 to 3 weeks   The following may help decrease your symptoms:  · Decongestants  help reduce nasal congestion and help you breathe more easily  If you take decongestant pills, they may make you feel restless or not able to sleep  Do not use decongestant sprays for more than a few days  · Cough suppressants  help reduce coughing  Ask your healthcare provider which type of cough medicine is best for you  · NSAIDs , such as ibuprofen, help decrease swelling, pain, and fever  NSAIDs can cause stomach bleeding or kidney problems in certain people  If you take blood thinner medicine, always ask your healthcare provider if NSAIDs are safe for you  Always read the medicine label and follow directions  · Acetaminophen  decreases pain and fever  It is available without a doctor's order  Ask how much to take and how often to take it  Follow directions  Read the labels of all other medicines you are using to see if they also contain acetaminophen, or ask your doctor or pharmacist  Acetaminophen can cause liver damage if not taken correctly  Do not use more than 4 grams (4,000 milligrams) total of acetaminophen in one day  Manage your cold:   · Rest as much as possible  Slowly start to do more each day  · Drink more liquids as directed  Liquids will help thin and loosen mucus so you can cough it up  Liquids will also help prevent dehydration  Liquids that help prevent dehydration include water, fruit juice, and broth  Do not drink liquids that contain caffeine  Caffeine can increase your risk for dehydration  Ask your healthcare provider how much liquid to drink each day  · Soothe a sore throat  Gargle with warm salt water  This helps your sore throat feel better  Make salt water by dissolving ¼ teaspoon salt in 1 cup warm water  You may also suck on hard candy or throat lozenges  You may use a sore throat spray  · Use a humidifier or vaporizer  Use a cool mist humidifier or a vaporizer to increase air moisture in your home  This may make it easier for you to breathe and help decrease your cough       · Use saline nasal drops as directed  These help relieve congestion  · Apply petroleum-based jelly around the outside of your nostrils  This can decrease irritation from blowing your nose  · Do not smoke  Nicotine and other chemicals in cigarettes and cigars can make your symptoms worse  They can also cause infections such as bronchitis or pneumonia  Ask your healthcare provider for information if you currently smoke and need help to quit  E-cigarettes or smokeless tobacco still contain nicotine  Talk to your healthcare provider before you use these products  Prevent spreading your cold to others:   · Try to stay away from other people during the first 2 to 3 days of your cold when it is more easily spread  · Do not share food or drinks  · Do not share hand towels with household members  · Wash your hands often, especially after you blow your nose  Turn away from other people and cover your mouth and nose with a tissue when you sneeze or cough  Follow up with your healthcare provider as directed:  Write down your questions so you remember to ask them during your visits  © 2017 [employer]  Information is for End User's use only and may not be sold, redistributed or otherwise used for commercial purposes  All illustrations and images included in CareNotes® are the copyrighted property of [employer]  or [employer]  The above information is an  only  It is not intended as medical advice for individual conditions or treatments  Talk to your doctor, nurse or pharmacist before following any medical regimen to see if it is safe and effective for you

## 2019-09-16 NOTE — PROGRESS NOTES
[facility]        NAME: Trish Grossman is a 37 y o  female  : 1985    MRN: [id number]  DATE: 2019  TIME: 9:35 AM    Assessment and Plan   Viral upper respiratory tract infection [J06 9]  1  Viral upper respiratory tract infection  fluticasone (FLONASE) 50 mcg/act nasal spray         Patient Instructions     Patient Instructions     Viruses are worst in the first 3-5 days, mostly better by days 7-10, and all the way better by days 7-14  It is ok for 1-2 days to take flonase 2x/day to help the sinuses drain, then resume daily dosing  You may also use over the counter medications to treat symptoms  If you are not feeling an improvement by around the 1 week bam, or if your symptoms significantly change, you should be seen again  Upper Respiratory Infection   AMBULATORY CARE:   An upper respiratory infection  is also called a common cold  It can affect your nose, throat, ears, and sinuses  Common signs and symptoms include the following:  Cold symptoms are usually worst for the first 3 to 5 days  You may have any of the following:  · Runny or stuffy nose    · Sneezing and coughing    · Sore throat or hoarseness    · Red, watery, and sore eyes    · Fatigue     · Chills and fever    · Headache, body aches, or sore muscles  Seek care immediately if:   · You have chest pain or trouble breathing  Contact your healthcare provider if:   · You have a fever over 102ºF (39°C)  · Your sore throat gets worse or you see white or yellow spots in your throat  · Your symptoms get worse after 3 to 5 days or your cold is not better in 14 days  · You have a rash anywhere on your skin  · You have large, tender lumps in your neck  · You have thick, green or yellow drainage from your nose  · You cough up thick yellow, green, or bloody mucus  · You have vomiting for more than 24 hours and cannot keep fluids down  · You have a bad earache      · You have questions or concerns about your condition or care  Treatment for a cold: There is no cure for the common cold  Colds are caused by viruses and do not get better with antibiotics  Most people get better in 7 to 14 days  You may continue to cough for 2 to 3 weeks  The following may help decrease your symptoms:  · Decongestants  help reduce nasal congestion and help you breathe more easily  If you take decongestant pills, they may make you feel restless or not able to sleep  Do not use decongestant sprays for more than a few days  · Cough suppressants  help reduce coughing  Ask your healthcare provider which type of cough medicine is best for you  · NSAIDs , such as ibuprofen, help decrease swelling, pain, and fever  NSAIDs can cause stomach bleeding or kidney problems in certain people  If you take blood thinner medicine, always ask your healthcare provider if NSAIDs are safe for you  Always read the medicine label and follow directions  · Acetaminophen  decreases pain and fever  It is available without a doctor's order  Ask how much to take and how often to take it  Follow directions  Read the labels of all other medicines you are using to see if they also contain acetaminophen, or ask your doctor or pharmacist  Acetaminophen can cause liver damage if not taken correctly  Do not use more than 4 grams (4,000 milligrams) total of acetaminophen in one day  Manage your cold:   · Rest as much as possible  Slowly start to do more each day  · Drink more liquids as directed  Liquids will help thin and loosen mucus so you can cough it up  Liquids will also help prevent dehydration  Liquids that help prevent dehydration include water, fruit juice, and broth  Do not drink liquids that contain caffeine  Caffeine can increase your risk for dehydration  Ask your healthcare provider how much liquid to drink each day  · Soothe a sore throat  Gargle with warm salt water  This helps your sore throat feel better   Make salt water by dissolving ¼ teaspoon salt in 1 cup warm water  You may also suck on hard candy or throat lozenges  You may use a sore throat spray  · Use a humidifier or vaporizer  Use a cool mist humidifier or a vaporizer to increase air moisture in your home  This may make it easier for you to breathe and help decrease your cough  · Use saline nasal drops as directed  These help relieve congestion  · Apply petroleum-based jelly around the outside of your nostrils  This can decrease irritation from blowing your nose  · Do not smoke  Nicotine and other chemicals in cigarettes and cigars can make your symptoms worse  They can also cause infections such as bronchitis or pneumonia  Ask your healthcare provider for information if you currently smoke and need help to quit  E-cigarettes or smokeless tobacco still contain nicotine  Talk to your healthcare provider before you use these products  Prevent spreading your cold to others:   · Try to stay away from other people during the first 2 to 3 days of your cold when it is more easily spread  · Do not share food or drinks  · Do not share hand towels with household members  · Wash your hands often, especially after you blow your nose  Turn away from other people and cover your mouth and nose with a tissue when you sneeze or cough  Follow up with your healthcare provider as directed:  Write down your questions so you remember to ask them during your visits  © 2017 [employer] Information is for End User's use only and may not be sold, redistributed or otherwise used for commercial purposes  All illustrations and images included in CareNotes® are the copyrighted property of [employer]  or [employer]  The above information is an  only  It is not intended as medical advice for individual conditions or treatments   Talk to your doctor, nurse or pharmacist before following any medical regimen to see if it is safe and effective for you  Follow up with PCP in 3-5 days  Proceed to  ER if symptoms worsen  Chief Complaint     Chief Complaint   Patient presents with    Earache     left side    Sore Throat     stared thursday          History of Present Illness       Patient began experiencing congestion, cough, sneezing, sore throat this past Thursday  Today she has significant left ear pain  She presents to be seen      Review of Systems   Review of Systems   HENT: Positive for congestion, ear pain, postnasal drip, rhinorrhea, sinus pressure, sinus pain, sneezing and sore throat  Negative for ear discharge  Respiratory: Positive for cough  All other systems reviewed and are negative          Current Medications       Current Outpatient Medications:     levothyroxine 200 mcg tablet, Take 1 tablet (200 mcg total) by mouth daily in the early morning, Disp: 90 tablet, Rfl: 0    lisinopril (ZESTRIL) 10 mg tablet, Take 1 tablet (10 mg total) by mouth daily, Disp: 90 tablet, Rfl: 1    fluticasone (FLONASE) 50 mcg/act nasal spray, 2 sprays into each nostril daily, Disp: 1 Bottle, Rfl: 1    Current Allergies     Allergies as of 2019 - Reviewed 2019   Allergen Reaction Noted    Other  08/15/2018    Vancomycin  08/15/2018            The following portions of the patient's history were reviewed and updated as appropriate: allergies, current medications, past family history, past medical history, past social history, past surgical history and problem list      Past Medical History:   Diagnosis Date    Acute sinus infection     Anxiety     Asthma     Depression     Disease of thyroid gland     GERD (gastroesophageal reflux disease)     Heel spur     right    Hypertension     MRSA (methicillin resistant Staphylococcus aureus)     right leg & right leg inner thigh    Neck pain     Obesity     Sleep disturbance        Past Surgical History:   Procedure Laterality Date     [facility] [place] [address]  [facility]       Family History   Problem Relation Age of Onset    Cervical cancer Mother     Hypertension Mother     Hypertension Father          Medications have been verified  Objective   /82   Pulse 79   Temp 97 8 °F (36 6 °C)   Resp 18   Ht 5' 7" (1 702 m)   Wt 122 kg (268 lb)   SpO2 99%   BMI 41 97 kg/m²        Physical Exam     Physical Exam   Constitutional: She is oriented to person, place, and time  She appears well-developed and well-nourished  She is cooperative  Non-toxic appearance  No distress  HENT:   Head: Normocephalic and atraumatic  Right Ear: Hearing, external ear and ear canal normal  No swelling or tenderness  Tympanic membrane is bulging  Tympanic membrane is not injected and not erythematous  Left Ear: Hearing, external ear and ear canal normal  There is tenderness  No swelling  Tympanic membrane is bulging  Tympanic membrane is not injected and not erythematous  Nose: Mucosal edema, rhinorrhea and sinus tenderness present  Right sinus exhibits maxillary sinus tenderness  Right sinus exhibits no frontal sinus tenderness  Left sinus exhibits maxillary sinus tenderness  Left sinus exhibits no frontal sinus tenderness  Mouth/Throat: Uvula is midline and mucous membranes are normal  Posterior oropharyngeal erythema (Mild) present  No oropharyngeal exudate, posterior oropharyngeal edema or tonsillar abscesses  Tonsils are 1+ on the right  Tonsils are 1+ on the left  No tonsillar exudate  Eyes: Pupils are equal, round, and reactive to light  Conjunctivae are normal  Right eye exhibits no discharge  Left eye exhibits no discharge  Neck: Normal range of motion  Neck supple  Cardiovascular: Normal rate, regular rhythm and normal heart sounds  Pulmonary/Chest: Effort normal and breath sounds normal  No accessory muscle usage or stridor  No apnea, no tachypnea and no bradypnea  No respiratory distress   She has no decreased breath sounds  She has no wheezes  She has no rhonchi  She has no rales  Abdominal: Soft  Bowel sounds are normal  She exhibits no distension  There is no tenderness  Musculoskeletal: Normal range of motion  Lymphadenopathy:     She has no cervical adenopathy  Neurological: She is alert and oriented to person, place, and time  Skin: Skin is warm and dry  Capillary refill takes less than 2 seconds  She is not diaphoretic  Psychiatric: She has a normal mood and affect  Her behavior is normal  Judgment and thought content normal    Nursing note and vitals reviewed

## 2019-10-28 ENCOUNTER — APPOINTMENT (OUTPATIENT)
Dept: LAB | Facility: CLINIC | Age: 34
End: 2019-10-28
Payer: COMMERCIAL

## 2019-10-28 DIAGNOSIS — E03.9 HYPOTHYROIDISM, UNSPECIFIED TYPE: ICD-10-CM

## 2019-10-28 LAB
T4 FREE SERPL-MCNC: 0.53 NG/DL (ref 0.76–1.46)
TSH SERPL DL<=0.05 MIU/L-ACNC: 51.3 UIU/ML (ref 0.36–3.74)

## 2019-10-28 PROCEDURE — 84443 ASSAY THYROID STIM HORMONE: CPT

## 2019-10-28 PROCEDURE — 84439 ASSAY OF FREE THYROXINE: CPT

## 2019-10-28 PROCEDURE — 36415 COLL VENOUS BLD VENIPUNCTURE: CPT

## 2019-10-29 ENCOUNTER — OFFICE VISIT (OUTPATIENT)
Dept: FAMILY MEDICINE CLINIC | Facility: CLINIC | Age: 34
End: 2019-10-29
Payer: COMMERCIAL

## 2019-10-29 ENCOUNTER — TELEPHONE (OUTPATIENT)
Dept: FAMILY MEDICINE CLINIC | Facility: CLINIC | Age: 34
End: 2019-10-29

## 2019-10-29 VITALS
SYSTOLIC BLOOD PRESSURE: 164 MMHG | RESPIRATION RATE: 20 BRPM | DIASTOLIC BLOOD PRESSURE: 98 MMHG | BODY MASS INDEX: 42.69 KG/M2 | HEIGHT: 67 IN | OXYGEN SATURATION: 99 % | HEART RATE: 80 BPM | TEMPERATURE: 97.8 F | WEIGHT: 272 LBS

## 2019-10-29 DIAGNOSIS — I10 ESSENTIAL HYPERTENSION: ICD-10-CM

## 2019-10-29 DIAGNOSIS — G47.33 OBSTRUCTIVE SLEEP APNEA SYNDROME: ICD-10-CM

## 2019-10-29 DIAGNOSIS — E03.9 HYPOTHYROIDISM, UNSPECIFIED TYPE: Primary | ICD-10-CM

## 2019-10-29 DIAGNOSIS — R22.1 NECK SWELLING: ICD-10-CM

## 2019-10-29 PROCEDURE — 3008F BODY MASS INDEX DOCD: CPT | Performed by: NURSE PRACTITIONER

## 2019-10-29 PROCEDURE — 99214 OFFICE O/P EST MOD 30 MIN: CPT | Performed by: NURSE PRACTITIONER

## 2019-10-29 RX ORDER — LEVOTHYROXINE SODIUM 200 MCG
200 TABLET ORAL DAILY
Qty: 90 TABLET | Refills: 1 | Status: SHIPPED | OUTPATIENT
Start: 2019-10-29 | End: 2020-05-18 | Stop reason: SDUPTHER

## 2019-10-29 NOTE — TELEPHONE ENCOUNTER
Call made to the pharmacy in regards to her Synthroid BMN prescription   Per afua at [facility] patient does not need prior auth for medication and it went through this morning completely fine

## 2019-10-29 NOTE — PROGRESS NOTES
[facility]        NAME: Bert Goldman is a 37 y o  female  : 1985    MRN: [id number]  DATE: 2019  TIME: 9:34 AM    Assessment and Plan   Hypothyroidism, unspecified type [E03 9]  1  Hypothyroidism, unspecified type  SYNTHROID 200 MCG tablet    TSH, 3rd generation with Free T4 reflex   2  Essential hypertension     3  Neck swelling  US head neck soft tissue   4  Obstructive sleep apnea syndrome  Ambulatory referral to Sleep Medicine    Ambulatory referral to Neurology         Patient Instructions     Patient Instructions   Reviewed labs- TSH is now worse at 51 on Levothyroxine generic 200mcg daily, will switch to [facility] NAME ONLY 200mcg daily, recheck labs in 6 weeks  Lisinopril increase to 20mg daily (2 tablets)  Referral given for Dr Winter Riggs for known sleep apnea (previously on CPAP- was taken away when lost insurance)  US head/neck for neck swelling/trouble swallowing  Will give GI/ENT consult if US normal to further investigate trouble swallowing  Return in 6 weeks- get repeat TSH prior to appointment  Return sooner if needed for problems/concerns          Chief Complaint     Chief Complaint   Patient presents with    GERD    Hypothyroidism         History of Present Illness       Here for lab review  C/o neck swelling and trouble swallowing- had total thyroidectomy  Known sleep apnea- snores- had a CPAP- was taken away when she lost insurance  Has been taking her Levothyroxine 200mcg daily- TSH has increased from 46 to 51  Has had panic attacks for the past week- life stressors are worse- has been tried on medication in the past for anxiety "but then I got anxious about taking the medication"      Review of Systems   Review of Systems   Constitutional: Negative for activity change, diaphoresis, fatigue and fever  HENT: Positive for trouble swallowing   Negative for congestion, facial swelling, hearing loss, rhinorrhea, sinus pressure, sinus pain, sneezing, sore throat and voice change  Eyes: Negative for discharge and visual disturbance  Respiratory: Negative for cough, choking, chest tightness, shortness of breath, wheezing and stridor  Cardiovascular: Negative for chest pain, palpitations and leg swelling  Gastrointestinal: Negative for abdominal distention, abdominal pain, constipation, diarrhea, nausea and vomiting  Endocrine: Negative for polydipsia, polyphagia and polyuria  Genitourinary: Negative for difficulty urinating, dysuria, frequency and urgency  Musculoskeletal: Negative for arthralgias, back pain, gait problem, joint swelling, myalgias, neck pain and neck stiffness  Skin: Negative for color change, rash and wound  Neurological: Negative for dizziness, syncope, speech difficulty, weakness, light-headedness and headaches  Hematological: Negative for adenopathy  Does not bruise/bleed easily  Psychiatric/Behavioral: Negative for agitation, behavioral problems, confusion, hallucinations, sleep disturbance and suicidal ideas  The patient is nervous/anxious            Current Medications       Current Outpatient Medications:     fluticasone (FLONASE) 50 mcg/act nasal spray, 2 sprays into each nostril daily, Disp: 1 Bottle, Rfl: 1    lisinopril (ZESTRIL) 10 mg tablet, Take 1 tablet (10 mg total) by mouth daily, Disp: 90 tablet, Rfl: 1    SYNTHROID 200 MCG tablet, Take 1 tablet (200 mcg total) by mouth daily BRAND NAME ONLY- TSH 51, worse since on generic Levothyroxine, Disp: 90 tablet, Rfl: 1    Current Allergies     Allergies as of 10/29/2019 - Reviewed 10/29/2019   Allergen Reaction Noted    Other  08/15/2018    Vancomycin  08/15/2018            The following portions of the patient's history were reviewed and updated as appropriate: allergies, current medications, past family history, past medical history, past social history, past surgical history and problem list      Past Medical History:   Diagnosis Date    Acute sinus infection     Anxiety     Asthma     Depression     Disease of thyroid gland     GERD (gastroesophageal reflux disease)     Heel spur     right    Hypertension     MRSA (methicillin resistant Staphylococcus aureus)     right leg & right leg inner thigh    Neck pain     Obesity     Sleep disturbance        Past Surgical History:   Procedure Laterality Date     SECTION      x1 live birth   [place]  SECTION      THYROIDECTOMY  1       Family History   Problem Relation Age of Onset    Cervical cancer Mother     Hypertension Mother     Hypertension Father          Medications have been verified  Objective   /98   Pulse 80   Temp 97 8 °F (36 6 °C) (Tympanic)   Resp 20   Ht 5' 7" (1 702 m)   Wt 123 kg (272 lb)   SpO2 99%   BMI 42 60 kg/m²        Physical Exam     Physical Exam   Constitutional: She is oriented to person, place, and time  She appears well-developed and well-nourished  No distress  HENT:   Head: Normocephalic and atraumatic  Right Ear: Tympanic membrane, external ear and ear canal normal    Left Ear: Tympanic membrane, external ear and ear canal normal    Nose: Nose normal    Mouth/Throat: Uvula is midline, oropharynx is clear and moist and mucous membranes are normal    Eyes: Pupils are equal, round, and reactive to light  Conjunctivae and EOM are normal  Right eye exhibits no discharge  Left eye exhibits no discharge  Neck: Normal range of motion  Cardiovascular: Normal rate, regular rhythm and normal heart sounds  Exam reveals no gallop and no friction rub  No murmur heard  Pulmonary/Chest: Effort normal and breath sounds normal  No respiratory distress  She has no wheezes  She has no rales  Musculoskeletal: Normal range of motion  She exhibits no edema, tenderness or deformity  Neurological: She is alert and oriented to person, place, and time  No cranial nerve deficit  Coordination normal    Skin: Skin is warm and dry   No rash noted  She is not diaphoretic  No erythema  Psychiatric: She has a normal mood and affect  Her behavior is normal  Judgment and thought content normal    Nursing note and vitals reviewed

## 2019-10-29 NOTE — PATIENT INSTRUCTIONS
Reviewed labs- TSH is now worse at 51 on Levothyroxine generic 200mcg daily, will switch to BRAND NAME ONLY 200mcg daily, recheck labs in 6 weeks  Lisinopril increase to 20mg daily (2 tablets)  Referral given for Dr Trae Mota for known sleep apnea (previously on CPAP- was taken away when lost insurance)  US head/neck for neck swelling/trouble swallowing  Will give GI/ENT consult if US normal to further investigate trouble swallowing  Return in 6 weeks- get repeat TSH prior to appointment  Return sooner if needed for problems/concerns

## 2019-11-02 ENCOUNTER — OFFICE VISIT (OUTPATIENT)
Dept: URGENT CARE | Facility: CLINIC | Age: 34
End: 2019-11-02
Payer: COMMERCIAL

## 2019-11-02 VITALS
HEART RATE: 78 BPM | WEIGHT: 272 LBS | SYSTOLIC BLOOD PRESSURE: 132 MMHG | TEMPERATURE: 99.1 F | DIASTOLIC BLOOD PRESSURE: 71 MMHG | HEIGHT: 67 IN | BODY MASS INDEX: 42.69 KG/M2 | RESPIRATION RATE: 16 BRPM | OXYGEN SATURATION: 100 %

## 2019-11-02 DIAGNOSIS — K11.20 SIALOADENITIS OF SUBMANDIBULAR GLAND: Primary | ICD-10-CM

## 2019-11-02 PROCEDURE — S9088 SERVICES PROVIDED IN URGENT: HCPCS | Performed by: PHYSICIAN ASSISTANT

## 2019-11-02 PROCEDURE — 99213 OFFICE O/P EST LOW 20 MIN: CPT | Performed by: PHYSICIAN ASSISTANT

## 2019-11-02 RX ORDER — AMOXICILLIN AND CLAVULANATE POTASSIUM 875; 125 MG/1; MG/1
1 TABLET, FILM COATED ORAL EVERY 12 HOURS SCHEDULED
Qty: 20 TABLET | Refills: 0 | Status: SHIPPED | OUTPATIENT
Start: 2019-11-02 | End: 2019-11-12

## 2019-11-02 NOTE — PATIENT INSTRUCTIONS
Suck on lemon drops or sour balls  Augmentin as prescribed  If not improving follow up with ear nose and throat

## 2019-11-02 NOTE — PROGRESS NOTES
[facility]    NAME: Stiven Matute is a 37 y o  female  : 1985    MRN: [id number]  DATE: 2019  TIME: 3:19 PM    Assessment and Plan   Sialoadenitis of submandibular gland [K11 20]  1  Sialoadenitis of submandibular gland  amoxicillin-clavulanate (AUGMENTIN) 875-125 mg per tablet    Ambulatory Referral to Otolaryngology       Patient Instructions     Patient Instructions   Suck on lemon drops or sour balls  Augmentin as prescribed  If not improving follow up with ear nose and throat  Chief Complaint     Chief Complaint   Patient presents with    Swollen Glands     left today    Vomiting       History of Present Illness   52-year-old female here with complaint of pain and swelling under her left jaw  Started after she was vomiting  Pain is worse with eating  Vomiting has subsided  Dealing vomited a few times this morning  No fever or chills  Review of Systems   Review of Systems   Constitutional: Negative for appetite change, chills and fever  HENT: Negative for congestion, ear discharge, ear pain, facial swelling, postnasal drip, sinus pressure, sneezing and sore throat  Swelling under left jaw, see HPI   Respiratory: Negative for cough, shortness of breath and wheezing  Gastrointestinal: Positive for vomiting (Vomited twice this morning)  Negative for abdominal pain and nausea  Neurological: Negative for headaches         Current Medications     Current Outpatient Medications:     lisinopril (ZESTRIL) 10 mg tablet, Take 1 tablet (10 mg total) by mouth daily, Disp: 90 tablet, Rfl: 1    SYNTHROID 200 MCG tablet, Take 1 tablet (200 mcg total) by mouth daily BRAND NAME ONLY- TSH 51, worse since on generic Levothyroxine, Disp: 90 tablet, Rfl: 1    amoxicillin-clavulanate (AUGMENTIN) 875-125 mg per tablet, Take 1 tablet by mouth every 12 (twelve) hours for 10 days, Disp: 20 tablet, Rfl: 0    fluticasone (FLONASE) 50 mcg/act nasal spray, 2 sprays into each nostril daily, Disp: 1 Bottle, Rfl: 1    Current Allergies     Allergies as of 2019 - Reviewed 2019   Allergen Reaction Noted    Other  08/15/2018    Vancomycin  08/15/2018          The following portions of the patient's history were reviewed and updated as appropriate: allergies, current medications, past family history, past medical history, past social history, past surgical history and problem list    Past Medical History:   Diagnosis Date    Acute sinus infection     Anxiety     Asthma     Depression     Disease of thyroid gland     GERD (gastroesophageal reflux disease)     Heel spur     right    Hypertension     MRSA (methicillin resistant Staphylococcus aureus)     right leg & right leg inner thigh    Neck pain     Obesity     Sleep disturbance      Past Surgical History:   Procedure Laterality Date     SECTION      x1 live birth   [address]  1     Family History   Problem Relation Age of Onset    Cervical cancer Mother     Hypertension Mother     Hypertension Father      Social History     Socioeconomic History    Marital status: /Civil Union     Spouse name: Not on file    Number of children: 2    Years of education: Not on file    Highest education level: Not on file   Occupational History    Occupation: employed   Social Needs    Financial resource strain: Not on file    Food insecurity:     Worry: Not on file     Inability: Not on file   [employer] needs:     Medical: Not on file     Non-medical: Not on file   Tobacco Use    Smoking status: Never Smoker    Smokeless tobacco: Never Used   Substance and Sexual Activity    Alcohol use: Yes     Comment: socially    Drug use: No    Sexual activity: Not on file   Lifestyle    Physical activity:     Days per week: Not on file     Minutes per session: Not on file    Stress: Not on file   Relationships    Social connections:     Talks on phone: Not on file     Gets together: Not on file     Attends Taoism service: Not on file     Active member of club or organization: Not on file     Attends meetings of clubs or organizations: Not on file     Relationship status: Not on file    Intimate partner violence:     Fear of current or ex partner: Not on file     Emotionally abused: Not on file     Physically abused: Not on file     Forced sexual activity: Not on file   Other Topics Concern    Not on file   Social History Narrative    Not on file     Medications have been verified  Objective   /71   Pulse 78   Temp 99 1 °F (37 3 °C)   Resp 16   Ht 5' 7" (1 702 m)   Wt 123 kg (272 lb)   SpO2 100%   BMI 42 60 kg/m²      Physical Exam   Physical Exam   Constitutional: She appears well-developed and well-nourished  No distress  HENT:   Head: Normocephalic and atraumatic  Right Ear: Tympanic membrane normal    Left Ear: Tympanic membrane normal    Nose: Nose normal  No mucosal edema or rhinorrhea  Right sinus exhibits no maxillary sinus tenderness and no frontal sinus tenderness  Left sinus exhibits no maxillary sinus tenderness and no frontal sinus tenderness  Mouth/Throat: Oropharynx is clear and moist  No oropharyngeal exudate, posterior oropharyngeal edema or posterior oropharyngeal erythema  Eyes: Conjunctivae are normal    Neck:       Cardiovascular: Normal rate, regular rhythm and normal heart sounds  No murmur heard  Pulmonary/Chest: Effort normal and breath sounds normal  No respiratory distress  Nursing note and vitals reviewed

## 2019-12-13 ENCOUNTER — APPOINTMENT (OUTPATIENT)
Dept: LAB | Facility: CLINIC | Age: 34
End: 2019-12-13
Payer: COMMERCIAL

## 2019-12-13 DIAGNOSIS — E03.9 HYPOTHYROIDISM, UNSPECIFIED TYPE: ICD-10-CM

## 2019-12-13 LAB
T4 FREE SERPL-MCNC: 0.92 NG/DL (ref 0.76–1.46)
TSH SERPL DL<=0.05 MIU/L-ACNC: 5 UIU/ML (ref 0.36–3.74)

## 2019-12-13 PROCEDURE — 84439 ASSAY OF FREE THYROXINE: CPT

## 2019-12-13 PROCEDURE — 84443 ASSAY THYROID STIM HORMONE: CPT

## 2019-12-13 PROCEDURE — 36415 COLL VENOUS BLD VENIPUNCTURE: CPT

## 2019-12-18 ENCOUNTER — OFFICE VISIT (OUTPATIENT)
Dept: URGENT CARE | Facility: CLINIC | Age: 34
End: 2019-12-18
Payer: COMMERCIAL

## 2019-12-18 VITALS
TEMPERATURE: 98.3 F | RESPIRATION RATE: 18 BRPM | HEART RATE: 68 BPM | DIASTOLIC BLOOD PRESSURE: 80 MMHG | OXYGEN SATURATION: 99 % | SYSTOLIC BLOOD PRESSURE: 159 MMHG

## 2019-12-18 DIAGNOSIS — K11.5 SIALOLITHIASIS OF SUBMANDIBULAR GLAND: ICD-10-CM

## 2019-12-18 DIAGNOSIS — K11.20 SIALOADENITIS: Primary | ICD-10-CM

## 2019-12-18 PROCEDURE — S9088 SERVICES PROVIDED IN URGENT: HCPCS | Performed by: PHYSICIAN ASSISTANT

## 2019-12-18 PROCEDURE — 99213 OFFICE O/P EST LOW 20 MIN: CPT | Performed by: PHYSICIAN ASSISTANT

## 2019-12-18 RX ORDER — AMOXICILLIN AND CLAVULANATE POTASSIUM 400; 57 MG/5ML; MG/5ML
10 POWDER, FOR SUSPENSION ORAL 2 TIMES DAILY
Qty: 200 ML | Refills: 0 | Status: SHIPPED | OUTPATIENT
Start: 2019-12-18 | End: 2019-12-28

## 2019-12-18 NOTE — PROGRESS NOTES
[facility]    NAME: Aleja Cazares is a 37 y o  female  : 1985    MRN: [id number]  DATE: 2019  TIME: 10:19 AM    Assessment and Plan   Sialoadenitis [K11 20]  1  Sialoadenitis  amoxicillin-clavulanate (AUGMENTIN) 400-57 mg/5 mL suspension   2  Sialolithiasis of submandibular gland         Patient Instructions     Patient Instructions   I have prescribed an antibiotic for the infection  Please take the antibiotic as prescribed and finish the entire prescription  I recommend that the patient takes an over the counter probiotic or eats yogurt with live cultures in it [place]) to keep good bacteria in the gut and help prevent diarrhea  Suck on sour candy  Follow up with ENT  Go to the emergency room if worsening  Chief Complaint     Chief Complaint   Patient presents with    Cough     Pt c/o cough, congestion and swollen lymph nodes since Monday  History of Present Illness   57-year-old female here with complaint left sided jaw swelling and pain  Patient states that it gets worse when she eats  She had this before and had a stone in her salivary gland  No fever or chills  She states that when she eats or socks on something sour the gland gets bigger  Noticed some white stuff coming out of the duct underneath her tongue yesterday  Review of Systems   Review of Systems   Constitutional: Negative for appetite change, chills and fever  HENT: Negative for congestion, ear discharge, ear pain, facial swelling, postnasal drip, sinus pressure, sneezing and sore throat  Swollen submandibular gland left jaw   Respiratory: Negative for cough, shortness of breath and wheezing  Neurological: Negative for headaches         Current Medications     Current Outpatient Medications:     amoxicillin-clavulanate (AUGMENTIN) 400-57 mg/5 mL suspension, Take 10 mL by mouth 2 (two) times a day for 10 days, Disp: 200 mL, Rfl: 0    fluticasone (FLONASE) 50 mcg/act nasal spray, 2 sprays into each nostril daily, Disp: 1 Bottle, Rfl: 1    lisinopril (ZESTRIL) 10 mg tablet, Take 1 tablet (10 mg total) by mouth daily, Disp: 90 tablet, Rfl: 1    SYNTHROID 200 MCG tablet, Take 1 tablet (200 mcg total) by mouth daily BRAND NAME ONLY- TSH 51, worse since on generic Levothyroxine, Disp: 90 tablet, Rfl: 1    Current Allergies     Allergies as of 2019 - Reviewed 2019   Allergen Reaction Noted    Other  08/15/2018    Vancomycin  08/15/2018          The following portions of the patient's history were reviewed and updated as appropriate: allergies, current medications, past family history, past medical history, past social history, past surgical history and problem list    Past Medical History:   Diagnosis Date    Acute sinus infection     Anxiety     Asthma     Depression     Disease of thyroid gland     GERD (gastroesophageal reflux disease)     Heel spur     right    Hypertension     MRSA (methicillin resistant Staphylococcus aureus)     right leg & right leg inner thigh    Neck pain     Obesity     Sleep disturbance      Past Surgical History:   Procedure Laterality Date     SECTION      x1 live birth   [address]  1     Family History   Problem Relation Age of Onset    Cervical cancer Mother     Hypertension Mother     Hypertension Father      Social History     Socioeconomic History    Marital status: /Civil Union     Spouse name: Not on file    Number of children: 2    Years of education: Not on file    Highest education level: Not on file   Occupational History    Occupation: employed   Social Needs    Financial resource strain: Not on file    Food insecurity:     Worry: Not on file     Inability: Not on file   [employer] needs:     Medical: Not on file     Non-medical: Not on file   Tobacco Use    Smoking status: Never Smoker    Smokeless tobacco: Never Used   Substance and Sexual Activity    Alcohol use: Yes     Comment: socially    Drug use: No    Sexual activity: Not on file   Lifestyle    Physical activity:     Days per week: Not on file     Minutes per session: Not on file    Stress: Not on file   Relationships    Social connections:     Talks on phone: Not on file     Gets together: Not on file     Attends Ep[employer] service: Not on file     Active member of club or organization: Not on file     Attends meetings of clubs or organizations: Not on file     Relationship status: Not on file    Intimate partner violence:     Fear of current or ex partner: Not on file     Emotionally abused: Not on file     Physically abused: Not on file     Forced sexual activity: Not on file   Other Topics Concern    Not on file   Social History Narrative    Not on file     Medications have been verified  Objective   /80   Pulse 68   Temp 98 3 °F (36 8 °C)   Resp 18   SpO2 99%      Physical Exam   Physical Exam   Constitutional: She appears well-developed and well-nourished  No distress  HENT:   Head: Normocephalic and atraumatic  Right Ear: Tympanic membrane normal    Left Ear: Tympanic membrane normal    Nose: Nose normal  No mucosal edema or rhinorrhea  Right sinus exhibits no maxillary sinus tenderness and no frontal sinus tenderness  Left sinus exhibits no maxillary sinus tenderness and no frontal sinus tenderness  Mouth/Throat: Oropharynx is clear and moist  No oropharyngeal exudate, posterior oropharyngeal edema or posterior oropharyngeal erythema  Eyes: Conjunctivae are normal    Cardiovascular: Normal rate, regular rhythm and normal heart sounds  No murmur heard  Pulmonary/Chest: Effort normal and breath sounds normal  No respiratory distress  Nursing note and vitals reviewed

## 2019-12-18 NOTE — PATIENT INSTRUCTIONS
I have prescribed an antibiotic for the infection  Please take the antibiotic as prescribed and finish the entire prescription  I recommend that the patient takes an over the counter probiotic or eats yogurt with live cultures in it [place]) to keep good bacteria in the gut and help prevent diarrhea  Suck on sour candy  Follow up with ENT  Go to the emergency room if worsening

## 2020-01-16 ENCOUNTER — OFFICE VISIT (OUTPATIENT)
Dept: FAMILY MEDICINE CLINIC | Facility: CLINIC | Age: 35
End: 2020-01-16
Payer: COMMERCIAL

## 2020-01-16 VITALS
SYSTOLIC BLOOD PRESSURE: 136 MMHG | HEIGHT: 67 IN | RESPIRATION RATE: 20 BRPM | OXYGEN SATURATION: 99 % | HEART RATE: 78 BPM | WEIGHT: 274 LBS | BODY MASS INDEX: 43 KG/M2 | TEMPERATURE: 99.1 F | DIASTOLIC BLOOD PRESSURE: 78 MMHG

## 2020-01-16 DIAGNOSIS — I10 HYPERTENSION, UNSPECIFIED TYPE: ICD-10-CM

## 2020-01-16 DIAGNOSIS — N63.0 LUMP OR MASS IN BREAST: ICD-10-CM

## 2020-01-16 DIAGNOSIS — J04.0 LARYNGITIS: ICD-10-CM

## 2020-01-16 DIAGNOSIS — E03.9 ACQUIRED HYPOTHYROIDISM: Primary | ICD-10-CM

## 2020-01-16 DIAGNOSIS — R22.1 NECK SWELLING: ICD-10-CM

## 2020-01-16 PROCEDURE — 3075F SYST BP GE 130 - 139MM HG: CPT | Performed by: NURSE PRACTITIONER

## 2020-01-16 PROCEDURE — 3008F BODY MASS INDEX DOCD: CPT | Performed by: NURSE PRACTITIONER

## 2020-01-16 PROCEDURE — 3078F DIAST BP <80 MM HG: CPT | Performed by: NURSE PRACTITIONER

## 2020-01-16 PROCEDURE — 99213 OFFICE O/P EST LOW 20 MIN: CPT | Performed by: NURSE PRACTITIONER

## 2020-01-16 RX ORDER — LISINOPRIL 20 MG/1
20 TABLET ORAL DAILY
Qty: 90 TABLET | Refills: 1 | Status: SHIPPED | OUTPATIENT
Start: 2020-01-16 | End: 2020-05-01 | Stop reason: SDUPTHER

## 2020-01-16 RX ORDER — LEVOTHYROXINE SODIUM 0.03 MG/1
25 TABLET ORAL DAILY
Qty: 90 TABLET | Refills: 1 | Status: SHIPPED | OUTPATIENT
Start: 2020-01-16 | End: 2020-05-01

## 2020-01-16 RX ORDER — LISINOPRIL 10 MG/1
TABLET ORAL
Qty: 90 TABLET | Refills: 0 | OUTPATIENT
Start: 2020-01-16

## 2020-01-16 NOTE — PROGRESS NOTES
[facility]        NAME: Fab Murguia is a 37 y o  female  : 1985    MRN: [id number]  DATE: 2020  TIME: 10:21 AM    Assessment and Plan   Acquired hypothyroidism [E03 9]  1  Acquired hypothyroidism  levothyroxine 25 mcg tablet    TSH, 3rd generation with Free T4 reflex   2  Laryngitis  Ambulatory Referral to Otolaryngology   3  Neck swelling  Ambulatory Referral to Otolaryngology   4  Hypertension, unspecified type  lisinopril (ZESTRIL) 20 mg tablet   5  Lump or mass in breast  Mammo diagnostic bilateral w cad         Patient Instructions     Patient Instructions   F/u with ENT as discussed- referral given  mammo re-ordered  Increase Synthroid (brand only) to 225mcg daily  Recheck TSH in 6-8 weeks  Increase Lisinopril to 20mg daily as discussed  Return in 4 months or sooner if needed          Chief Complaint     Chief Complaint   Patient presents with    Labs Only         History of Present Illness       Pt  Here for f/u  She got insurance back- new plan- did not  increased Synthroid dose- needs 25mcg tablets to add to 200mcg daily  She continues to have swelling in her face and neck with laryngitis and feeling like she can't swallow- this gives her panic attacks and anxiety- symptoms worse at night  She declines anxiety treatment at this time  Reports that her blood pressure continues to go high at times- she gets redness and flushing in her arms when it is high- would like to increase Lisinopril dose  Continues to struggle with losing weight      Review of Systems   Review of Systems   Constitutional: Positive for fatigue  Negative for activity change, diaphoresis and fever  HENT: Positive for trouble swallowing and voice change  Negative for congestion, facial swelling, hearing loss, rhinorrhea, sinus pressure, sinus pain, sneezing and sore throat  Eyes: Negative for discharge and visual disturbance     Respiratory: Negative for cough, choking, chest tightness, shortness of breath, wheezing and stridor  Cardiovascular: Negative for chest pain, palpitations and leg swelling  Gastrointestinal: Negative for abdominal distention, abdominal pain, constipation, diarrhea, nausea and vomiting  Endocrine: Negative for polydipsia, polyphagia and polyuria  Genitourinary: Negative for difficulty urinating, dysuria, frequency and urgency  Musculoskeletal: Negative for arthralgias, back pain, gait problem, joint swelling, myalgias, neck pain and neck stiffness  Skin: Negative for color change, rash and wound  Neurological: Negative for dizziness, syncope, speech difficulty, weakness, light-headedness and headaches  Hematological: Negative for adenopathy  Does not bruise/bleed easily  Psychiatric/Behavioral: Negative for agitation, behavioral problems, confusion, hallucinations, sleep disturbance and suicidal ideas  The patient is nervous/anxious            Current Medications       Current Outpatient Medications:     fluticasone (FLONASE) 50 mcg/act nasal spray, 2 sprays into each nostril daily, Disp: 1 Bottle, Rfl: 1    levothyroxine 25 mcg tablet, Take 1 tablet (25 mcg total) by mouth daily BRAND NAME SYNTHROID ONLY Add 25mcg to 200mcg for 225mcg daily, Disp: 90 tablet, Rfl: 1    lisinopril (ZESTRIL) 20 mg tablet, Take 1 tablet (20 mg total) by mouth daily, Disp: 90 tablet, Rfl: 1    SYNTHROID 200 MCG tablet, Take 1 tablet (200 mcg total) by mouth daily BRAND NAME ONLY- TSH 51, worse since on generic Levothyroxine, Disp: 90 tablet, Rfl: 1    Current Allergies     Allergies as of 01/16/2020 - Reviewed 01/16/2020   Allergen Reaction Noted    Other  08/15/2018    Vancomycin  08/15/2018            The following portions of the patient's history were reviewed and updated as appropriate: allergies, current medications, past family history, past medical history, past social history, past surgical history and problem list      Past Medical History: Diagnosis Date    Acute sinus infection     Anxiety     Asthma     Depression     Disease of thyroid gland     GERD (gastroesophageal reflux disease)     Heel spur     right    Hypertension     MRSA (methicillin resistant Staphylococcus aureus)     right leg & right leg inner thigh    Neck pain     Obesity     Sleep disturbance        Past Surgical History:   Procedure Laterality Date     SECTION      x1 live birth   Cesar N[facility]ajan  SECTION      THYROIDECTOMY  1       Family History   Problem Relation Age of Onset    Cervical cancer Mother     Hypertension Mother     Hypertension Father          Medications have been verified  Objective   /78   Pulse 78   Temp 99 1 °F (37 3 °C) (Tympanic)   Resp 20   Ht 5' 7" (1 702 m)   Wt 124 kg (274 lb)   SpO2 99%   BMI 42 91 kg/m²        Physical Exam     Physical Exam   Constitutional: She is oriented to person, place, and time  Vital signs are normal  She appears well-developed and well-nourished  She is active and cooperative  No distress  HENT:   Mouth/Throat: No oropharyngeal exudate, posterior oropharyngeal edema or posterior oropharyngeal erythema  Tonsils are 3+ on the right  Tonsils are 3+ on the left  Eyes: EOM are normal    Neck: Trachea normal and normal range of motion  No tracheal tenderness present  No tracheal deviation present  No thyroid mass and no thyromegaly present  Cardiovascular: Normal rate, regular rhythm and normal heart sounds  No murmur heard  Pulmonary/Chest: Effort normal and breath sounds normal  No respiratory distress  She has no wheezes  Neurological: She is alert and oriented to person, place, and time  Skin: Skin is warm and dry  No rash noted  She is not diaphoretic  No erythema  Psychiatric: She has a normal mood and affect  Her behavior is normal  Judgment and thought content normal    Nursing note and vitals reviewed

## 2020-01-16 NOTE — PATIENT INSTRUCTIONS
F/u with ENT as discussed- referral given  mammo re-ordered  Increase Synthroid (brand only) to 225mcg daily  Recheck TSH in 6-8 weeks  Increase Lisinopril to 20mg daily as discussed  Return in 4 months or sooner if needed

## 2020-01-20 ENCOUNTER — TELEPHONE (OUTPATIENT)
Dept: FAMILY MEDICINE CLINIC | Facility: CLINIC | Age: 35
End: 2020-01-20

## 2020-01-20 NOTE — TELEPHONE ENCOUNTER
Hospital scheduling called regarding patients mammo  Diagnostic mammo are only done on Fridays at the [address]  I rescheduled the patient for 1/24/20 at 10:30 am  Patient made aware

## 2020-01-24 ENCOUNTER — HOSPITAL ENCOUNTER (OUTPATIENT)
Dept: MAMMOGRAPHY | Facility: HOSPITAL | Age: 35
Discharge: HOME | End: 2020-01-24
Payer: COMMERCIAL

## 2020-01-24 ENCOUNTER — HOSPITAL ENCOUNTER (OUTPATIENT)
Dept: ULTRASOUND IMAGING | Facility: HOSPITAL | Age: 35
Discharge: HOME | End: 2020-01-24
Payer: COMMERCIAL

## 2020-01-24 VITALS — HEIGHT: 67 IN | BODY MASS INDEX: 43.16 KG/M2 | WEIGHT: 275 LBS

## 2020-01-24 DIAGNOSIS — N63.0 LUMP OR MASS IN BREAST: ICD-10-CM

## 2020-01-24 PROCEDURE — 77066 DX MAMMO INCL CAD BI: CPT

## 2020-01-24 PROCEDURE — 76642 ULTRASOUND BREAST LIMITED: CPT

## 2020-01-24 PROCEDURE — G0279 TOMOSYNTHESIS, MAMMO: HCPCS

## 2020-02-08 ENCOUNTER — APPOINTMENT (OUTPATIENT)
Dept: LAB | Facility: HOSPITAL | Age: 35
End: 2020-02-08
Payer: COMMERCIAL

## 2020-02-08 ENCOUNTER — TRANSCRIBE ORDERS (OUTPATIENT)
Dept: LAB | Facility: HOSPITAL | Age: 35
End: 2020-02-08

## 2020-02-08 DIAGNOSIS — E03.9 ACQUIRED HYPOTHYROIDISM: ICD-10-CM

## 2020-02-08 LAB — TSH SERPL DL<=0.05 MIU/L-ACNC: 3.45 UIU/ML (ref 0.45–5.33)

## 2020-02-08 PROCEDURE — 84443 ASSAY THYROID STIM HORMONE: CPT

## 2020-02-11 ENCOUNTER — OFFICE VISIT (OUTPATIENT)
Dept: URGENT CARE | Facility: CLINIC | Age: 35
End: 2020-02-11
Payer: COMMERCIAL

## 2020-02-11 VITALS
BODY MASS INDEX: 43.16 KG/M2 | DIASTOLIC BLOOD PRESSURE: 80 MMHG | WEIGHT: 275 LBS | HEIGHT: 67 IN | OXYGEN SATURATION: 99 % | SYSTOLIC BLOOD PRESSURE: 128 MMHG | RESPIRATION RATE: 20 BRPM | TEMPERATURE: 99.7 F | HEART RATE: 91 BPM

## 2020-02-11 DIAGNOSIS — J01.90 ACUTE NON-RECURRENT SINUSITIS, UNSPECIFIED LOCATION: Primary | ICD-10-CM

## 2020-02-11 PROCEDURE — 99213 OFFICE O/P EST LOW 20 MIN: CPT | Performed by: PHYSICIAN ASSISTANT

## 2020-02-11 RX ORDER — AMOXICILLIN AND CLAVULANATE POTASSIUM 875; 125 MG/1; MG/1
1 TABLET, FILM COATED ORAL EVERY 12 HOURS SCHEDULED
Qty: 14 TABLET | Refills: 0 | Status: SHIPPED | OUTPATIENT
Start: 2020-02-11 | End: 2020-02-18

## 2020-02-11 NOTE — PATIENT INSTRUCTIONS
Start Augmentin as prescribed  Patient to take this on a full stomach to avoid possible diarrhea  He may also take probiotics any yogurt with live cultures to replace the good bacteria in her stomach  Tylenol Motrin as needed for fever pain may take over-the-counter cold remedies for any symptoms  If symptoms worsen go to the emergency room for further evaluation  Sinusitis, Ambulatory Care   GENERAL INFORMATION:   Sinusitis  is inflammation or infection of your sinuses  It is most often caused by a virus  Acute sinusitis may last up to 12 weeks  Chronic sinusitis lasts longer than 12 weeks  Recurrent sinusitis is when you have 3 or more episodes of sinusitis in 1 year  Common symptoms include the following:   · Fever    · Pain, pressure, redness, or swelling around the forehead, cheeks, or eyes    · Thick yellow or green discharge from your nose    · Tenderness when you touch your face over your sinuses    · Dry cough that happens mostly at night or when you lie down    · Headache and face pain that is worse when you lean forward    · Teeth pain or pain when you chew  Seek immediate care for the following symptoms:   · Vision changes such as double vision    · Confusion or trouble thinking clearly    · Headache and stiff neck    · Trouble breathing  Treatment for sinusitis  may include medicines to relieve nasal and sinus congestion or to decrease pain and fever  Ask your healthcare provider which medicines you should take and how much is safe  Manage sinusitis:   · Drink liquids as directed  Ask your healthcare provider how much liquid to drink each day and which liquids are best for you  Liquids will help loosen and drain the mucus in your sinuses  · Breathe in steam   Heat a bowl of water until you see steam  Lean over the bowl and make a tent over your head with a large towel  Breathe deeply for about 20 minutes  Be careful not to get too close to the steam or burn yourself   Do this 3 times a day  You can also breathe deeply when you take a hot shower  · Rinse your sinuses  Use a sinus rinse device to rinse your nasal passages with a saline (salt water) solution  This will help thin the mucus in your nose and rinse away pollen and dirt  It will also help reduce swelling so you can breathe normally  Ask how often to do this  · Use heat on your sinuses  to decrease pain  Apply heat for 15 to 20 minutes every hour for as many days as directed  · Sleep with your head elevated  Place an extra pillow under your head before you go to sleep to help your sinuses drain  · Do not smoke and avoid secondhand smoke  If you smoke, it is never too late to quit  Ask for information about how to stop smoking if you need help  Prevent the spread of germs that cause sinusitis:  Wash your hands often with soap and water  Wash your hands after you use the bathroom, change a child's diaper, or sneeze  Wash your hands before you prepare or eat food  Follow up with your healthcare provider as directed:  Write down your questions so you remember to ask them during your visits  CARE AGREEMENT:   You have the right to help plan your care  Learn about your health condition and how it may be treated  Discuss treatment options with your caregivers to decide what care you want to receive  You always have the right to refuse treatment  The above information is an  only  It is not intended as medical advice for individual conditions or treatments  Talk to your doctor, nurse or pharmacist before following any medical regimen to see if it is safe and effective for you  © 2014 [address] is for End User's use only and may not be sold, redistributed or otherwise used for commercial purposes  All illustrations and images included in CareNotes® are the copyrighted property of [employer]  or [employer]

## 2020-02-11 NOTE — PROGRESS NOTES
[facility]        NAME: Jaswinder Singleton is a 37 y o  female  : 1985    MRN: [id number]  DATE: 2020  TIME: 10:40 AM    Assessment and Plan   Acute non-recurrent sinusitis, unspecified location [J01 90]  1  Acute non-recurrent sinusitis, unspecified location  amoxicillin-clavulanate (AUGMENTIN) 875-125 mg per tablet         Patient Instructions     Start Augmentin as prescribed  Patient to take this on a full stomach to avoid possible diarrhea  He may also take probiotics any yogurt with live cultures to replace the good bacteria in her stomach  Tylenol Motrin as needed for fever pain may take over-the-counter cold remedies for any symptoms  If symptoms worsen go to the emergency room for further evaluation  Follow up with PCP in 3-5 days  Proceed to  ER if symptoms worsen  Chief Complaint     Chief Complaint   Patient presents with    Cough     cough, fever, sore throat, left ear pain for for 2 days         History of Present Illness       Patient presents with a 2 day history of low-grade fever frontal headaches cough sore throat left ear pain  Denies any chest pain shortness of breath nausea vomiting diarrhea  She has a lot of purulent nasal drainage the cough is productive the same colored mucus  Review of Systems   Review of Systems   Constitutional: Positive for fever  Negative for activity change, appetite change and chills  HENT: Positive for congestion, ear pain, postnasal drip, rhinorrhea, sinus pressure, sinus pain and sore throat  Respiratory: Positive for cough  Negative for chest tightness, shortness of breath and wheezing  Cardiovascular: Negative for chest pain  Gastrointestinal: Negative for diarrhea, nausea and vomiting  Musculoskeletal: Negative for myalgias  Skin: Negative for rash  Neurological: Positive for headaches (Frontal)  Hematological: Negative for adenopathy           Current Medications       Current Outpatient Medications:   fluticasone (FLONASE) 50 mcg/act nasal spray, 2 sprays into each nostril daily, Disp: 1 Bottle, Rfl: 10    levothyroxine 25 mcg tablet, Take 1 tablet (25 mcg total) by mouth daily BRAND NAME SYNTHROID ONLY Add 25mcg to 200mcg for 225mcg daily, Disp: 90 tablet, Rfl: 1    lisinopril (ZESTRIL) 20 mg tablet, Take 1 tablet (20 mg total) by mouth daily, Disp: 90 tablet, Rfl: 1    SYNTHROID 200 MCG tablet, Take 1 tablet (200 mcg total) by mouth daily BRAND NAME ONLY- TSH 51, worse since on generic Levothyroxine, Disp: 90 tablet, Rfl: 1    amoxicillin-clavulanate (AUGMENTIN) 875-125 mg per tablet, Take 1 tablet by mouth every 12 (twelve) hours for 7 days, Disp: 14 tablet, Rfl: 0    fluticasone (FLONASE) 50 mcg/act nasal spray, 2 sprays into each nostril daily (Patient not taking: Reported on 2020), Disp: 1 Bottle, Rfl: 1    Current Allergies     Allergies as of 2020 - Reviewed 2020   Allergen Reaction Noted    Other  08/15/2018    Vancomycin  08/15/2018            The following portions of the patient's history were reviewed and updated as appropriate: allergies, current medications, past family history, past medical history, past social history, past surgical history and problem list      Past Medical History:   Diagnosis Date    Acute sinus infection     Anxiety     Asthma     Depression     Disease of thyroid gland     GERD (gastroesophageal reflux disease)     Heel spur     right    Hypertension     MRSA (methicillin resistant Staphylococcus aureus)     right leg & right leg inner thigh    Neck pain     Obesity     Sleep disturbance        Past Surgical History:   Procedure Laterality Date    BREAST BIOPSY Right     benign     SECTION      x1 live birth   [facility]  SECTION      LEG SURGERY Right 2015    removal of MRSA    THYROIDECTOMY         Family History   Problem Relation Age of Onset    Cervical cancer Mother     Hypertension Mother     Hypertension Father     Heart disease Maternal Uncle     Breast cancer Paternal Grandmother     No Known Problems Sister     No Known Problems Daughter     No Known Problems Maternal Grandmother     No Known Problems Maternal Grandfather     No Known Problems Paternal Grandfather     No Known Problems Sister     No Known Problems Daughter     No Known Problems Paternal Aunt     No Known Problems Paternal Aunt     No Known Problems Paternal Aunt     No Known Problems Paternal Aunt     No Known Problems Paternal Aunt     No Known Problems Paternal Aunt     No Known Problems Paternal Aunt          Medications have been verified  Objective   /80   Pulse 91   Temp 99 7 °F (37 6 °C) (Tympanic)   Resp 20   Ht 5' 7" (1 702 m)   Wt 125 kg (275 lb)   LMP 01/21/2020 (Exact Date)   SpO2 99%   BMI 43 07 kg/m²        Physical Exam     Physical Exam   Constitutional: She is oriented to person, place, and time  She appears well-developed and well-nourished  HENT:   Head: Normocephalic and atraumatic  Right Ear: External ear normal    Left Ear: External ear normal    Mouth/Throat: Oropharynx is clear and moist    Left-sided nasal congestion  Eyes: Conjunctivae are normal    Neck: Normal range of motion  Neck supple  Cardiovascular: Normal rate, regular rhythm and normal heart sounds  Pulmonary/Chest: Effort normal and breath sounds normal    Lymphadenopathy:     She has no cervical adenopathy  Neurological: She is alert and oriented to person, place, and time  Skin: Skin is warm and dry  No rash noted  Psychiatric: She has a normal mood and affect  Her behavior is normal    Nursing note and vitals reviewed

## 2020-02-12 ENCOUNTER — OFFICE VISIT (OUTPATIENT)
Dept: FAMILY MEDICINE CLINIC | Facility: CLINIC | Age: 35
End: 2020-02-12
Payer: COMMERCIAL

## 2020-02-12 VITALS
DIASTOLIC BLOOD PRESSURE: 74 MMHG | BODY MASS INDEX: 42.22 KG/M2 | OXYGEN SATURATION: 99 % | HEIGHT: 67 IN | WEIGHT: 269 LBS | TEMPERATURE: 98.5 F | HEART RATE: 91 BPM | SYSTOLIC BLOOD PRESSURE: 124 MMHG | RESPIRATION RATE: 18 BRPM

## 2020-02-12 DIAGNOSIS — J06.9 VIRAL UPPER RESPIRATORY TRACT INFECTION: Primary | ICD-10-CM

## 2020-02-12 PROCEDURE — 1036F TOBACCO NON-USER: CPT | Performed by: NURSE PRACTITIONER

## 2020-02-12 PROCEDURE — 3008F BODY MASS INDEX DOCD: CPT | Performed by: NURSE PRACTITIONER

## 2020-02-12 PROCEDURE — 99214 OFFICE O/P EST MOD 30 MIN: CPT | Performed by: NURSE PRACTITIONER

## 2020-02-12 PROCEDURE — 3074F SYST BP LT 130 MM HG: CPT | Performed by: NURSE PRACTITIONER

## 2020-02-12 PROCEDURE — 3078F DIAST BP <80 MM HG: CPT | Performed by: NURSE PRACTITIONER

## 2020-02-12 RX ORDER — PREDNISONE 20 MG/1
40 TABLET ORAL DAILY
Qty: 10 TABLET | Refills: 0 | Status: SHIPPED | OUTPATIENT
Start: 2020-02-12 | End: 2020-02-17

## 2020-02-12 NOTE — PATIENT INSTRUCTIONS
Get plenty of rest  Drink plenty of fluids  Throat lozenges,hot tea with honey, salt water   Humidifier at night and steam from a hot shower  OTC decongestant and cough medication as recommended  Follow up with any new or worsening symptoms

## 2020-02-12 NOTE — PROGRESS NOTES
[facility]        NAME: Albin Gr is a 37 y o  female  : 1985    MRN: [id number]  DATE: 2020  TIME: 3:09 PM    Assessment and Plan   Viral upper respiratory tract infection [J06 9]  1  Viral upper respiratory tract infection  predniSONE 20 mg tablet       Prednisone started for congestion  Patient Instructions     Patient Instructions     Get plenty of rest  Drink plenty of fluids  Throat lozenges,hot tea with honey, salt water   Humidifier at night and steam from a hot shower  OTC decongestant and cough medication as recommended  Follow up with any new or worsening symptoms  Chief Complaint     Chief Complaint   Patient presents with    Fever    Sinusitis    Cough    Chills         History of Present Illness       Patient c/o cough, congestion, and sore throat     URI symptoms started Monday  Sore throat, non-productive cough, sinus pressure and congestion  Reported fevers and chills at home  Afebrile in the office today  Denies myalgias or headaches  Patient was seen in urgent care yesterday and was diagnosed with sinus infection and started on an antibiotic  Daughter has similar symptoms that began around the same time  URI    This is a new problem  The current episode started in the past 7 days  The problem has been unchanged  There has been no fever  Associated symptoms include congestion, coughing, rhinorrhea, sinus pain (pressure) and a sore throat  Pertinent negatives include no abdominal pain, chest pain, diarrhea, dysuria, ear pain, headaches, nausea, rash, sneezing, swollen glands, vomiting or wheezing  She has tried nothing for the symptoms  The treatment provided no relief  Review of Systems   Review of Systems   Constitutional: Positive for fever (per patient at home)  Negative for activity change, appetite change, chills and fatigue  HENT: Positive for congestion, rhinorrhea, sinus pain (pressure) and sore throat   Negative for ear pain, nosebleeds and sneezing  Eyes: Negative for photophobia, pain, redness and visual disturbance  Respiratory: Positive for cough  Negative for shortness of breath and wheezing  Cardiovascular: Negative  Negative for chest pain  Gastrointestinal: Negative  Negative for abdominal pain, constipation, diarrhea, nausea and vomiting  Endocrine: Negative  Genitourinary: Negative for difficulty urinating, dysuria and flank pain  Musculoskeletal: Negative  Skin: Negative for color change and rash  Neurological: Negative for dizziness, weakness, numbness and headaches  Hematological: Negative for adenopathy  Psychiatric/Behavioral: Negative for agitation and confusion  The patient is not nervous/anxious          PHQ-9 Depression Screening    PHQ-9:    Frequency of the following problems over the past two weeks:             Current Medications       Current Outpatient Medications:     amoxicillin-clavulanate (AUGMENTIN) 875-125 mg per tablet, Take 1 tablet by mouth every 12 (twelve) hours for 7 days, Disp: 14 tablet, Rfl: 0    fluticasone (FLONASE) 50 mcg/act nasal spray, 2 sprays into each nostril daily, Disp: 1 Bottle, Rfl: 10    levothyroxine 25 mcg tablet, Take 1 tablet (25 mcg total) by mouth daily BRAND NAME SYNTHROID ONLY Add 25mcg to 200mcg for 225mcg daily, Disp: 90 tablet, Rfl: 1    lisinopril (ZESTRIL) 20 mg tablet, Take 1 tablet (20 mg total) by mouth daily, Disp: 90 tablet, Rfl: 1    SYNTHROID 200 MCG tablet, Take 1 tablet (200 mcg total) by mouth daily BRAND NAME ONLY- TSH 51, worse since on generic Levothyroxine, Disp: 90 tablet, Rfl: 1    predniSONE 20 mg tablet, Take 2 tablets (40 mg total) by mouth daily for 5 days, Disp: 10 tablet, Rfl: 0    Current Allergies     Allergies as of 02/12/2020 - Reviewed 02/12/2020   Allergen Reaction Noted    Other  08/15/2018    Vancomycin  08/15/2018            The following portions of the patient's history were reviewed and updated as appropriate: allergies, current medications, past family history, past medical history, past social history, past surgical history and problem list      Past Medical History:   Diagnosis Date    Acute sinus infection     Anxiety     Asthma     Depression     Disease of thyroid gland     GERD (gastroesophageal reflux disease)     Heel spur     right    Hypertension     MRSA (methicillin resistant Staphylococcus aureus)     right leg & right leg inner thigh    Neck pain     Obesity     Sleep disturbance        Past Surgical History:   Procedure Laterality Date    BREAST BIOPSY Right     benign     SECTION      x1 live birth   [place]  SECTION      LEG SURGERY Right 2015    removal of MRSA    THYROIDECTOMY  2015       Family History   Problem Relation Age of Onset    Cervical cancer Mother     Hypertension Mother     Hypertension Father     Heart disease Maternal Uncle     Breast cancer Paternal Grandmother     No Known Problems Sister     No Known Problems Daughter     No Known Problems Maternal Grandmother     No Known Problems Maternal Grandfather     No Known Problems Paternal Grandfather     No Known Problems Sister     No Known Problems Daughter     No Known Problems Paternal Aunt     No Known Problems Paternal Aunt     No Known Problems Paternal Aunt     No Known Problems Paternal Aunt     No Known Problems Paternal Aunt     No Known Problems Paternal Aunt     No Known Problems Paternal Aunt          Medications have been verified  Objective   /74   Pulse 91   Temp 98 5 °F (36 9 °C) (Oral)   Resp 18   Ht 5' 7" (1 702 m)   Wt 122 kg (269 lb)   LMP 2020 (Exact Date)   SpO2 99%   BMI 42 13 kg/m²        Physical Exam     Physical Exam   Constitutional: She is oriented to person, place, and time  She appears well-developed and well-nourished  She is cooperative  She does not appear ill  No distress     HENT:   Right Ear: Hearing and tympanic membrane normal    Left Ear: Hearing and tympanic membrane normal    Nose: Rhinorrhea present  Right sinus exhibits no maxillary sinus tenderness and no frontal sinus tenderness  Left sinus exhibits no maxillary sinus tenderness and no frontal sinus tenderness  Mouth/Throat: No oropharyngeal exudate or posterior oropharyngeal erythema  Eyes: Lids are normal    Cardiovascular: Normal rate, regular rhythm, S1 normal, S2 normal, normal heart sounds and intact distal pulses  Exam reveals no gallop and no friction rub  No murmur heard  Pulmonary/Chest: Effort normal and breath sounds normal  No respiratory distress  She has no decreased breath sounds  She has no wheezes  Abdominal: Normal appearance  Musculoskeletal: Normal range of motion  She exhibits no edema, tenderness or deformity  Neurological: She is alert and oriented to person, place, and time  Skin: Skin is warm  No rash noted  No erythema  Psychiatric: She has a normal mood and affect  Her behavior is normal  Thought content normal    Nursing note and vitals reviewed

## 2020-05-01 ENCOUNTER — TELEMEDICINE (OUTPATIENT)
Dept: FAMILY MEDICINE CLINIC | Facility: CLINIC | Age: 35
End: 2020-05-01
Payer: COMMERCIAL

## 2020-05-01 VITALS — RESPIRATION RATE: 20 BRPM

## 2020-05-01 DIAGNOSIS — E03.9 ACQUIRED HYPOTHYROIDISM: ICD-10-CM

## 2020-05-01 DIAGNOSIS — F41.9 ANXIETY: ICD-10-CM

## 2020-05-01 DIAGNOSIS — J30.9 ALLERGIC RHINITIS WITH POSTNASAL DRIP: ICD-10-CM

## 2020-05-01 DIAGNOSIS — I10 HYPERTENSION, UNSPECIFIED TYPE: ICD-10-CM

## 2020-05-01 DIAGNOSIS — Z13.220 SCREENING CHOLESTEROL LEVEL: ICD-10-CM

## 2020-05-01 DIAGNOSIS — R09.82 ALLERGIC RHINITIS WITH POSTNASAL DRIP: ICD-10-CM

## 2020-05-01 DIAGNOSIS — J30.1 SEASONAL ALLERGIC RHINITIS DUE TO POLLEN: Primary | ICD-10-CM

## 2020-05-01 PROCEDURE — 99214 OFFICE O/P EST MOD 30 MIN: CPT | Performed by: NURSE PRACTITIONER

## 2020-05-01 RX ORDER — LEVOCETIRIZINE DIHYDROCHLORIDE 5 MG/1
5 TABLET, FILM COATED ORAL EVERY EVENING
Qty: 90 TABLET | Refills: 3 | Status: SHIPPED | OUTPATIENT
Start: 2020-05-01 | End: 2020-08-11 | Stop reason: SDUPTHER

## 2020-05-01 RX ORDER — FLUTICASONE PROPIONATE 50 MCG
2 SPRAY, SUSPENSION (ML) NASAL DAILY
Qty: 32 G | Refills: 11 | Status: SHIPPED | OUTPATIENT
Start: 2020-05-01 | End: 2020-08-11 | Stop reason: SDUPTHER

## 2020-05-01 RX ORDER — ESCITALOPRAM OXALATE 5 MG/1
5 TABLET ORAL DAILY
Qty: 90 TABLET | Refills: 3 | Status: SHIPPED | OUTPATIENT
Start: 2020-05-01 | End: 2020-05-18 | Stop reason: SDUPTHER

## 2020-05-01 RX ORDER — LISINOPRIL 20 MG/1
20 TABLET ORAL DAILY
Qty: 90 TABLET | Refills: 1 | Status: SHIPPED | OUTPATIENT
Start: 2020-05-01 | End: 2020-05-18 | Stop reason: SDUPTHER

## 2020-05-15 ENCOUNTER — APPOINTMENT (OUTPATIENT)
Dept: LAB | Facility: CLINIC | Age: 35
End: 2020-05-15
Payer: COMMERCIAL

## 2020-05-15 DIAGNOSIS — Z13.220 SCREENING CHOLESTEROL LEVEL: ICD-10-CM

## 2020-05-15 DIAGNOSIS — E03.9 ACQUIRED HYPOTHYROIDISM: ICD-10-CM

## 2020-05-15 LAB
ALBUMIN SERPL BCP-MCNC: 3.7 G/DL (ref 3.5–5)
ALP SERPL-CCNC: 83 U/L (ref 46–116)
ALT SERPL W P-5'-P-CCNC: 20 U/L (ref 12–78)
ANION GAP SERPL CALCULATED.3IONS-SCNC: 5 MMOL/L (ref 4–13)
AST SERPL W P-5'-P-CCNC: 11 U/L (ref 5–45)
BILIRUB SERPL-MCNC: 0.4 MG/DL (ref 0.2–1)
BUN SERPL-MCNC: 10 MG/DL (ref 5–25)
CALCIUM SERPL-MCNC: 8.8 MG/DL (ref 8.3–10.1)
CHLORIDE SERPL-SCNC: 105 MMOL/L (ref 100–108)
CHOLEST SERPL-MCNC: 192 MG/DL (ref 50–200)
CO2 SERPL-SCNC: 28 MMOL/L (ref 21–32)
CREAT SERPL-MCNC: 0.75 MG/DL (ref 0.6–1.3)
GFR SERPL CREATININE-BSD FRML MDRD: 102 ML/MIN/1.73SQ M
GLUCOSE P FAST SERPL-MCNC: 105 MG/DL (ref 65–99)
HDLC SERPL-MCNC: 40 MG/DL
LDLC SERPL CALC-MCNC: 135 MG/DL (ref 0–100)
NONHDLC SERPL-MCNC: 152 MG/DL
POTASSIUM SERPL-SCNC: 4 MMOL/L (ref 3.5–5.3)
PROT SERPL-MCNC: 7.9 G/DL (ref 6.4–8.2)
SODIUM SERPL-SCNC: 138 MMOL/L (ref 136–145)
T4 FREE SERPL-MCNC: 0.71 NG/DL (ref 0.76–1.46)
TRIGL SERPL-MCNC: 87 MG/DL
TSH SERPL DL<=0.05 MIU/L-ACNC: 25.8 UIU/ML (ref 0.36–3.74)

## 2020-05-15 PROCEDURE — 80053 COMPREHEN METABOLIC PANEL: CPT

## 2020-05-15 PROCEDURE — 84439 ASSAY OF FREE THYROXINE: CPT

## 2020-05-15 PROCEDURE — 84443 ASSAY THYROID STIM HORMONE: CPT

## 2020-05-15 PROCEDURE — 80061 LIPID PANEL: CPT

## 2020-05-15 PROCEDURE — 36415 COLL VENOUS BLD VENIPUNCTURE: CPT

## 2020-05-18 ENCOUNTER — OFFICE VISIT (OUTPATIENT)
Dept: FAMILY MEDICINE CLINIC | Facility: CLINIC | Age: 35
End: 2020-05-18
Payer: COMMERCIAL

## 2020-05-18 VITALS
TEMPERATURE: 97.2 F | HEIGHT: 67 IN | SYSTOLIC BLOOD PRESSURE: 134 MMHG | WEIGHT: 263 LBS | HEART RATE: 73 BPM | RESPIRATION RATE: 18 BRPM | BODY MASS INDEX: 41.28 KG/M2 | OXYGEN SATURATION: 98 % | DIASTOLIC BLOOD PRESSURE: 76 MMHG

## 2020-05-18 DIAGNOSIS — E89.0 H/O TOTAL THYROIDECTOMY: ICD-10-CM

## 2020-05-18 DIAGNOSIS — Z00.00 ANNUAL PHYSICAL EXAM: Primary | ICD-10-CM

## 2020-05-18 DIAGNOSIS — I10 HYPERTENSION, UNSPECIFIED TYPE: ICD-10-CM

## 2020-05-18 DIAGNOSIS — K21.00 GASTROESOPHAGEAL REFLUX DISEASE WITH ESOPHAGITIS: ICD-10-CM

## 2020-05-18 DIAGNOSIS — E66.01 CLASS 3 SEVERE OBESITY DUE TO EXCESS CALORIES WITH SERIOUS COMORBIDITY AND BODY MASS INDEX (BMI) OF 40.0 TO 44.9 IN ADULT (HCC): ICD-10-CM

## 2020-05-18 DIAGNOSIS — F41.9 ANXIETY: ICD-10-CM

## 2020-05-18 DIAGNOSIS — E03.9 HYPOTHYROIDISM, UNSPECIFIED TYPE: ICD-10-CM

## 2020-05-18 DIAGNOSIS — K90.0 CELIAC DISEASE: ICD-10-CM

## 2020-05-18 PROBLEM — Z90.89 H/O TOTAL THYROIDECTOMY: Status: ACTIVE | Noted: 2020-05-18

## 2020-05-18 PROBLEM — Z98.890 H/O TOTAL THYROIDECTOMY: Status: ACTIVE | Noted: 2020-05-18

## 2020-05-18 PROCEDURE — 3078F DIAST BP <80 MM HG: CPT | Performed by: NURSE PRACTITIONER

## 2020-05-18 PROCEDURE — 99395 PREV VISIT EST AGE 18-39: CPT | Performed by: NURSE PRACTITIONER

## 2020-05-18 PROCEDURE — 3075F SYST BP GE 130 - 139MM HG: CPT | Performed by: NURSE PRACTITIONER

## 2020-05-18 PROCEDURE — 99214 OFFICE O/P EST MOD 30 MIN: CPT | Performed by: NURSE PRACTITIONER

## 2020-05-18 PROCEDURE — 3008F BODY MASS INDEX DOCD: CPT | Performed by: NURSE PRACTITIONER

## 2020-05-18 PROCEDURE — 1036F TOBACCO NON-USER: CPT | Performed by: NURSE PRACTITIONER

## 2020-05-18 RX ORDER — LEVOTHYROXINE SODIUM 50 MCG
50 TABLET ORAL DAILY
Qty: 90 TABLET | Refills: 1 | Status: SHIPPED | OUTPATIENT
Start: 2020-05-18 | End: 2020-08-11

## 2020-05-18 RX ORDER — LEVOTHYROXINE SODIUM 200 MCG
200 TABLET ORAL DAILY
Qty: 90 TABLET | Refills: 1 | Status: SHIPPED | OUTPATIENT
Start: 2020-05-18 | End: 2020-08-11 | Stop reason: SDUPTHER

## 2020-05-18 RX ORDER — LISINOPRIL 20 MG/1
20 TABLET ORAL DAILY
Qty: 90 TABLET | Refills: 1 | Status: SHIPPED | OUTPATIENT
Start: 2020-05-18 | End: 2020-08-11 | Stop reason: SDUPTHER

## 2020-05-18 RX ORDER — ESCITALOPRAM OXALATE 10 MG/1
10 TABLET ORAL DAILY
Qty: 90 TABLET | Refills: 1 | Status: SHIPPED | OUTPATIENT
Start: 2020-05-18 | End: 2020-08-11 | Stop reason: SDUPTHER

## 2020-05-19 ENCOUNTER — TELEPHONE (OUTPATIENT)
Dept: FAMILY MEDICINE CLINIC | Facility: CLINIC | Age: 35
End: 2020-05-19

## 2020-07-13 ENCOUNTER — APPOINTMENT (OUTPATIENT)
Dept: LAB | Facility: CLINIC | Age: 35
End: 2020-07-13
Payer: COMMERCIAL

## 2020-07-13 DIAGNOSIS — E03.9 HYPOTHYROIDISM, UNSPECIFIED TYPE: ICD-10-CM

## 2020-07-13 LAB
T4 FREE SERPL-MCNC: 1.67 NG/DL (ref 0.76–1.46)
TSH SERPL DL<=0.05 MIU/L-ACNC: 0.02 UIU/ML (ref 0.36–3.74)

## 2020-07-13 PROCEDURE — 84439 ASSAY OF FREE THYROXINE: CPT

## 2020-07-13 PROCEDURE — 36415 COLL VENOUS BLD VENIPUNCTURE: CPT

## 2020-07-13 PROCEDURE — 84443 ASSAY THYROID STIM HORMONE: CPT

## 2020-08-11 ENCOUNTER — OFFICE VISIT (OUTPATIENT)
Dept: FAMILY MEDICINE CLINIC | Facility: CLINIC | Age: 35
End: 2020-08-11
Payer: COMMERCIAL

## 2020-08-11 VITALS
OXYGEN SATURATION: 98 % | HEART RATE: 84 BPM | SYSTOLIC BLOOD PRESSURE: 128 MMHG | WEIGHT: 267 LBS | HEIGHT: 67 IN | TEMPERATURE: 97.8 F | RESPIRATION RATE: 18 BRPM | DIASTOLIC BLOOD PRESSURE: 86 MMHG | BODY MASS INDEX: 41.91 KG/M2

## 2020-08-11 DIAGNOSIS — I10 HYPERTENSION, UNSPECIFIED TYPE: ICD-10-CM

## 2020-08-11 DIAGNOSIS — F41.9 ANXIETY: ICD-10-CM

## 2020-08-11 DIAGNOSIS — J30.1 SEASONAL ALLERGIC RHINITIS DUE TO POLLEN: ICD-10-CM

## 2020-08-11 DIAGNOSIS — E03.9 ACQUIRED HYPOTHYROIDISM: Primary | ICD-10-CM

## 2020-08-11 DIAGNOSIS — I10 ESSENTIAL HYPERTENSION: ICD-10-CM

## 2020-08-11 DIAGNOSIS — R09.82 ALLERGIC RHINITIS WITH POSTNASAL DRIP: ICD-10-CM

## 2020-08-11 DIAGNOSIS — E78.00 HYPERCHOLESTEROLEMIA: ICD-10-CM

## 2020-08-11 DIAGNOSIS — Z13.1 SCREENING FOR DIABETES MELLITUS: ICD-10-CM

## 2020-08-11 DIAGNOSIS — J32.9 CHRONIC SINUSITIS, UNSPECIFIED LOCATION: ICD-10-CM

## 2020-08-11 DIAGNOSIS — J30.9 ALLERGIC RHINITIS WITH POSTNASAL DRIP: ICD-10-CM

## 2020-08-11 DIAGNOSIS — E03.9 HYPOTHYROIDISM, UNSPECIFIED TYPE: ICD-10-CM

## 2020-08-11 PROCEDURE — 3079F DIAST BP 80-89 MM HG: CPT | Performed by: NURSE PRACTITIONER

## 2020-08-11 PROCEDURE — 3008F BODY MASS INDEX DOCD: CPT | Performed by: NURSE PRACTITIONER

## 2020-08-11 PROCEDURE — 99214 OFFICE O/P EST MOD 30 MIN: CPT | Performed by: NURSE PRACTITIONER

## 2020-08-11 PROCEDURE — 3725F SCREEN DEPRESSION PERFORMED: CPT | Performed by: NURSE PRACTITIONER

## 2020-08-11 PROCEDURE — 3074F SYST BP LT 130 MM HG: CPT | Performed by: NURSE PRACTITIONER

## 2020-08-11 PROCEDURE — 1036F TOBACCO NON-USER: CPT | Performed by: NURSE PRACTITIONER

## 2020-08-11 RX ORDER — FLUTICASONE PROPIONATE 50 MCG
2 SPRAY, SUSPENSION (ML) NASAL DAILY
Qty: 3 BOTTLE | Refills: 3 | Status: SHIPPED | OUTPATIENT
Start: 2020-08-11 | End: 2021-12-16 | Stop reason: SDUPTHER

## 2020-08-11 RX ORDER — ESCITALOPRAM OXALATE 10 MG/1
10 TABLET ORAL DAILY
Qty: 90 TABLET | Refills: 1 | Status: SHIPPED | OUTPATIENT
Start: 2020-08-11 | End: 2021-01-09 | Stop reason: SDUPTHER

## 2020-08-11 RX ORDER — LEVOTHYROXINE SODIUM 25 MCG
25 TABLET ORAL DAILY
Qty: 90 TABLET | Refills: 3 | Status: SHIPPED | OUTPATIENT
Start: 2020-08-11 | End: 2020-08-12 | Stop reason: SDUPTHER

## 2020-08-11 RX ORDER — LISINOPRIL 20 MG/1
20 TABLET ORAL DAILY
Qty: 90 TABLET | Refills: 1 | Status: SHIPPED | OUTPATIENT
Start: 2020-08-11 | End: 2021-04-12

## 2020-08-11 RX ORDER — LEVOCETIRIZINE DIHYDROCHLORIDE 5 MG/1
5 TABLET, FILM COATED ORAL EVERY EVENING
Qty: 90 TABLET | Refills: 3 | Status: SHIPPED | OUTPATIENT
Start: 2020-08-11 | End: 2021-12-16 | Stop reason: SDUPTHER

## 2020-08-11 RX ORDER — LEVOTHYROXINE SODIUM 200 MCG
200 TABLET ORAL DAILY
Qty: 90 TABLET | Refills: 1 | Status: SHIPPED | OUTPATIENT
Start: 2020-08-11 | End: 2020-08-12 | Stop reason: SDUPTHER

## 2020-08-11 NOTE — PROGRESS NOTES
[facility]        NAME: Brianna Rojo is a 38 y o  female  : 1985    MRN: [id number]  DATE: 2020  TIME: 11:19 AM    Assessment and Plan   Acquired hypothyroidism [E03 9]  1  Acquired hypothyroidism  TSH, 3rd generation with Free T4 reflex    Comprehensive metabolic panel    396HMO daily ( cut 50mcg tablet in )   2  Screening for diabetes mellitus  HEMOGLOBIN A1C W/ EAG ESTIMATION   3  Anxiety  escitalopram (LEXAPRO) 10 mg tablet   4  Essential hypertension     5  Chronic sinusitis, unspecified location  Ambulatory Referral to Otolaryngology   6  Hypercholesterolemia  Lipid panel   7  Allergic rhinitis with postnasal drip  fluticasone (FLONASE) 50 mcg/act nasal spray   8  Seasonal allergic rhinitis due to pollen  levocetirizine (XYZAL) 5 MG tablet   9  Hypertension, unspecified type  lisinopril (ZESTRIL) 20 mg tablet   10  Hypothyroidism, unspecified type  Synthroid 200 MCG tablet    Synthroid 25 MCG tablet         Patient Instructions     Patient Instructions   Decrease Synthroid to 225mcg daily, recheck blood work in 6-8 weeks  Continue other medications  Follow up with Dr Yen Perez for recommended tonsillectomy  3 month follow up or sooner if needed          Chief Complaint     Chief Complaint   Patient presents with    Follow-up     Med check  Pt c/o L ear "blockage" - on going  History of Present Illness       Here for follow up labs and medication- TSH is very low, patient is symptomatic with racing heart and anxiety- would like to decrease Synthroid dose to 225mcg daily  She would like to keep Lexapro at 10mg daily  Was advised to get her tonsils and adenoids out- was very anxious about this but realizes it must be done- is willing to follow up with Dr Yen Perez  Is planning to f/u with Dr Ysabel Brandt for follow up mammograms      Review of Systems   Review of Systems   Constitutional: Positive for fatigue  Negative for activity change, chills and fever     HENT: Positive for congestion, ear pain (not pain, but fullness), postnasal drip, rhinorrhea, sinus pressure, trouble swallowing and voice change (chronic)  Negative for sore throat  Eyes: Negative for pain, discharge and redness  Respiratory: Positive for cough  Negative for wheezing  Cardiovascular: Positive for palpitations  Negative for chest pain  Gastrointestinal: Negative for constipation, diarrhea, nausea and vomiting  Musculoskeletal: Negative for myalgias  Skin: Negative for rash  Neurological: Positive for headaches  Negative for dizziness  Psychiatric/Behavioral: The patient is nervous/anxious            Current Medications       Current Outpatient Medications:     escitalopram (LEXAPRO) 10 mg tablet, Take 1 tablet (10 mg total) by mouth daily, Disp: 90 tablet, Rfl: 1    fluticasone (FLONASE) 50 mcg/act nasal spray, 2 sprays into each nostril daily, Disp: 3 Bottle, Rfl: 3    levocetirizine (XYZAL) 5 MG tablet, Take 1 tablet (5 mg total) by mouth every evening, Disp: 90 tablet, Rfl: 3    lisinopril (ZESTRIL) 20 mg tablet, Take 1 tablet (20 mg total) by mouth daily, Disp: 90 tablet, Rfl: 1    Synthroid 200 MCG tablet, Take 1 tablet (200 mcg total) by mouth daily BRAND NAME ONLY SYNTHROID- add to 50mcg for total of 250mcg daily, Disp: 90 tablet, Rfl: 1    Synthroid 25 MCG tablet, Take 1 tablet (25 mcg total) by mouth daily BRAND NAME ONLY- total dose 225mcg, Disp: 90 tablet, Rfl: 3    Current Allergies     Allergies as of 08/11/2020 - Reviewed 08/11/2020   Allergen Reaction Noted    Other  08/15/2018    Vancomycin  08/15/2018            The following portions of the patient's history were reviewed and updated as appropriate: allergies, current medications, past family history, past medical history, past social history, past surgical history and problem list      Past Medical History:   Diagnosis Date    Acute sinus infection     Anxiety     Asthma     Depression     Disease of thyroid gland     GERD (gastroesophageal reflux disease)     Heel spur     right    Hypertension     MRSA (methicillin resistant Staphylococcus aureus)     right leg & right leg inner thigh    Neck pain     Obesity     Sleep disturbance        Past Surgical History:   Procedure Laterality Date    BREAST BIOPSY Right     benign     SECTION      x1 live birth   [employer]  SECTION      LEG SURGERY Right 2015    removal of MRSA    THYROIDECTOMY  2015       Family History   Problem Relation Age of Onset    Cervical cancer Mother     Hypertension Mother     Hypertension Father     Heart disease Maternal Uncle     Breast cancer Paternal Grandmother     No Known Problems Sister     No Known Problems Daughter     No Known Problems Maternal Grandmother     No Known Problems Maternal Grandfather     No Known Problems Paternal Grandfather     No Known Problems Sister     No Known Problems Daughter     No Known Problems Paternal Aunt     No Known Problems Paternal Aunt     No Known Problems Paternal Aunt     No Known Problems Paternal Aunt     No Known Problems Paternal Aunt     No Known Problems Paternal Aunt     No Known Problems Paternal Aunt          Medications have been verified  Objective   /86   Pulse 84   Temp 97 8 °F (36 6 °C) (Temporal)   Resp 18   Ht 5' 7" (1 702 m)   Wt 121 kg (267 lb)   SpO2 98%   BMI 41 82 kg/m²        Physical Exam     Physical Exam  Vitals signs and nursing note reviewed  Constitutional:       General: She is not in acute distress  Appearance: She is well-developed  She is not diaphoretic  HENT:      Head: Normocephalic and atraumatic  Right Ear: Ear canal and external ear normal  A middle ear effusion is present  Tympanic membrane is not perforated or erythematous  Left Ear: Ear canal and external ear normal  A middle ear effusion is present  Tympanic membrane is not perforated or erythematous        Nose: Congestion and rhinorrhea present  Mouth/Throat:      Pharynx: Uvula midline  Cardiovascular:      Rate and Rhythm: Normal rate and regular rhythm  Heart sounds: Normal heart sounds  No murmur  No friction rub  No gallop  Pulmonary:      Effort: Pulmonary effort is normal  No respiratory distress  Breath sounds: Normal breath sounds  No wheezing or rales  Abdominal:      General: There is no distension  Musculoskeletal: Normal range of motion  Neurological:      Mental Status: She is alert and oriented to person, place, and time  Psychiatric:         Behavior: Behavior normal          Thought Content:  Thought content normal          Judgment: Judgment normal

## 2020-08-11 NOTE — PATIENT INSTRUCTIONS
Decrease Synthroid to 225mcg daily, recheck blood work in 6-8 weeks  Continue other medications  Follow up with Dr Sylvie Ding for recommended tonsillectomy  3 month follow up or sooner if needed

## 2020-08-12 DIAGNOSIS — E03.9 HYPOTHYROIDISM, UNSPECIFIED TYPE: ICD-10-CM

## 2020-08-12 RX ORDER — LEVOTHYROXINE SODIUM 25 MCG
25 TABLET ORAL DAILY
Qty: 90 TABLET | Refills: 3 | Status: SHIPPED | OUTPATIENT
Start: 2020-08-12 | End: 2020-12-07

## 2020-08-12 RX ORDER — LEVOTHYROXINE SODIUM 200 MCG
200 TABLET ORAL DAILY
Qty: 90 TABLET | Refills: 1 | Status: SHIPPED | OUTPATIENT
Start: 2020-08-12 | End: 2021-08-23

## 2020-08-13 ENCOUNTER — TELEPHONE (OUTPATIENT)
Dept: FAMILY MEDICINE CLINIC | Facility: CLINIC | Age: 35
End: 2020-08-13

## 2020-08-13 NOTE — TELEPHONE ENCOUNTER
Pharmacy called needing clarification on amount and quanity of patients Synthroid, spoke with provider and called [facility] Patient takes 200 mcg and 25mcg each daily by mouth  Kim Mao #90 day supply on each with 3 refills   Called spoke with Pharmacist Chauncey and everything is taken care of

## 2020-08-22 ENCOUNTER — OFFICE VISIT (OUTPATIENT)
Dept: URGENT CARE | Facility: CLINIC | Age: 35
End: 2020-08-22
Payer: COMMERCIAL

## 2020-08-22 VITALS
RESPIRATION RATE: 18 BRPM | DIASTOLIC BLOOD PRESSURE: 90 MMHG | SYSTOLIC BLOOD PRESSURE: 147 MMHG | TEMPERATURE: 97.8 F | BODY MASS INDEX: 40.92 KG/M2 | HEIGHT: 68 IN | WEIGHT: 270 LBS | HEART RATE: 71 BPM | OXYGEN SATURATION: 99 %

## 2020-08-22 DIAGNOSIS — K04.7 DENTAL ABSCESS: Primary | ICD-10-CM

## 2020-08-22 PROCEDURE — 99213 OFFICE O/P EST LOW 20 MIN: CPT | Performed by: FAMILY MEDICINE

## 2020-08-22 RX ORDER — AMOXICILLIN AND CLAVULANATE POTASSIUM 875; 125 MG/1; MG/1
1 TABLET, FILM COATED ORAL EVERY 12 HOURS SCHEDULED
Qty: 20 TABLET | Refills: 0 | Status: SHIPPED | OUTPATIENT
Start: 2020-08-22 | End: 2020-09-01

## 2020-08-22 NOTE — PATIENT INSTRUCTIONS
Dental abscess  Treat with antibiotic Augmentin 875 mg-1 tablet by mouth twice daily for 7-10 days  Take probiotic supplements in between the antibiotic doses  Follow-up with a dentist   Tylenol as needed for pain  Follow-up if symptoms persist or worsen despite treatment

## 2020-08-22 NOTE — PROGRESS NOTES
[facility]        NAME: Lee Wade is a 38 y o  female  : 1985    MRN: [id number]  DATE: 2020  TIME: 10:33 AM    Assessment and Plan   Dental abscess [K04 7]  1  Dental abscess  amoxicillin-clavulanate (AUGMENTIN) 875-125 mg per tablet         Patient Instructions   Dental abscess  Treat with antibiotic Augmentin 875 mg-1 tablet by mouth twice daily for 7-10 days  Take probiotic supplements in between the antibiotic doses  Follow-up with a dentist   Tylenol as needed for pain  Follow-up if symptoms persist or worsen despite treatment  Follow up with PCP in 3-5 days  Proceed to  ER if symptoms worsen  Chief Complaint     Chief Complaint   Patient presents with    Dental Pain     Face swollen Left side upper gum pain  started 3 days ago         History of Present Illness       Patient presents with 3 day history of left upper molar and gum pain with cheek swelling  No fevers or chills  Her dentist appointment has been delayed due to [place]  No other significant complaints  Review of Systems   Review of Systems   Constitutional: Negative for chills, fatigue and fever  HENT: Positive for dental problem            Current Medications       Current Outpatient Medications:     escitalopram (LEXAPRO) 10 mg tablet, Take 1 tablet (10 mg total) by mouth daily, Disp: 90 tablet, Rfl: 1    fluticasone (FLONASE) 50 mcg/act nasal spray, 2 sprays into each nostril daily, Disp: 3 Bottle, Rfl: 3    levocetirizine (XYZAL) 5 MG tablet, Take 1 tablet (5 mg total) by mouth every evening, Disp: 90 tablet, Rfl: 3    lisinopril (ZESTRIL) 20 mg tablet, Take 1 tablet (20 mg total) by mouth daily, Disp: 90 tablet, Rfl: 1    Synthroid 200 MCG tablet, Take 1 tablet (200 mcg total) by mouth daily BRAND NAME ONLY SYNTHROID- add to 50mcg for total of 250mcg daily, Disp: 90 tablet, Rfl: 1    Synthroid 25 MCG tablet, Take 1 tablet (25 mcg total) by mouth daily BRAND NAME ONLY- total dose 225mcg, Disp: 90 tablet, Rfl: 3    amoxicillin-clavulanate (AUGMENTIN) 875-125 mg per tablet, Take 1 tablet by mouth every 12 (twelve) hours for 10 days, Disp: 20 tablet, Rfl: 0    Current Allergies     Allergies as of 2020 - Reviewed 2020   Allergen Reaction Noted    Other  08/15/2018    Vancomycin  08/15/2018            The following portions of the patient's history were reviewed and updated as appropriate: allergies, current medications, past family history, past medical history, past social history, past surgical history and problem list      Past Medical History:   Diagnosis Date    Acute sinus infection     Anxiety     Asthma     Depression     Disease of thyroid gland     GERD (gastroesophageal reflux disease)     Heel spur     right    Hypertension     MRSA (methicillin resistant Staphylococcus aureus)     right leg & right leg inner thigh    Neck pain     Obesity     Sleep disturbance        Past Surgical History:   Procedure Laterality Date    BREAST BIOPSY Right     benign     SECTION      x1 live birth   [place]  SECTION      LEG SURGERY Right 2015    removal of MRSA    THYROIDECTOMY  2015       Family History   Problem Relation Age of Onset    Cervical cancer Mother     Hypertension Mother     Hypertension Father     Heart disease Maternal Uncle     Breast cancer Paternal Grandmother     No Known Problems Sister     No Known Problems Daughter     No Known Problems Maternal Grandmother     No Known Problems Maternal Grandfather     No Known Problems Paternal Grandfather     No Known Problems Sister     No Known Problems Daughter     No Known Problems Paternal Aunt     No Known Problems Paternal Aunt     No Known Problems Paternal Aunt     No Known Problems Paternal Aunt     No Known Problems Paternal Aunt     No Known Problems Paternal Aunt     No Known Problems Paternal Aunt          Medications have been verified          Objective   /90 Pulse 71   Temp 97 8 °F (36 6 °C)   Resp 18   Ht 5' 8" (1 727 m)   Wt 122 kg (270 lb)   SpO2 99%   BMI 41 05 kg/m²        Physical Exam     Physical Exam  Constitutional:       Appearance: Normal appearance  She is not ill-appearing or diaphoretic  HENT:      Mouth/Throat:      Pharynx: No oropharyngeal exudate  Comments: Dental caries noted  Left upper teeth are eroded with protruding root/base of the teeth with dental caries  There is mild swelling of the gum no significant erythema  No drainage  There is tenderness in that region  There is overlying mild swelling and tenderness of the cheek  No pharyngeal exudate or significant erythema  Pulmonary:      Effort: Pulmonary effort is normal    Neurological:      Mental Status: She is alert

## 2020-11-02 ENCOUNTER — IMMUNIZATIONS (OUTPATIENT)
Dept: FAMILY MEDICINE CLINIC | Facility: CLINIC | Age: 35
End: 2020-11-02
Payer: COMMERCIAL

## 2020-11-02 DIAGNOSIS — Z23 ENCOUNTER FOR IMMUNIZATION: ICD-10-CM

## 2020-11-02 PROCEDURE — 90686 IIV4 VACC NO PRSV 0.5 ML IM: CPT

## 2020-11-02 PROCEDURE — 90471 IMMUNIZATION ADMIN: CPT

## 2020-12-04 ENCOUNTER — LAB (OUTPATIENT)
Dept: LAB | Facility: CLINIC | Age: 35
End: 2020-12-04
Payer: COMMERCIAL

## 2020-12-04 DIAGNOSIS — E03.9 ACQUIRED HYPOTHYROIDISM: ICD-10-CM

## 2020-12-04 DIAGNOSIS — E78.00 HYPERCHOLESTEROLEMIA: ICD-10-CM

## 2020-12-04 DIAGNOSIS — Z13.1 SCREENING FOR DIABETES MELLITUS: ICD-10-CM

## 2020-12-04 LAB
ALBUMIN SERPL BCP-MCNC: 3.4 G/DL (ref 3.5–5)
ALP SERPL-CCNC: 88 U/L (ref 46–116)
ALT SERPL W P-5'-P-CCNC: 17 U/L (ref 12–78)
ANION GAP SERPL CALCULATED.3IONS-SCNC: 5 MMOL/L (ref 4–13)
AST SERPL W P-5'-P-CCNC: 9 U/L (ref 5–45)
BILIRUB SERPL-MCNC: 0.37 MG/DL (ref 0.2–1)
BUN SERPL-MCNC: 12 MG/DL (ref 5–25)
CALCIUM ALBUM COR SERPL-MCNC: 9.5 MG/DL (ref 8.3–10.1)
CALCIUM SERPL-MCNC: 9 MG/DL (ref 8.3–10.1)
CHLORIDE SERPL-SCNC: 107 MMOL/L (ref 100–108)
CHOLEST SERPL-MCNC: 189 MG/DL (ref 50–200)
CO2 SERPL-SCNC: 28 MMOL/L (ref 21–32)
CREAT SERPL-MCNC: 0.64 MG/DL (ref 0.6–1.3)
EST. AVERAGE GLUCOSE BLD GHB EST-MCNC: 117 MG/DL
GFR SERPL CREATININE-BSD FRML MDRD: 114 ML/MIN/1.73SQ M
GLUCOSE P FAST SERPL-MCNC: 109 MG/DL (ref 65–99)
HBA1C MFR BLD: 5.7 %
HDLC SERPL-MCNC: 40 MG/DL
LDLC SERPL CALC-MCNC: 120 MG/DL (ref 0–100)
NONHDLC SERPL-MCNC: 149 MG/DL
POTASSIUM SERPL-SCNC: 4.4 MMOL/L (ref 3.5–5.3)
PROT SERPL-MCNC: 7.6 G/DL (ref 6.4–8.2)
SODIUM SERPL-SCNC: 140 MMOL/L (ref 136–145)
T4 FREE SERPL-MCNC: 1.67 NG/DL (ref 0.76–1.46)
TRIGL SERPL-MCNC: 147 MG/DL
TSH SERPL DL<=0.05 MIU/L-ACNC: <0.007 UIU/ML (ref 0.36–3.74)

## 2020-12-04 PROCEDURE — 36415 COLL VENOUS BLD VENIPUNCTURE: CPT

## 2020-12-04 PROCEDURE — 84443 ASSAY THYROID STIM HORMONE: CPT

## 2020-12-04 PROCEDURE — 80061 LIPID PANEL: CPT

## 2020-12-04 PROCEDURE — 83036 HEMOGLOBIN GLYCOSYLATED A1C: CPT

## 2020-12-04 PROCEDURE — 80053 COMPREHEN METABOLIC PANEL: CPT

## 2020-12-04 PROCEDURE — 84439 ASSAY OF FREE THYROXINE: CPT

## 2020-12-07 ENCOUNTER — OFFICE VISIT (OUTPATIENT)
Dept: FAMILY MEDICINE CLINIC | Facility: CLINIC | Age: 35
End: 2020-12-07
Payer: COMMERCIAL

## 2020-12-07 VITALS
DIASTOLIC BLOOD PRESSURE: 86 MMHG | TEMPERATURE: 98.1 F | SYSTOLIC BLOOD PRESSURE: 124 MMHG | OXYGEN SATURATION: 98 % | RESPIRATION RATE: 20 BRPM | BODY MASS INDEX: 42.44 KG/M2 | HEART RATE: 86 BPM | WEIGHT: 280 LBS | HEIGHT: 68 IN

## 2020-12-07 DIAGNOSIS — J32.0 CHRONIC MAXILLARY SINUSITIS: ICD-10-CM

## 2020-12-07 DIAGNOSIS — F41.9 ANXIETY: ICD-10-CM

## 2020-12-07 DIAGNOSIS — H61.22 IMPACTED CERUMEN OF LEFT EAR: ICD-10-CM

## 2020-12-07 DIAGNOSIS — I10 ESSENTIAL HYPERTENSION: ICD-10-CM

## 2020-12-07 DIAGNOSIS — E03.9 HYPOTHYROIDISM, UNSPECIFIED TYPE: Primary | ICD-10-CM

## 2020-12-07 PROCEDURE — 3725F SCREEN DEPRESSION PERFORMED: CPT | Performed by: NURSE PRACTITIONER

## 2020-12-07 PROCEDURE — 3079F DIAST BP 80-89 MM HG: CPT | Performed by: NURSE PRACTITIONER

## 2020-12-07 PROCEDURE — 1036F TOBACCO NON-USER: CPT | Performed by: NURSE PRACTITIONER

## 2020-12-07 PROCEDURE — 99214 OFFICE O/P EST MOD 30 MIN: CPT | Performed by: NURSE PRACTITIONER

## 2020-12-07 PROCEDURE — 3074F SYST BP LT 130 MM HG: CPT | Performed by: NURSE PRACTITIONER

## 2020-12-07 PROCEDURE — 3008F BODY MASS INDEX DOCD: CPT | Performed by: NURSE PRACTITIONER

## 2021-01-04 ENCOUNTER — TELEPHONE (OUTPATIENT)
Dept: FAMILY MEDICINE CLINIC | Facility: CLINIC | Age: 36
End: 2021-01-04

## 2021-01-04 NOTE — TELEPHONE ENCOUNTER
Patient started with symptoms on Yesterday sore throat and sinus Pressure, no fever or other symptoms, MA to call  [phone]

## 2021-01-05 ENCOUNTER — TELEMEDICINE (OUTPATIENT)
Dept: FAMILY MEDICINE CLINIC | Facility: CLINIC | Age: 36
End: 2021-01-05
Payer: COMMERCIAL

## 2021-01-05 DIAGNOSIS — B34.9 VIRAL ILLNESS: ICD-10-CM

## 2021-01-05 DIAGNOSIS — H69.82 DYSFUNCTION OF LEFT EUSTACHIAN TUBE: Primary | ICD-10-CM

## 2021-01-05 PROCEDURE — 99213 OFFICE O/P EST LOW 20 MIN: CPT | Performed by: NURSE PRACTITIONER

## 2021-01-05 RX ORDER — PREDNISONE 20 MG/1
TABLET ORAL
Qty: 9 TABLET | Refills: 0 | Status: SHIPPED | OUTPATIENT
Start: 2021-01-05 | End: 2021-01-11

## 2021-01-05 NOTE — PROGRESS NOTES
COVID-19 Virtual Visit     Assessment/Plan:    Problem List Items Addressed This Visit     None      Visit Diagnoses     Dysfunction of left eustachian tube    -  Primary    Relevant Medications    predniSONE 20 mg tablet    Viral illness        Relevant Medications    predniSONE 20 mg tablet         Disposition:         2 family members tested negative for COVID, able to quarantine for 10 days, does not currently work at this time  Low suspicion for COVID at this time  Prednisone for eusation tube dysfunction  Allergy medication (xyzall and flonase) continue  I have spent 12 minutes directly with the patient  Greater than 50% of this time was spent in counseling/coordination of care regarding: risks and benefits of treatment options, instructions for management, patient and family education and importance of treatment compliance  Encounter provider RACHELLE Stacy    Provider located at [address] [place] [address] PRIMARY CARE  [address] [place] [id number] [phone]    Recent Visits  Date Type Provider Dept   01/04/21 Telephone [facility]   Showing recent visits within past 7 days and meeting all other requirements     Today's Visits  Date Type Provider Dept   01/05/21 Telemedicine RACHELLE Stacy [facility]   Showing today's visits and meeting all other requirements     Future Appointments  No visits were found meeting these conditions  Showing future appointments within next 150 days and meeting all other requirements      This virtual check-in was done via Mulu and patient was informed that this is a secure, HIPAA-compliant platform  She agrees to proceed  Patient agrees to participate in a virtual check in via telephone or video visit instead of presenting to the office to address urgent/immediate medical needs  Patient is aware this is a billable service      After connecting through Televideo, the patient was identified by name and date of birth  Tray Hamlin was informed that this was a telemedicine visit and that the exam was being conducted confidentially over secure lines  My office door was closed  No one else was in the room  Tray Hamlin acknowledged consent and understanding of privacy and security of the telemedicine visit  I informed the patient that I have reviewed her record in Epic and presented the opportunity for her to ask any questions regarding the visit today  The patient agreed to participate  Subjective:   Tray Hamlin is a 38 y o  female who is concerned about COVID-19  Patient's symptoms include nasal congestion, rhinorrhea, sore throat, cough and headache  Patient denies fever, chills, fatigue, malaise, anosmia, loss of taste, shortness of breath, chest tightness, abdominal pain, nausea, vomiting, diarrhea and myalgias  Exposure:   Contact with a person who is under investigation (PUI) for or who is positive for COVID-19 within the last 14 days?: Yes  Date of exposure: 1/6/2020  Hospitalized recently for fever and/or lower respiratory symptoms?: No      Currently a healthcare worker that is involved in direct patient care?: No      Works in a special setting where the risk of COVID-19 transmission may be high? (this may include long-term care, correctional and FPC facilities; homeless shelters; assisted-living facilities and group homes ): No      Resident in a special setting where the risk of COVID-19 transmission may be high? (this may include long-term care, correctional and FPC facilities; homeless shelters; assisted-living facilities and group homes ): No      Postnasal drip  Has been taking cold and flu Coricidin    Has been using prescription nasal spray/      No results found for: Alberta Gene, SARSCORONAVI, CORONAVIRUSR  Past Medical History:   Diagnosis Date    Acute sinus infection     Anxiety     Asthma     Depression  Disease of thyroid gland     GERD (gastroesophageal reflux disease)     Heel spur     right    Hypertension     MRSA (methicillin resistant Staphylococcus aureus)     right leg & right leg inner thigh    Neck pain     Obesity     Sleep disturbance      Past Surgical History:   Procedure Laterality Date    BREAST BIOPSY Right     benign     SECTION      x1 live birth   Ethelyn [place]  SECTION      LEG SURGERY Right 2015    removal of MRSA    THYROIDECTOMY  2015     Current Outpatient Medications   Medication Sig Dispense Refill    escitalopram (LEXAPRO) 10 mg tablet Take 1 tablet (10 mg total) by mouth daily 90 tablet 1    fluticasone (FLONASE) 50 mcg/act nasal spray 2 sprays into each nostril daily 3 Bottle 3    levocetirizine (XYZAL) 5 MG tablet Take 1 tablet (5 mg total) by mouth every evening 90 tablet 3    lisinopril (ZESTRIL) 20 mg tablet Take 1 tablet (20 mg total) by mouth daily 90 tablet 1    predniSONE 20 mg tablet Take 1 tablet (20 mg total) by mouth 2 (two) times a day with meals for 3 days, THEN 1 tablet (20 mg total) daily for 3 days  9 tablet 0    Synthroid 200 MCG tablet Take 1 tablet (200 mcg total) by mouth daily BRAND NAME ONLY SYNTHROID- add to 50mcg for total of 250mcg daily 90 tablet 1     No current facility-administered medications for this visit  Allergies   Allergen Reactions    Other      seasonal    Vancomycin        Review of Systems   Constitutional: Negative for activity change, appetite change, chills, fatigue and fever  HENT: Positive for congestion, postnasal drip, rhinorrhea, sinus pressure and sore throat  Respiratory: Positive for cough  Negative for chest tightness, shortness of breath and stridor  Gastrointestinal: Negative for abdominal pain, diarrhea, nausea and vomiting  Musculoskeletal: Negative for myalgias  Neurological: Positive for headaches  Negative for dizziness  Objective:     There were no vitals filed for this visit     Physical Exam  Vitals signs and nursing note reviewed  Constitutional:       General: She is not in acute distress  Appearance: She is well-developed  She is not diaphoretic  HENT:      Head: Normocephalic and atraumatic  Pulmonary:      Effort: Pulmonary effort is normal  No tachypnea or respiratory distress  Breath sounds: Normal breath sounds  Neurological:      Mental Status: She is alert and oriented to person, place, and time  Psychiatric:         Speech: Speech normal          Behavior: Behavior normal  Behavior is cooperative  Thought Content: Thought content normal          Judgment: Judgment normal        VIRTUAL VISIT DISCLAIMER    Angelito Kim acknowledges that she has consented to an online visit or consultation  She understands that the online visit is based solely on information provided by her, and that, in the absence of a face-to-face physical evaluation by the physician, the diagnosis she receives is both limited and provisional in terms of accuracy and completeness  This is not intended to replace a full medical face-to-face evaluation by the physician  Angelito Kim understands and accepts these terms

## 2021-01-09 DIAGNOSIS — F41.9 ANXIETY: ICD-10-CM

## 2021-01-09 RX ORDER — ESCITALOPRAM OXALATE 10 MG/1
10 TABLET ORAL DAILY
Qty: 90 TABLET | Refills: 1 | Status: SHIPPED | OUTPATIENT
Start: 2021-01-09 | End: 2021-01-14 | Stop reason: SDUPTHER

## 2021-01-14 DIAGNOSIS — F41.9 ANXIETY: ICD-10-CM

## 2021-01-14 RX ORDER — ESCITALOPRAM OXALATE 10 MG/1
10 TABLET ORAL DAILY
Qty: 90 TABLET | Refills: 3 | Status: SHIPPED | OUTPATIENT
Start: 2021-01-14 | End: 2021-12-16 | Stop reason: SDUPTHER

## 2021-01-14 NOTE — TELEPHONE ENCOUNTER
Patient called to let us know that her rx for lexapro needs to be sent to [facility] as insurance wont cover the medication to be refilled at [facility]

## 2021-03-22 ENCOUNTER — TELEPHONE (OUTPATIENT)
Dept: FAMILY MEDICINE CLINIC | Facility: CLINIC | Age: 36
End: 2021-03-22

## 2021-03-22 DIAGNOSIS — J02.9 SORE THROAT: Primary | ICD-10-CM

## 2021-03-22 DIAGNOSIS — R43.0 LOSS OF SMELL: ICD-10-CM

## 2021-03-22 DIAGNOSIS — Z20.822 EXPOSURE TO COVID-19 VIRUS: ICD-10-CM

## 2021-03-22 DIAGNOSIS — R53.83 OTHER FATIGUE: ICD-10-CM

## 2021-03-22 PROCEDURE — U0005 INFEC AGEN DETEC AMPLI PROBE: HCPCS | Performed by: NURSE PRACTITIONER

## 2021-03-22 PROCEDURE — U0003 INFECTIOUS AGENT DETECTION BY NUCLEIC ACID (DNA OR RNA); SEVERE ACUTE RESPIRATORY SYNDROME CORONAVIRUS 2 (SARS-COV-2) (CORONAVIRUS DISEASE [COVID-19]), AMPLIFIED PROBE TECHNIQUE, MAKING USE OF HIGH THROUGHPUT TECHNOLOGIES AS DESCRIBED BY CMS-2020-01-R: HCPCS | Performed by: NURSE PRACTITIONER

## 2021-03-22 NOTE — TELEPHONE ENCOUNTER
Called and spoke with pt  She states they started 3/17/21 with symptoms after being exposed on 3/14/21 by her in-laws  She states she has a sore throat, loss of smell, and fatigue  I ordered covid testing for pt as she has symptoms and was exposed   She will come to the office for swabbing today before 5, and is aware she needs to remain in car and call the office upon arrival

## 2021-03-22 NOTE — TELEPHONE ENCOUNTER
Was in contact with in laws who tested positive  She has been sick since Wed 3/17, sore throat,sweating today she started with no taste

## 2021-03-23 LAB — SARS-COV-2 RNA RESP QL NAA+PROBE: POSITIVE

## 2021-03-24 ENCOUNTER — TELEPHONE (OUTPATIENT)
Dept: FAMILY MEDICINE CLINIC | Facility: CLINIC | Age: 36
End: 2021-03-24

## 2021-03-24 NOTE — TELEPHONE ENCOUNTER
Pt was asking about her covid results  Since we ordered testing, I advised her that she did test positive for covid  Pt states she only has cough, sore throat, and headache at this time  Advised pt that if her symptoms worsen, to give the office a call and we will gladly schedule her for a virtual visit  Pt verbalized an understanding

## 2021-04-12 DIAGNOSIS — I10 HYPERTENSION, UNSPECIFIED TYPE: ICD-10-CM

## 2021-04-12 RX ORDER — LISINOPRIL 20 MG/1
TABLET ORAL
Qty: 90 TABLET | Refills: 1 | Status: SHIPPED | OUTPATIENT
Start: 2021-04-12 | End: 2021-10-25

## 2021-05-07 ENCOUNTER — OFFICE VISIT (OUTPATIENT)
Dept: URGENT CARE | Facility: CLINIC | Age: 36
End: 2021-05-07
Payer: COMMERCIAL

## 2021-05-07 VITALS
HEART RATE: 76 BPM | SYSTOLIC BLOOD PRESSURE: 139 MMHG | OXYGEN SATURATION: 98 % | TEMPERATURE: 97.8 F | RESPIRATION RATE: 18 BRPM | DIASTOLIC BLOOD PRESSURE: 78 MMHG

## 2021-05-07 DIAGNOSIS — L08.9 LOCAL INFECTION OF THE SKIN AND SUBCUTANEOUS TISSUE, UNSPECIFIED: Primary | ICD-10-CM

## 2021-05-07 PROCEDURE — 90715 TDAP VACCINE 7 YRS/> IM: CPT

## 2021-05-07 PROCEDURE — G0382 LEV 3 HOSP TYPE B ED VISIT: HCPCS | Performed by: PHYSICIAN ASSISTANT

## 2021-05-07 PROCEDURE — 90471 IMMUNIZATION ADMIN: CPT | Performed by: PHYSICIAN ASSISTANT

## 2021-05-07 RX ORDER — SULFAMETHOXAZOLE AND TRIMETHOPRIM 800; 160 MG/1; MG/1
1 TABLET ORAL EVERY 12 HOURS SCHEDULED
Qty: 14 TABLET | Refills: 0 | Status: SHIPPED | OUTPATIENT
Start: 2021-05-07 | End: 2021-05-14

## 2021-05-07 NOTE — PROGRESS NOTES
[facility]        NAME: Severino Stewart is a 43 y o  female  : 1985    MRN: [id number]  DATE: May 7, 2021  TIME: 9:54 AM    Assessment and Plan   Local infection of the skin and subcutaneous tissue, unspecified [L08 9]  1  Local infection of the skin and subcutaneous tissue, unspecified  sulfamethoxazole-trimethoprim (BACTRIM DS) 800-160 mg per tablet    TDAP Vaccine greater than or equal to 6yo         Patient Instructions     Take medication as prescribed  Wash with soap and water twice per day, apply topical antibiotic ointment and change bandage  Watch for signs of worsening infection  Tylenol/Ibuprofen for pain   Throw out razor and do not use new razor until symptoms have resolved  Follow up with PCP in 3-5 days  Proceed to  ER if symptoms worsen  Eat yogurt with live and active cultures and/or take a probiotic at least 3 hours before or after antibiotic dose  Monitor stool for diarrhea and/or blood  If this occurs, contact primary care doctor ASAP  Chief Complaint     Chief Complaint   Patient presents with    Cellulitis     Skin infection left knee since Monday  History of Present Illness       Reports redness, pain, swelling and discharge from L anterior leg x Wednesday  Patient states she shaved her legs Monday/Tuesday before symptoms began  She has been cleaning with peroxide, applying wound spray and a bandage  She has a substantial history of MRSA with surgical debridement  Review of Systems   Review of Systems   Constitutional: Negative for chills and fever  Musculoskeletal: Negative for joint swelling  Skin: Positive for color change           Current Medications       Current Outpatient Medications:     escitalopram (LEXAPRO) 10 mg tablet, Take 1 tablet (10 mg total) by mouth daily, Disp: 90 tablet, Rfl: 3    fluticasone (FLONASE) 50 mcg/act nasal spray, 2 sprays into each nostril daily, Disp: 3 Bottle, Rfl: 3    levocetirizine (XYZAL) 5 MG tablet, Take 1 tablet (5 mg total) by mouth every evening, Disp: 90 tablet, Rfl: 3    lisinopril (ZESTRIL) 20 mg tablet, TAKE 1 TABLET DAILY, Disp: 90 tablet, Rfl: 1    sulfamethoxazole-trimethoprim (BACTRIM DS) 800-160 mg per tablet, Take 1 tablet by mouth every 12 (twelve) hours for 7 days, Disp: 14 tablet, Rfl: 0    Synthroid 200 MCG tablet, Take 1 tablet (200 mcg total) by mouth daily BRAND NAME ONLY SYNTHROID- add to 50mcg for total of 250mcg daily, Disp: 90 tablet, Rfl: 1    Current Allergies     Allergies as of 2021 - Reviewed 2021   Allergen Reaction Noted    Other  08/15/2018    Vancomycin  08/15/2018            The following portions of the patient's history were reviewed and updated as appropriate: allergies, current medications, past family history, past medical history, past social history, past surgical history and problem list      Past Medical History:   Diagnosis Date    Acute sinus infection     Anxiety     Asthma     Depression     Disease of thyroid gland     GERD (gastroesophageal reflux disease)     Heel spur     right    Hypertension     MRSA (methicillin resistant Staphylococcus aureus)     right leg & right leg inner thigh    Neck pain     Obesity     Sleep disturbance        Past Surgical History:   Procedure Laterality Date    BREAST BIOPSY Right     benign     SECTION      x1 live birth   Earna Maria Luz  SECTION      LEG SURGERY Right     removal of MRSA    THYROIDECTOMY         Family History   Problem Relation Age of Onset    Cervical cancer Mother     Hypertension Mother     Hypertension Father     Heart disease Maternal Uncle     Breast cancer Paternal Grandmother     No Known Problems Sister     No Known Problems Daughter     No Known Problems Maternal Grandmother     No Known Problems Maternal Grandfather     No Known Problems Paternal Grandfather     No Known Problems Sister     No Known Problems Daughter     No Known Problems Paternal Aunt     No Known Problems Paternal Aunt     No Known Problems Paternal Aunt     No Known Problems Paternal Aunt     No Known Problems Paternal Aunt     No Known Problems Paternal Aunt     No Known Problems Paternal Aunt          Medications have been verified  Objective   /78   Pulse 76   Temp 97 8 °F (36 6 °C)   Resp 18   SpO2 98%   No LMP recorded  Physical Exam     Physical Exam  Constitutional:       General: She is not in acute distress  Appearance: She is well-developed  Cardiovascular:      Rate and Rhythm: Normal rate and regular rhythm  Heart sounds: Normal heart sounds  No murmur  No friction rub  No gallop  Pulmonary:      Effort: Pulmonary effort is normal  No respiratory distress  Breath sounds: Normal breath sounds  No wheezing or rales  Chest:      Chest wall: No tenderness  Lymphadenopathy:      Cervical: Cervical adenopathy present  Skin:     General: Skin is warm  Findings: Erythema present  Neurological:      Mental Status: She is alert  Psychiatric:         Behavior: Behavior normal          Thought Content:  Thought content normal          Judgment: Judgment normal

## 2021-05-07 NOTE — PATIENT INSTRUCTIONS
Take medication as prescribed  Wash with soap and water twice per day, apply topical antibiotic ointment and change bandage  Watch for signs of worsening infection  Tylenol/Ibuprofen for pain   Throw out razor and do not use new razor until symptoms have resolved  Follow up with PCP in 3-5 days  Proceed to  ER if symptoms worsen  Eat yogurt with live and active cultures and/or take a probiotic at least 3 hours before or after antibiotic dose  Monitor stool for diarrhea and/or blood  If this occurs, contact primary care doctor ASAP  Acute Wound Care   AMBULATORY CARE:   An acute wound  is an injury that causes a break in the skin  An acute wound can happen suddenly, last a short time, and may heal on its own  Common signs and symptoms of an acute wound:   · A cut, tear, or gash in your skin    · Bleeding, swelling, pain, or trouble moving the affected area    · Dirt or foreign objects inside the wound     · Milky, yellow, green, or brown pus in the wound     · Red, tender, or warm area around the pus    · Fever  Seek care immediately if:   · You have pus or a foul odor coming from the wound  · You have sudden trouble breathing or chest pain  · Blood soaks through your bandage  Contact your healthcare provider if:   · You have muscle, joint, or body aches, sweating, or a fever  · You have more swelling, redness, or bleeding in your wound  · Your skin is itchy, swollen, or you have a rash  · You have questions or concerns about your condition or care  Treatment for an acute wound  may include any of the following:  · Cleansing  is done with soap and water to wash away germs and decrease the risk of infection  Sterile water further cleans the wound  The cleaning is done under high pressure with a catheter tip and large syringe  A solution that kills germs may also be used  · Debridement  is done to clean and remove objects, dirt, or dead tissues from the open wound   Healthcare providers may also drain the wound to clean out pus  · Closure of the wound  is done with stitches, staples, skin adhesive, or other treatments  This may be done if the wound is wide or deep  Stitches may be needed if the wound is in an area that moves a lot, such as the hands, feet, and joints  Stitches may help to keep the wound from getting infected  They may also decrease the amount of scarring you have  Some wounds may heal better without stitches  Wound care:   · If your wound was closed with thin strips of medical tape, keep them clean and dry  The strips of medical tape will fall off on their own  Do not pull them off  · Keep the bandage clean and dry  Do not remove the bandage over your wound unless your healthcare provider says it is okay  · Wash your hands before and after you take care of your wound to prevent infection  · Clean the wound as directed  If you cannot reach the wound, have someone help you  · If you have packing, make sure all the gauze used to pack the wound is taken out and replaced as directed  Keep track of how many gauze dressings are placed inside the wound  Follow up with your healthcare provider as directed:  Write down your questions so you remember to ask them during your visits  © 2016 [employer] is for End User's use only and may not be sold, redistributed or otherwise used for commercial purposes  All illustrations and images included in CareNotes® are the copyrighted property of [employer]  or [employer]  The above information is an  only  It is not intended as medical advice for individual conditions or treatments  Talk to your doctor, nurse or pharmacist before following any medical regimen to see if it is safe and effective for you

## 2021-05-26 ENCOUNTER — APPOINTMENT (OUTPATIENT)
Dept: LAB | Facility: CLINIC | Age: 36
End: 2021-05-26
Payer: COMMERCIAL

## 2021-05-26 ENCOUNTER — OFFICE VISIT (OUTPATIENT)
Dept: URGENT CARE | Facility: CLINIC | Age: 36
End: 2021-05-26
Payer: COMMERCIAL

## 2021-05-26 VITALS
SYSTOLIC BLOOD PRESSURE: 151 MMHG | HEART RATE: 78 BPM | OXYGEN SATURATION: 99 % | TEMPERATURE: 97.7 F | DIASTOLIC BLOOD PRESSURE: 71 MMHG | BODY MASS INDEX: 45.19 KG/M2 | RESPIRATION RATE: 18 BRPM | HEIGHT: 66 IN

## 2021-05-26 DIAGNOSIS — E03.9 HYPOTHYROIDISM, UNSPECIFIED TYPE: ICD-10-CM

## 2021-05-26 DIAGNOSIS — L02.413 ABSCESS OF RIGHT ARM: Primary | ICD-10-CM

## 2021-05-26 LAB
T4 FREE SERPL-MCNC: 1.24 NG/DL (ref 0.76–1.46)
TSH SERPL DL<=0.05 MIU/L-ACNC: <0.007 UIU/ML (ref 0.36–3.74)

## 2021-05-26 PROCEDURE — 87070 CULTURE OTHR SPECIMN AEROBIC: CPT | Performed by: PHYSICIAN ASSISTANT

## 2021-05-26 PROCEDURE — G0382 LEV 3 HOSP TYPE B ED VISIT: HCPCS | Performed by: PHYSICIAN ASSISTANT

## 2021-05-26 PROCEDURE — 36415 COLL VENOUS BLD VENIPUNCTURE: CPT

## 2021-05-26 PROCEDURE — 84443 ASSAY THYROID STIM HORMONE: CPT

## 2021-05-26 PROCEDURE — 87186 SC STD MICRODIL/AGAR DIL: CPT | Performed by: PHYSICIAN ASSISTANT

## 2021-05-26 PROCEDURE — 84439 ASSAY OF FREE THYROXINE: CPT

## 2021-05-26 PROCEDURE — 87205 SMEAR GRAM STAIN: CPT | Performed by: PHYSICIAN ASSISTANT

## 2021-05-26 RX ORDER — SULFAMETHOXAZOLE AND TRIMETHOPRIM 800; 160 MG/1; MG/1
1 TABLET ORAL EVERY 12 HOURS SCHEDULED
Qty: 14 TABLET | Refills: 0 | Status: SHIPPED | OUTPATIENT
Start: 2021-05-26 | End: 2021-06-02

## 2021-05-26 NOTE — PROGRESS NOTES
[facility]    NAME: Glenda Hutchison is a 43 y o  female  : 1985    MRN: [id number]  DATE: May 26, 2021  TIME: 9:55 AM    Assessment and Plan   Abscess of right arm [L02 413]  1  Abscess of right arm  sulfamethoxazole-trimethoprim (BACTRIM DS) 800-160 mg per tablet    Wound culture and Gram stain       Patient Instructions     Patient Instructions     Antibiotic as directed  If worsening, go to the ER  Chief Complaint     Chief Complaint   Patient presents with    Rash     Right arm, X , itches, hurts       History of Present Illness     77-year-old female here with complaint of pain, abscess in the right antecubital fossa  Started couple days ago has been getting progressively worse  Patient has a history of MRSA  States that the area is painful and reddened  No fever or chills  There has been purulent drainage from the area  Review of Systems   Review of Systems   Constitutional: Negative for chills and fever  Respiratory: Negative for cough and shortness of breath  Cardiovascular: Negative for leg swelling  Skin: Positive for color change and wound  All other systems reviewed and are negative        Current Medications     Current Outpatient Medications:     escitalopram (LEXAPRO) 10 mg tablet, Take 1 tablet (10 mg total) by mouth daily, Disp: 90 tablet, Rfl: 3    fluticasone (FLONASE) 50 mcg/act nasal spray, 2 sprays into each nostril daily, Disp: 3 Bottle, Rfl: 3    levocetirizine (XYZAL) 5 MG tablet, Take 1 tablet (5 mg total) by mouth every evening, Disp: 90 tablet, Rfl: 3    lisinopril (ZESTRIL) 20 mg tablet, TAKE 1 TABLET DAILY, Disp: 90 tablet, Rfl: 1    Synthroid 200 MCG tablet, Take 1 tablet (200 mcg total) by mouth daily BRAND NAME ONLY SYNTHROID- add to 50mcg for total of 250mcg daily, Disp: 90 tablet, Rfl: 1    sulfamethoxazole-trimethoprim (BACTRIM DS) 800-160 mg per tablet, Take 1 tablet by mouth every 12 (twelve) hours for 7 days, Disp: 14 tablet, Rfl: 0    Current Allergies     Allergies as of 2021 - Reviewed 2021   Allergen Reaction Noted    Other  08/15/2018    Vancomycin  08/15/2018          The following portions of the patient's history were reviewed and updated as appropriate: allergies, current medications, past family history, past medical history, past social history, past surgical history and problem list    Past Medical History:   Diagnosis Date    Acute sinus infection     Anxiety     Asthma     Depression     Disease of thyroid gland     GERD (gastroesophageal reflux disease)     Heel spur     right    Hypertension     MRSA (methicillin resistant Staphylococcus aureus)     right leg & right leg inner thigh    Neck pain     Obesity     Sleep disturbance      Past Surgical History:   Procedure Laterality Date    BREAST BIOPSY Right     benign     SECTION      x1 live birth   [facility]  SECTION      LEG SURGERY Right 2015    removal of MRSA    THYROIDECTOMY  2015     Family History   Problem Relation Age of Onset    Cervical cancer Mother     Hypertension Mother     Hypertension Father     Heart disease Maternal Uncle     Breast cancer Paternal Grandmother     No Known Problems Sister     No Known Problems Daughter     No Known Problems Maternal Grandmother     No Known Problems Maternal Grandfather     No Known Problems Paternal Grandfather     No Known Problems Sister     No Known Problems Daughter     No Known Problems Paternal Aunt     No Known Problems Paternal Aunt     No Known Problems Paternal Aunt     No Known Problems Paternal Aunt     No Known Problems Paternal Aunt     No Known Problems Paternal Aunt     No Known Problems Paternal Aunt      Social History     Socioeconomic History    Marital status: /Civil Union     Spouse name: Not on file    Number of children: 2    Years of education: Not on file    Highest education level: Not on file   Occupational History    Occupation: employed   Social Needs    Financial resource strain: Not on file    Food insecurity     Worry: Not on file     Inability: Not on file   [employer] needs     Medical: Not on file     Non-medical: Not on file   Tobacco Use    Smoking status: Never Smoker    Smokeless tobacco: Never Used   Substance and Sexual Activity    Alcohol use: Yes     Comment: socially    Drug use: No    Sexual activity: Not on file   Lifestyle    Physical activity     Days per week: Not on file     Minutes per session: Not on file    Stress: Not on file   Relationships    Social connections     Talks on phone: Not on file     Gets together: Not on file     Attends [employer] service: Not on file     Active member of club or organization: Not on file     Attends meetings of clubs or organizations: Not on file     Relationship status: Not on file    Intimate partner violence     Fear of current or ex partner: Not on file     Emotionally abused: Not on file     Physically abused: Not on file     Forced sexual activity: Not on file   Other Topics Concern    Not on file   Social History Narrative    Daily Caffeine Use- 2 cups of soda and coffee/hot tea on occasion     Medications have been verified  Objective   /71   Pulse 78   Temp 97 7 °F (36 5 °C)   Resp 18   Ht 5' 6" (1 676 m)   SpO2 99%   BMI 45 19 kg/m²      Physical Exam   Physical Exam  Vitals signs and nursing note reviewed  Constitutional:       Appearance: She is obese  HENT:      Head: Normocephalic and atraumatic  Neck:      Musculoskeletal: Normal range of motion  Cardiovascular:      Rate and Rhythm: Normal rate and regular rhythm  Pulmonary:      Effort: Pulmonary effort is normal    Skin:     Findings: Erythema (tender abscess right antecubital fossa with purulent drainage) present

## 2021-05-28 LAB
BACTERIA WND AEROBE CULT: ABNORMAL
GRAM STN SPEC: ABNORMAL

## 2021-06-02 ENCOUNTER — OFFICE VISIT (OUTPATIENT)
Dept: FAMILY MEDICINE CLINIC | Facility: CLINIC | Age: 36
End: 2021-06-02
Payer: COMMERCIAL

## 2021-06-02 VITALS
BODY MASS INDEX: 45.26 KG/M2 | RESPIRATION RATE: 22 BRPM | TEMPERATURE: 98.2 F | DIASTOLIC BLOOD PRESSURE: 70 MMHG | HEART RATE: 72 BPM | WEIGHT: 281.6 LBS | HEIGHT: 66 IN | OXYGEN SATURATION: 99 % | SYSTOLIC BLOOD PRESSURE: 122 MMHG

## 2021-06-02 DIAGNOSIS — Z51.89 WOUND CHECK, ABSCESS: ICD-10-CM

## 2021-06-02 DIAGNOSIS — L02.91 ABSCESS: Primary | ICD-10-CM

## 2021-06-02 DIAGNOSIS — Z86.14 HISTORY OF MRSA INFECTION: ICD-10-CM

## 2021-06-02 PROCEDURE — 3725F SCREEN DEPRESSION PERFORMED: CPT | Performed by: NURSE PRACTITIONER

## 2021-06-02 PROCEDURE — 99213 OFFICE O/P EST LOW 20 MIN: CPT | Performed by: NURSE PRACTITIONER

## 2021-06-02 NOTE — PROGRESS NOTES
[facility]        NAME: Brandon Mathew is a 43 y o  female  : 1985    MRN: [id number]  DATE: 2021  TIME: 11:47 AM    Assessment and Plan   Abscess [L02 91]  1  Abscess  Ambulatory referral to Wound Care   2  Wound check, abscess  Ambulatory referral to Wound Care   3  History of MRSA infection       1  Abscess   follow up with wound management, no infection at this time  - Ambulatory referral to Wound Care; Future    2  Wound check, abscess    - Ambulatory referral to Wound Care; Future    BMI Counseling: Body mass index is 45 45 kg/m²  The BMI is above normal  Nutrition recommendations include encouraging healthy choices of fruits and vegetables, consuming healthier snacks, reducing intake of saturated and trans fat and reducing intake of cholesterol  Exercise recommendations include exercising 3-5 times per week  Patient Instructions     There are no Patient Instructions on file for this visit  Chief Complaint     Chief Complaint   Patient presents with    Wound Check     Right arm          History of Present Illness       Patient here for follow up visit on abscess to her right forearm  Patient reports it is improving but now she states it is looking like a hole in her arm and does not want another wound vac like she had with her prior MRSA infection  Reports this wound tested + for MRSA and is on her last dose of bactrim as prescribed by urgent care who ordered the wound culture  No signs of infection but patient worried  Review of Systems   Review of Systems   Constitutional: Negative  Negative for fatigue and fever  Respiratory: Negative  Negative for cough  Cardiovascular: Negative  Musculoskeletal: Negative  Skin: Positive for color change          Wound to right forearm       PHQ-9 Depression Screening    PHQ-9:   Frequency of the following problems over the past two weeks:      Little interest or pleasure in doing things: 0 - not at all  Feeling down, depressed, or hopeless: 0 - not at all  PHQ-2 Score: 0        Current Medications       Current Outpatient Medications:     escitalopram (LEXAPRO) 10 mg tablet, Take 1 tablet (10 mg total) by mouth daily, Disp: 90 tablet, Rfl: 3    fluticasone (FLONASE) 50 mcg/act nasal spray, 2 sprays into each nostril daily, Disp: 3 Bottle, Rfl: 3    levocetirizine (XYZAL) 5 MG tablet, Take 1 tablet (5 mg total) by mouth every evening, Disp: 90 tablet, Rfl: 3    lisinopril (ZESTRIL) 20 mg tablet, TAKE 1 TABLET DAILY, Disp: 90 tablet, Rfl: 1    Synthroid 200 MCG tablet, Take 1 tablet (200 mcg total) by mouth daily BRAND NAME ONLY SYNTHROID- add to 50mcg for total of 250mcg daily, Disp: 90 tablet, Rfl: 1    Current Allergies     Allergies as of 2021 - Reviewed 2021   Allergen Reaction Noted    Other  08/15/2018    Vancomycin  08/15/2018            The following portions of the patient's history were reviewed and updated as appropriate: allergies, current medications, past family history, past medical history, past social history, past surgical history and problem list      Past Medical History:   Diagnosis Date    Acute sinus infection     Anxiety     Asthma     Depression     Disease of thyroid gland     GERD (gastroesophageal reflux disease)     Heel spur     right    Hypertension     MRSA (methicillin resistant Staphylococcus aureus)     right leg & right leg inner thigh    Neck pain     Obesity     Sleep disturbance        Past Surgical History:   Procedure Laterality Date    BREAST BIOPSY Right     benign     SECTION      x1 live birth   [facility]  SECTION      LEG SURGERY Right     removal of MRSA    THYROIDECTOMY         Family History   Problem Relation Age of Onset    Cervical cancer Mother     Hypertension Mother     Hypertension Father     Heart disease Maternal Uncle     Breast cancer Paternal Grandmother     No Known Problems Sister     No Known Problems Daughter     No Known Problems Maternal Grandmother     No Known Problems Maternal Grandfather     No Known Problems Paternal Grandfather     No Known Problems Sister     No Known Problems Daughter     No Known Problems Paternal Aunt     No Known Problems Paternal Aunt     No Known Problems Paternal Aunt     No Known Problems Paternal Aunt     No Known Problems Paternal Aunt     No Known Problems Paternal Aunt     No Known Problems Paternal Aunt          Medications have been verified  Objective   /70 (BP Location: Left arm, Patient Position: Sitting, Cuff Size: Large)   Pulse 72   Temp 98 2 °F (36 8 °C)   Resp 22   Ht 5' 6" (1 676 m)   Wt 128 kg (281 lb 9 6 oz)   LMP 05/03/2021   SpO2 99%   BMI 45 45 kg/m²        Physical Exam     Physical Exam  Vitals signs and nursing note reviewed  Constitutional:       General: She is not in acute distress  Appearance: She is well-developed  She is not ill-appearing  HENT:      Head: Normocephalic and atraumatic  Neck:      Musculoskeletal: No neck rigidity  Trachea: No tracheal deviation  Pulmonary:      Effort: Pulmonary effort is normal  No tachypnea, accessory muscle usage or respiratory distress  Skin:         Neurological:      Mental Status: She is alert and oriented to person, place, and time  Psychiatric:         Speech: Speech normal          Behavior: Behavior normal  Behavior is cooperative

## 2021-06-03 PROBLEM — Z86.14 HISTORY OF MRSA INFECTION: Status: ACTIVE | Noted: 2021-06-03

## 2021-06-08 ENCOUNTER — OFFICE VISIT (OUTPATIENT)
Dept: WOUND CARE | Facility: CLINIC | Age: 36
End: 2021-06-08
Payer: COMMERCIAL

## 2021-06-08 VITALS
RESPIRATION RATE: 16 BRPM | SYSTOLIC BLOOD PRESSURE: 150 MMHG | HEART RATE: 78 BPM | DIASTOLIC BLOOD PRESSURE: 76 MMHG | TEMPERATURE: 97.6 F

## 2021-06-08 DIAGNOSIS — S41.101A WOUND OF RIGHT UPPER EXTREMITY, INITIAL ENCOUNTER: Primary | ICD-10-CM

## 2021-06-08 PROCEDURE — G0463 HOSPITAL OUTPT CLINIC VISIT: HCPCS | Performed by: FAMILY MEDICINE

## 2021-06-08 PROCEDURE — 97597 DBRDMT OPN WND 1ST 20 CM/<: CPT | Performed by: FAMILY MEDICINE

## 2021-06-08 PROCEDURE — 99203 OFFICE O/P NEW LOW 30 MIN: CPT | Performed by: FAMILY MEDICINE

## 2021-06-08 PROCEDURE — 99213 OFFICE O/P EST LOW 20 MIN: CPT | Performed by: FAMILY MEDICINE

## 2021-06-08 RX ORDER — LIDOCAINE 40 MG/G
CREAM TOPICAL ONCE
Status: COMPLETED | OUTPATIENT
Start: 2021-06-08 | End: 2021-06-08

## 2021-06-08 RX ADMIN — LIDOCAINE: 40 CREAM TOPICAL at 09:23

## 2021-06-08 NOTE — PATIENT INSTRUCTIONS
Orders Placed This Encounter   Procedures    Wound cleansing and dressings     Wash your hands with soap and water  Remove old dressing, discard into plastic bag and place in trash  Cleanse the wound with soap and water prior to applying a clean dressing  Do not use tissue or cotton balls  Do not scrub the wound  Pat dry using gauze  Shower yes     Apply Mupirocin ointment to the Right arm wound  Cover with 2X2 gauze pad  Secure with tubifast wrap     Change dressing daily    This was done at today's appointment  At home, apply warm compress to wound 10 minutes prior to dressing       Standing Status:   Future     Standing Expiration Date:   6/8/2022

## 2021-06-08 NOTE — PROGRESS NOTES
Patient ID: Miguelina Layne is a 43 y o  female Date of Birth 1985       Chief Complaint   Patient presents with    New Patient Visit     wound RUE       Allergies: Other and Vancomycin    Diagnosis:      Diagnosis ICD-10-CM Associated Orders   1  Wound of right upper extremity, initial encounter  S41 101A lidocaine (LMX) 4 % cream     Wound cleansing and dressings     Debridement           Assessment :     S/p MRSA abscess drainage, now with open wound  S/p Bactrim treatment  Plan:     Selective debridement today  Mupirocin ointment and dry sterile dressing daily  Warm compresses  Followup in one week  Subjective:     6/8/21: This is a 49-year-old female who presents to the wound center because of open wound of the right antecubital fossa  About two weeks ago she developed a painful swelling on the right  She went to urgent Care where an abscess was drained without I&D and culture was positive for MRSA  Patient has a prior history of MRSA abscess of the left thigh years ago  She was treated with Bactrim DS and the swelling has decreased dramatically but there is still a small opening  Her primary care physician has referred her to the wound center  Patient has no history of diabetes  Denies any fever or chills        The following portions of the patient's history were reviewed and updated as appropriate:   Patient Active Problem List   Diagnosis    Hypertension    GERD (gastroesophageal reflux disease)    Asthma    Arthritis    Class 3 severe obesity due to excess calories with serious comorbidity and body mass index (BMI) of 40 0 to 44 9 in [facility])    Chronic sinusitis    B12 deficiency    Iron deficiency anemia    Vitamin D deficiency    Hypothyroidism    H/O total thyroidectomy    Celiac disease    Anxiety    History of MRSA infection     Past Medical History:   Diagnosis Date    Acute sinus infection     Anxiety     Asthma     Depression     Disease of thyroid gland     GERD (gastroesophageal reflux disease)     Heel spur     right    Hypertension     MRSA (methicillin resistant Staphylococcus aureus)     right leg & right leg inner thigh    Neck pain     Obesity     Sleep disturbance      Past Surgical History:   Procedure Laterality Date    BREAST BIOPSY Right     benign     SECTION      x1 live birth   [facility]  SECTION      LEG SURGERY Right 2015    removal of MRSA    THYROIDECTOMY  2015     Family History   Problem Relation Age of Onset    Cervical cancer Mother     Hypertension Mother     Hypertension Father     Heart disease Maternal Uncle     Breast cancer Paternal Grandmother     No Known Problems Sister     No Known Problems Daughter     No Known Problems Maternal Grandmother     No Known Problems Maternal Grandfather     No Known Problems Paternal Grandfather     No Known Problems Sister     No Known Problems Daughter     No Known Problems Paternal Aunt     No Known Problems Paternal Aunt     No Known Problems Paternal Aunt     No Known Problems Paternal Aunt     No Known Problems Paternal Aunt     No Known Problems Paternal Aunt     No Known Problems Paternal Aunt       Social History     Socioeconomic History    Marital status: /Civil Union     Spouse name: Not on file    Number of children: 2    Years of education: Not on file    Highest education level: Not on file   Occupational History    Occupation: employed   Social Needs    Financial resource strain: Not on file    Food insecurity     Worry: Not on file     Inability: Not on file   [employer] needs     Medical: Not on file     Non-medical: Not on file   Tobacco Use    Smoking status: Never Smoker    Smokeless tobacco: Never Used   Substance and Sexual Activity    Alcohol use: Yes     Comment: socially    Drug use: No    Sexual activity: Not on file   Lifestyle    Physical activity     Days per week: Not on file     Minutes per session: Not on file    Stress: Not on file   Relationships    Social connections     Talks on phone: Not on file     Gets together: Not on file     Attends [employer] service: Not on file     Active member of club or organization: Not on file     Attends meetings of clubs or organizations: Not on file     Relationship status: Not on file    Intimate partner violence     Fear of current or ex partner: Not on file     Emotionally abused: Not on file     Physically abused: Not on file     Forced sexual activity: Not on file   Other Topics Concern    Not on file   Social History Narrative    Daily Caffeine Use- 2 cups of soda and coffee/hot tea on occasion        Current Outpatient Medications:     escitalopram (LEXAPRO) 10 mg tablet, Take 1 tablet (10 mg total) by mouth daily, Disp: 90 tablet, Rfl: 3    fluticasone (FLONASE) 50 mcg/act nasal spray, 2 sprays into each nostril daily, Disp: 3 Bottle, Rfl: 3    levocetirizine (XYZAL) 5 MG tablet, Take 1 tablet (5 mg total) by mouth every evening, Disp: 90 tablet, Rfl: 3    lisinopril (ZESTRIL) 20 mg tablet, TAKE 1 TABLET DAILY, Disp: 90 tablet, Rfl: 1    Synthroid 200 MCG tablet, Take 1 tablet (200 mcg total) by mouth daily BRAND NAME ONLY SYNTHROID- add to 50mcg for total of 250mcg daily, Disp: 90 tablet, Rfl: 1  No current facility-administered medications for this visit  Review of Systems   Constitutional: Negative for appetite change, chills, fatigue, fever and unexpected weight change  HENT: Negative for congestion, hearing loss, postnasal drip and sinus pressure  Eyes: Negative for discharge and visual disturbance  Respiratory: Negative for cough and shortness of breath  Cardiovascular: Negative for chest pain, palpitations and leg swelling  Gastrointestinal: Negative for abdominal pain (Only one related to see celiac disease), blood in stool, constipation, diarrhea and nausea  Endocrine: Negative      Genitourinary: Negative for difficulty urinating, dysuria and urgency  Musculoskeletal: Negative for back pain and gait problem  Skin: Positive for wound (Right antecubital fossa)  Negative for rash  Allergic/Immunologic: Negative  Neurological: Negative for dizziness, tremors, seizures, weakness, numbness and headaches  Hematological: Does not bruise/bleed easily  Psychiatric/Behavioral: Positive for dysphoric mood  The patient is nervous/anxious  Objective:  /76   Pulse 78   Temp 97 6 °F (36 4 °C)   Resp 16   Pain Score: 0-No pain     Physical Exam  Vitals signs and nursing note reviewed  Constitutional:       Appearance: Normal appearance  She is well-developed  She is obese  HENT:      Head: Normocephalic and atraumatic  Right Ear: External ear normal       Left Ear: External ear normal       Mouth/Throat:      Comments: masked  Eyes:      General: Lids are normal          Right eye: No discharge  Left eye: No discharge  Conjunctiva/sclera: Conjunctivae normal       Pupils: Pupils are equal, round, and reactive to light  Neck:      Musculoskeletal: Neck supple  Cardiovascular:      Rate and Rhythm: Normal rate and regular rhythm  Heart sounds: Normal heart sounds  No murmur  No friction rub  No gallop  Pulmonary:      Effort: Pulmonary effort is normal       Breath sounds: Normal breath sounds and air entry  Abdominal:      General: Abdomen is flat  Palpations: Abdomen is soft  There is no hepatomegaly or splenomegaly  Tenderness: There is no abdominal tenderness  There is no guarding or rebound  Musculoskeletal:      Right lower leg: No edema  Left lower leg: No edema  Lymphadenopathy:      Cervical: No cervical adenopathy  Skin:     General: Skin is warm and dry  Findings: Erythema and wound present  Comments:   Mild erythema with surrounding induration but no fluctuance  Slough in the base     Neurological:      Mental Status: She is alert and oriented to person, place, and time  Gait: Gait is intact  Psychiatric:         Attention and Perception: Attention normal          Mood and Affect: Affect normal  Mood is anxious  Speech: Speech normal          Behavior: Behavior is cooperative  Thought Content: Thought content normal          Cognition and Memory: Cognition normal              Wound 06/08/21  Arm Right; Inner (Active)   Wound Image Images linked 06/08/21 0911   Wound Description Pink;Yellow 06/08/21 0915   Gi-wound Assessment Pink 06/08/21 0915   Wound Length (cm) 0 1 cm 06/08/21 0915   Wound Width (cm) 1 cm 06/08/21 0915   Wound Depth (cm) 0 1 cm 06/08/21 0915   Wound Surface Area (cm^2) 0 1 cm^2 06/08/21 0915   Wound Volume (cm^3) 0 01 cm^3 06/08/21 0915   Calculated Wound Volume (cm^3) 0 01 cm^3 06/08/21 0915   Drainage Amount Small 06/08/21 0915   Drainage Description Serous 06/08/21 0915   Non-staged Wound Description Full thickness 06/08/21 0915   Treatments Irrigation with NSS 06/08/21 0915   Dressing Status -- (open to air) 06/08/21 0915       Debridement   Wound 06/08/21  Arm Right; Inner    Universal Protocol:  Consent: Verbal consent obtained  Written consent obtained  Consent given by: patient  Time out: Immediately prior to procedure a "time out" was called to verify the correct patient, procedure, equipment, support staff and site/side marked as required    Patient understanding: patient states understanding of the procedure being performed  Patient identity confirmed: verbally with patient      Performed by: physician  Debridement type: selective  Pain control: lidocaine 4%  Post-debridement measurements  Length (cm): 0 1  Width (cm): 1  Depth (cm): 0 1  Percent debrided: 100%  Surface Area (cm^2): 0 1  Area debrided (cm^2): 0 1  Volume (cm^3): 0 01  Devitalized tissue debrided: necrotic debris and slough  Instrument(s) utilized: curette  Bleeding: small  Hemostasis obtained with: pressure  Procedural pain (0-10): 0  Post-procedural pain: 0   Response to treatment: procedure was tolerated well           Results from last 6 Months   Lab Units 05/26/21  0937   WOUND CULTURE  1+ Growth of Methicillin Resistant Staphylococcus aureus*       Wound Instructions:  Orders Placed This Encounter   Procedures    Wound cleansing and dressings     Wash your hands with soap and water  Remove old dressing, discard into plastic bag and place in trash  Cleanse the wound with soap and water prior to applying a clean dressing  Do not use tissue or cotton balls  Do not scrub the wound  Pat dry using gauze  Shower yes     Apply Mupirocin ointment to the Right arm wound  Cover with 2X2 gauze pad  Secure with tubifast wrap     Change dressing daily    This was done at today's appointment  At home, apply warm compress to wound 10 minutes prior to dressing  Standing Status:   Future     Standing Expiration Date:   6/8/2022    Debridement     This order was created via procedure documentation       Total time spent today:   30 minutes  This includes reviewing the patient's chart, pertinent physician records  Of urgent care visit from 05/07 and 5/26/21 as well as office visit of primary care 6/2/21 and  Laboratory data including T4, TSH wound culture from 5/26/21  Keyla Garcia MD, CHT, CWS    Portions of the record may have been created with voice recognition software  Occasional wrong word or "sound alike" substitutions may have occurred due to the inherent limitations of voice recognition software  Read the chart carefully and recognize, using context, where substitutions have occurred

## 2021-06-10 ENCOUNTER — OFFICE VISIT (OUTPATIENT)
Dept: FAMILY MEDICINE CLINIC | Facility: CLINIC | Age: 36
End: 2021-06-10
Payer: COMMERCIAL

## 2021-06-10 VITALS
RESPIRATION RATE: 20 BRPM | WEIGHT: 285.4 LBS | HEIGHT: 66 IN | TEMPERATURE: 98 F | OXYGEN SATURATION: 100 % | DIASTOLIC BLOOD PRESSURE: 82 MMHG | SYSTOLIC BLOOD PRESSURE: 134 MMHG | BODY MASS INDEX: 45.87 KG/M2 | HEART RATE: 89 BPM

## 2021-06-10 DIAGNOSIS — A49.02 MRSA INFECTION (METHICILLIN-RESISTANT STAPHYLOCOCCUS AUREUS): ICD-10-CM

## 2021-06-10 DIAGNOSIS — E66.01 CLASS 3 SEVERE OBESITY DUE TO EXCESS CALORIES WITH SERIOUS COMORBIDITY AND BODY MASS INDEX (BMI) OF 45.0 TO 49.9 IN ADULT (HCC): ICD-10-CM

## 2021-06-10 DIAGNOSIS — J45.20 MILD INTERMITTENT ASTHMA WITHOUT COMPLICATION: ICD-10-CM

## 2021-06-10 DIAGNOSIS — Z13.220 SCREENING CHOLESTEROL LEVEL: ICD-10-CM

## 2021-06-10 DIAGNOSIS — F41.9 ANXIETY: ICD-10-CM

## 2021-06-10 DIAGNOSIS — I10 ESSENTIAL HYPERTENSION: ICD-10-CM

## 2021-06-10 DIAGNOSIS — R73.03 PREDIABETES: ICD-10-CM

## 2021-06-10 DIAGNOSIS — E03.9 ACQUIRED HYPOTHYROIDISM: ICD-10-CM

## 2021-06-10 DIAGNOSIS — Z00.00 ANNUAL PHYSICAL EXAM: Primary | ICD-10-CM

## 2021-06-10 PROCEDURE — 1036F TOBACCO NON-USER: CPT | Performed by: NURSE PRACTITIONER

## 2021-06-10 PROCEDURE — 99213 OFFICE O/P EST LOW 20 MIN: CPT | Performed by: NURSE PRACTITIONER

## 2021-06-10 PROCEDURE — 99395 PREV VISIT EST AGE 18-39: CPT | Performed by: NURSE PRACTITIONER

## 2021-06-10 PROCEDURE — 3008F BODY MASS INDEX DOCD: CPT | Performed by: NURSE PRACTITIONER

## 2021-06-10 NOTE — PATIENT INSTRUCTIONS
Get labs done in about 6 weeks- end of July- fast for 10 hours due to cholesterol screening  Continue Levothyroxine 200mcg alternating every other day with 100mcg  Bactroban in both nostrils at bedtime with a q tip x 3 months  Return in 6 months or sooner for problems/concerns

## 2021-06-10 NOTE — PROGRESS NOTES
[facility]        NAME: Muna Bangura is a 43 y o  female  : 1985    MRN: [id number]  DATE: Cathi 10, 2021  TIME: 10:03 AM    Assessment and Plan   MRSA infection (methicillin-resistant Staphylococcus aureus) [A49 02]  1  MRSA infection (methicillin-resistant Staphylococcus aureus)  mupirocin (BACTROBAN) 2 % ointment   2  Mild intermittent asthma without complication     3  Acquired hypothyroidism  TSH, 3rd generation with Free T4 reflex   4  Anxiety     5  Essential hypertension  Comprehensive metabolic panel   6  Screening cholesterol level  Lipid panel   7  Prediabetes  HEMOGLOBIN A1C W/ EAG ESTIMATION   8  Annual physical exam     9  Class 3 severe obesity due to excess calories with serious comorbidity and body mass index (BMI) of 45 0 to 49 9 in [facility])           Patient Instructions     Patient Instructions   Get labs done in about 6 weeks- end of July- fast for 10 hours due to cholesterol screening  Continue Levothyroxine 200mcg alternating every other day with 100mcg  Bactroban in both nostrils at bedtime with a q tip x 3 months  Return in 6 months or sooner for problems/concerns           Chief Complaint     Chief Complaint   Patient presents with    Follow-up         History of Present Illness       Here today for 6 month josé miguel- discussed recent MRSA infection- is slowly healing- has had multiple MRSA infections in the last year- is willing to do Bactroban ointment in nostrils for 3 months to prevent recurrent infection  Reviewed recent TSH- is taking Levothyroxine 200mcg alt  Every other day with 100mcg  Will recheck in a few weeks  Is due for repeat labs  Last HgA1c was 5 7  Review of Systems   Review of Systems   Constitutional: Negative for activity change, diaphoresis, fatigue and fever  HENT: Positive for congestion (chronic- follows with ENT)   Negative for facial swelling, hearing loss, rhinorrhea, sinus pressure, sinus pain, sneezing, sore throat and voice change  Eyes: Negative for discharge and visual disturbance  Respiratory: Negative for cough, choking, chest tightness, shortness of breath, wheezing and stridor  Cardiovascular: Negative for chest pain, palpitations and leg swelling  Gastrointestinal: Negative for abdominal distention, abdominal pain, constipation, diarrhea, nausea and vomiting  Endocrine: Negative for polydipsia, polyphagia and polyuria  Genitourinary: Negative for difficulty urinating, dysuria, frequency and urgency  Musculoskeletal: Negative for arthralgias, back pain, gait problem, joint swelling, myalgias, neck pain and neck stiffness  Skin: Positive for wound (right forearm)  Negative for color change and rash  Neurological: Negative for dizziness, syncope, speech difficulty, weakness, light-headedness and headaches  Hematological: Negative for adenopathy  Does not bruise/bleed easily  Psychiatric/Behavioral: Negative for agitation, behavioral problems, confusion, hallucinations, sleep disturbance and suicidal ideas  The patient is not nervous/anxious            Current Medications       Current Outpatient Medications:     escitalopram (LEXAPRO) 10 mg tablet, Take 1 tablet (10 mg total) by mouth daily, Disp: 90 tablet, Rfl: 3    fluticasone (FLONASE) 50 mcg/act nasal spray, 2 sprays into each nostril daily, Disp: 3 Bottle, Rfl: 3    levocetirizine (XYZAL) 5 MG tablet, Take 1 tablet (5 mg total) by mouth every evening, Disp: 90 tablet, Rfl: 3    lisinopril (ZESTRIL) 20 mg tablet, TAKE 1 TABLET DAILY, Disp: 90 tablet, Rfl: 1    Synthroid 200 MCG tablet, Take 1 tablet (200 mcg total) by mouth daily BRAND NAME ONLY SYNTHROID- add to 50mcg for total of 250mcg daily, Disp: 90 tablet, Rfl: 1    mupirocin (BACTROBAN) 2 % ointment, Put into both nostrils at bedtime with a Qtip x 3 months, Disp: 30 g, Rfl: 2    Current Allergies     Allergies as of 06/10/2021 - Reviewed 06/10/2021   Allergen Reaction Noted    Other 08/15/2018    Vancomycin  08/15/2018            The following portions of the patient's history were reviewed and updated as appropriate: allergies, current medications, past family history, past medical history, past social history, past surgical history and problem list      Past Medical History:   Diagnosis Date    Acute sinus infection     Anxiety     Asthma     Depression     Disease of thyroid gland     GERD (gastroesophageal reflux disease)     Heel spur     right    Hypertension     MRSA (methicillin resistant Staphylococcus aureus)     right leg & right leg inner thigh    Neck pain     Obesity     Sleep disturbance        Past Surgical History:   Procedure Laterality Date    BREAST BIOPSY Right     benign     SECTION      x1 live birth   Phil Vazquez  SECTION      LEG SURGERY Right 2015    removal of MRSA    THYROIDECTOMY  2015       Family History   Problem Relation Age of Onset    Cervical cancer Mother     Hypertension Mother     Hypertension Father     Heart disease Maternal Uncle     Breast cancer Paternal Grandmother     No Known Problems Sister     No Known Problems Daughter     No Known Problems Maternal Grandmother     No Known Problems Maternal Grandfather     No Known Problems Paternal Grandfather     No Known Problems Sister     No Known Problems Daughter     No Known Problems Paternal Aunt     No Known Problems Paternal Aunt     No Known Problems Paternal Aunt     No Known Problems Paternal Aunt     No Known Problems Paternal Aunt     No Known Problems Paternal Aunt     No Known Problems Paternal Aunt          Medications have been verified  Objective   /82   Pulse 89   Temp 98 °F (36 7 °C) (Tympanic)   Resp 20   Ht 5' 6" (1 676 m)   Wt 129 kg (285 lb 6 4 oz)   SpO2 100%   BMI 46 06 kg/m²        Physical Exam     Physical Exam  Vitals signs and nursing note reviewed  Constitutional:       General: She is not in acute distress  Appearance: She is well-developed  She is not diaphoretic  Neck:      Musculoskeletal: Normal range of motion and neck supple  Thyroid: No thyromegaly  Trachea: No tracheal deviation  Cardiovascular:      Rate and Rhythm: Normal rate and regular rhythm  Heart sounds: Normal heart sounds  No murmur  Pulmonary:      Effort: Pulmonary effort is normal  No respiratory distress  Breath sounds: Normal breath sounds  No wheezing  Musculoskeletal: Normal range of motion  General: No tenderness or deformity  Skin:     General: Skin is warm and dry  Findings: No erythema or rash  Neurological:      Mental Status: She is alert and oriented to person, place, and time  Psychiatric:         Attention and Perception: Attention and perception normal          Mood and Affect: Affect normal  Mood is anxious  Speech: Speech normal          Behavior: Behavior normal  Behavior is cooperative  Thought Content:  Thought content normal          Cognition and Memory: Cognition and memory normal          Judgment: Judgment normal

## 2021-06-10 NOTE — PROGRESS NOTES
HPI:  Christopher Rollins is a 43 y o  female here for her yearly health maintenance exam    Patient Active Problem List   Diagnosis    Hypertension    GERD (gastroesophageal reflux disease)    Asthma    Arthritis    Class 3 severe obesity due to excess calories with serious comorbidity and body mass index (BMI) of 40 0 to 44 9 in [facility])    Chronic sinusitis    B12 deficiency    Iron deficiency anemia    Vitamin D deficiency    Hypothyroidism    H/O total thyroidectomy    Celiac disease    Anxiety    History of MRSA infection    Prediabetes     Past Medical History:   Diagnosis Date    Acute sinus infection     Anxiety     Asthma     Depression     Disease of thyroid gland     GERD (gastroesophageal reflux disease)     Heel spur     right    Hypertension     MRSA (methicillin resistant Staphylococcus aureus)     right leg & right leg inner thigh    Neck pain     Obesity     Sleep disturbance          PHQ-9 Depression Screening    PHQ-9:   Frequency of the following problems over the past two weeks:      Little interest or pleasure in doing things: 0 - not at all  Feeling down, depressed, or hopeless: 0 - not at all  PHQ-2 Score: 0           Current Outpatient Medications   Medication Sig Dispense Refill    escitalopram (LEXAPRO) 10 mg tablet Take 1 tablet (10 mg total) by mouth daily 90 tablet 3    fluticasone (FLONASE) 50 mcg/act nasal spray 2 sprays into each nostril daily 3 Bottle 3    levocetirizine (XYZAL) 5 MG tablet Take 1 tablet (5 mg total) by mouth every evening 90 tablet 3    lisinopril (ZESTRIL) 20 mg tablet TAKE 1 TABLET DAILY 90 tablet 1    Synthroid 200 MCG tablet Take 1 tablet (200 mcg total) by mouth daily BRAND NAME ONLY SYNTHROID- add to 50mcg for total of 250mcg daily 90 tablet 1    mupirocin (BACTROBAN) 2 % ointment Put into both nostrils at bedtime with a Qtip x 3 months 30 g 2     No current facility-administered medications for this visit        Allergies Allergen Reactions    Other      seasonal    Vancomycin      Immunization History   Administered Date(s) Administered    Hep B, adult 02/05/2008, 03/04/2008    INFLUENZA 09/04/2015    Influenza, injectable, quadrivalent, preservative free 0 5 mL 11/02/2020    Tdap 04/23/2008, 05/07/2021       Patient Care Team:  Andrey Summers as PCP - General (Family Medicine)  Laney Del Toro MD as PCP - [address] [place], [place] [address] [facility]    Review of Systems   Constitutional: Negative for activity change, diaphoresis, fatigue and fever  HENT: Positive for congestion (chronic)  Negative for facial swelling, hearing loss, rhinorrhea, sinus pressure, sinus pain, sneezing, sore throat and voice change  Eyes: Negative for discharge and visual disturbance  Respiratory: Negative for cough, choking, chest tightness, shortness of breath, wheezing and stridor  Cardiovascular: Negative for chest pain, palpitations and leg swelling  Gastrointestinal: Negative for abdominal distention, abdominal pain, constipation, diarrhea, nausea and vomiting  Endocrine: Negative for polydipsia, polyphagia and polyuria  Genitourinary: Negative for difficulty urinating, dysuria, frequency and urgency  Musculoskeletal: Negative for arthralgias, back pain, gait problem, joint swelling, myalgias, neck pain and neck stiffness  Skin: Positive for wound  Negative for color change and rash  Neurological: Negative for dizziness, syncope, speech difficulty, weakness, light-headedness and headaches  Hematological: Negative for adenopathy  Does not bruise/bleed easily  Psychiatric/Behavioral: Negative for agitation, behavioral problems, confusion, hallucinations, sleep disturbance and suicidal ideas  The patient is not nervous/anxious  Physical Exam :  Physical Exam  Vitals and nursing note reviewed  Constitutional:       General: She is not in acute distress  Appearance: She is well-developed  She is obese   She is not diaphoretic  Neck:      Thyroid: No thyromegaly  Trachea: No tracheal deviation  Cardiovascular:      Rate and Rhythm: Normal rate and regular rhythm  Heart sounds: Normal heart sounds  No murmur heard  Pulmonary:      Effort: Pulmonary effort is normal  No respiratory distress  Breath sounds: Normal breath sounds  No wheezing  Musculoskeletal:         General: No tenderness or deformity  Normal range of motion  Cervical back: Normal range of motion and neck supple  Right lower leg: No edema  Left lower leg: No edema  Skin:     General: Skin is warm and dry  Findings: No erythema or rash  Comments: See acute note   Neurological:      Mental Status: She is alert and oriented to person, place, and time  Psychiatric:         Mood and Affect: Mood normal          Behavior: Behavior normal  Behavior is cooperative  Thought Content: Thought content normal          Judgment: Judgment normal            Reviewed Updated [facility] Prior Wellness Visits:   Last Health Maintenance visit information was reviewed, patient interviewed , no change since last HM visit no  Last HM visit information was reviewed, patient interviewed and updates made to the record today no    Assessment and Plan:  1  Annual physical exam     2  MRSA infection (methicillin-resistant Staphylococcus aureus)  mupirocin (BACTROBAN) 2 % ointment   3  Mild intermittent asthma without complication     4  Acquired hypothyroidism  TSH, 3rd generation with Free T4 reflex   5  Anxiety     6  Essential hypertension  Comprehensive metabolic panel   7  Screening cholesterol level  Lipid panel   8  Prediabetes  HEMOGLOBIN A1C W/ EAG ESTIMATION   9   Class 3 severe obesity due to excess calories with serious comorbidity and body mass index (BMI) of 45 0 to 49 9 in adult [facility])          Health Maintenance Due   Topic Date Due    Pneumococcal Vaccine: Pediatrics (0 to 5 Years) and At-Risk Patients (6 to 59 Years) (1 of 1 - PPSV23) Never done    COVID-19 Vaccine (1) Never done    HIV Screening  Never done    Cervical Cancer Screening  Never done    Annual Physical  05/18/2021

## 2021-06-15 ENCOUNTER — OFFICE VISIT (OUTPATIENT)
Dept: WOUND CARE | Facility: CLINIC | Age: 36
End: 2021-06-15
Payer: COMMERCIAL

## 2021-06-15 VITALS
TEMPERATURE: 97.2 F | RESPIRATION RATE: 20 BRPM | SYSTOLIC BLOOD PRESSURE: 147 MMHG | HEART RATE: 71 BPM | DIASTOLIC BLOOD PRESSURE: 95 MMHG

## 2021-06-15 DIAGNOSIS — S41.101A WOUND OF RIGHT UPPER EXTREMITY, INITIAL ENCOUNTER: Primary | ICD-10-CM

## 2021-06-15 PROCEDURE — 99212 OFFICE O/P EST SF 10 MIN: CPT | Performed by: FAMILY MEDICINE

## 2021-06-15 PROCEDURE — G0463 HOSPITAL OUTPT CLINIC VISIT: HCPCS | Performed by: FAMILY MEDICINE

## 2021-06-15 NOTE — PROGRESS NOTES
Patient ID: Anil Corral is a 43 y o  female Date of Birth 1985       Chief Complaint   Patient presents with    Follow Up Wound Care Visit     RUE wound  Wound open to air upon arrival         Allergies: Other and Vancomycin    Diagnosis:   Diagnosis ICD-10-CM Associated Orders   1  Wound of right upper extremity, initial encounter  S41 101A Wound cleansing and dressings        Assessment :   Healed abscess site on the right antecubital fossa  Positive MRSA  Plan:     Discharge from wound center  Continue using mupirocin in the nose as per PCP  Subjective:     6/8/21: This is a 61-year-old female who presents to the wound center because of open wound of the right antecubital fossa  About two weeks ago she developed a painful swelling on the right  She went to urgent Care where an abscess was drained without I&D and culture was positive for MRSA  Patient has a prior history of MRSA abscess of the left thigh years ago  She was treated with Bactrim DS and the swelling has decreased dramatically but there is still a small opening  Her primary care physician has referred her to the wound center  Patient has no history of diabetes  Denies any fever or chills  6/15/21: This is a followup of MRSA abscess of the right antecubital fossa  Patient states that is stop draining for the last 3-4 days  Only slight itching noted  No fever or chills  No pain          The following portions of the patient's history were reviewed and updated as appropriate:   Patient Active Problem List   Diagnosis    Hypertension    GERD (gastroesophageal reflux disease)    Asthma    Arthritis    Class 3 severe obesity due to excess calories with serious comorbidity and body mass index (BMI) of 40 0 to 44 9 in adult [facility])    Chronic sinusitis    B12 deficiency    Iron deficiency anemia    Vitamin D deficiency    Hypothyroidism    H/O total thyroidectomy    Celiac disease    Anxiety    History of MRSA infection    Prediabetes     Past Medical History:   Diagnosis Date    Acute sinus infection     Anxiety     Asthma     Depression     Disease of thyroid gland     GERD (gastroesophageal reflux disease)     Heel spur     right    Hypertension     MRSA (methicillin resistant Staphylococcus aureus)     right leg & right leg inner thigh    Neck pain     Obesity     Sleep disturbance      Past Surgical History:   Procedure Laterality Date    BREAST BIOPSY Right     benign     SECTION      x1 live birth   Julcurtis Liming  SECTION      LEG SURGERY Right 2015    removal of MRSA    THYROIDECTOMY  2015     Family History   Problem Relation Age of Onset    Cervical cancer Mother     Hypertension Mother     Hypertension Father     Heart disease Maternal Uncle     Breast cancer Paternal Grandmother     No Known Problems Sister     No Known Problems Daughter     No Known Problems Maternal Grandmother     No Known Problems Maternal Grandfather     No Known Problems Paternal Grandfather     No Known Problems Sister     No Known Problems Daughter     No Known Problems Paternal Aunt     No Known Problems Paternal Aunt     No Known Problems Paternal Aunt     No Known Problems Paternal Aunt     No Known Problems Paternal Aunt     No Known Problems Paternal Aunt     No Known Problems Paternal Aunt      Social History     Socioeconomic History    Marital status: /Civil Union     Spouse name: None    Number of children: 2    Years of education: None    Highest education level: None   Occupational History    Occupation: employed   Tobacco Use    Smoking status: Never Smoker    Smokeless tobacco: Never Used   Vaping Use    Vaping Use: Never used   Substance and Sexual Activity    Alcohol use: Yes     Comment: socially    Drug use: No    Sexual activity: None   Other Topics Concern    None   Social History Narrative    Daily Caffeine Use- 2 cups of soda and coffee/hot tea on occasion Social Determinants of Health     Financial Resource Strain:     Difficulty of Paying Living Expenses:    Food Insecurity:     Worried About Running Out of Food in the Last Year:     [address] in the Last Year:    Transportation Needs:     Lack of Transportation (Medical):  Lack of Transportation (Non-Medical):    Physical Activity:     Days of Exercise per Week:     Minutes of Exercise per Session:    Stress:     Feeling of Stress :    Social Connections:     Frequency of Communication with Friends and Family:     Frequency of Social Gatherings with Friends and Family:     Attends [employer] Services:     Active Member of Clubs or Organizations:     Attends Club or Organization Meetings:     Marital Status:    Intimate Partner Violence:     Fear of Current or Ex-Partner:     Emotionally Abused:     Physically Abused:     Sexually Abused:        Current Outpatient Medications:     escitalopram (LEXAPRO) 10 mg tablet, Take 1 tablet (10 mg total) by mouth daily, Disp: 90 tablet, Rfl: 3    fluticasone (FLONASE) 50 mcg/act nasal spray, 2 sprays into each nostril daily, Disp: 3 Bottle, Rfl: 3    levocetirizine (XYZAL) 5 MG tablet, Take 1 tablet (5 mg total) by mouth every evening, Disp: 90 tablet, Rfl: 3    lisinopril (ZESTRIL) 20 mg tablet, TAKE 1 TABLET DAILY, Disp: 90 tablet, Rfl: 1    mupirocin (BACTROBAN) 2 % ointment, Put into both nostrils at bedtime with a Qtip x 3 months, Disp: 30 g, Rfl: 2    Synthroid 200 MCG tablet, Take 1 tablet (200 mcg total) by mouth daily BRAND NAME ONLY SYNTHROID- add to 50mcg for total of 250mcg daily, Disp: 90 tablet, Rfl: 1    Review of Systems   Constitutional: Negative for appetite change, chills, fatigue, fever and unexpected weight change  HENT: Negative for congestion, hearing loss, postnasal drip and sinus pressure  Eyes: Negative for discharge and visual disturbance  Respiratory: Negative for cough and shortness of breath  Cardiovascular: Negative for chest pain, palpitations and leg swelling  Gastrointestinal: Negative for abdominal pain (Only one related to see celiac disease), blood in stool, constipation, diarrhea and nausea  Endocrine: Negative  Genitourinary: Negative for difficulty urinating, dysuria and urgency  Musculoskeletal: Negative for back pain and gait problem  Skin: Positive for wound (Right antecubital fossa)  Negative for rash  Allergic/Immunologic: Negative  Neurological: Negative for dizziness, tremors, seizures, weakness, numbness and headaches  Hematological: Does not bruise/bleed easily  Psychiatric/Behavioral: Positive for dysphoric mood  The patient is nervous/anxious  Objective:  /95   Pulse 71   Temp (!) 97 2 °F (36 2 °C)   Resp 20   Pain Score: 0-No pain     Physical Exam  Vitals and nursing note reviewed  Constitutional:       Appearance: Normal appearance  She is well-developed  She is obese  HENT:      Head: Normocephalic and atraumatic  Right Ear: External ear normal       Left Ear: External ear normal       Mouth/Throat:      Comments: masked  Eyes:      General: Lids are normal          Right eye: No discharge  Left eye: No discharge  Conjunctiva/sclera: Conjunctivae normal       Pupils: Pupils are equal, round, and reactive to light  Cardiovascular:      Heart sounds: No murmur heard  No friction rub  No gallop  Pulmonary:      Effort: Pulmonary effort is normal       Breath sounds: Normal air entry  Abdominal:      Palpations: There is no hepatomegaly or splenomegaly  Tenderness: There is no abdominal tenderness  There is no guarding or rebound  Musculoskeletal:      Cervical back: Neck supple  Right lower leg: No edema  Left lower leg: No edema  Lymphadenopathy:      Cervical: No cervical adenopathy  Skin:     General: Skin is warm and dry  Findings: No erythema or wound               Comments: Indurated area consistent with scar tissue  Slight erythema  No fluctuance  Nontender  Neurological:      Mental Status: She is alert and oriented to person, place, and time  Gait: Gait is intact  Psychiatric:         Attention and Perception: Attention normal          Mood and Affect: Affect normal  Mood is anxious  Speech: Speech normal          Behavior: Behavior is cooperative  Thought Content: Thought content normal          Cognition and Memory: Cognition normal          Wound 06/08/21  Arm Right; Inner (Active)   Wound Description Epithelialization 06/15/21 0800   Gi-wound Assessment Pink 06/15/21 0800   Wound Length (cm) 0 cm 06/15/21 0800   Wound Width (cm) 0 cm 06/15/21 0800   Wound Depth (cm) 0 cm 06/15/21 0800   Wound Surface Area (cm^2) 0 cm^2 06/15/21 0800   Wound Volume (cm^3) 0 cm^3 06/15/21 0800   Calculated Wound Volume (cm^3) 0 cm^3 06/15/21 0800   Change in Wound Size % 100 06/15/21 0800   Drainage Amount None 06/15/21 0800           Results from last 6 Months   Lab Units 05/26/21  0937   WOUND CULTURE  1+ Growth of Methicillin Resistant Staphylococcus aureus*         Wound Instructions:  Orders Placed This Encounter   Procedures    Wound cleansing and dressings     Standing Status:   Future     Standing Expiration Date:   6/15/2022       Sage Cota MD, CHT, CWS       Portions of the record may have been created with voice recognition software  Occasional wrong word or "sound alike" substitutions may have occurred due to the inherent limitations of voice recognition software  Read the chart carefully and recognize, using context, where substitutions have occurred

## 2021-06-15 NOTE — PATIENT INSTRUCTIONS
Orders Placed This Encounter   Procedures    Wound cleansing and dressings     Standing Status:   Future     Standing Expiration Date:   6/15/2022

## 2021-08-09 ENCOUNTER — APPOINTMENT (OUTPATIENT)
Dept: LAB | Facility: CLINIC | Age: 36
End: 2021-08-09
Payer: COMMERCIAL

## 2021-08-09 DIAGNOSIS — I10 ESSENTIAL HYPERTENSION: ICD-10-CM

## 2021-08-09 DIAGNOSIS — Z13.220 SCREENING CHOLESTEROL LEVEL: ICD-10-CM

## 2021-08-09 DIAGNOSIS — E03.9 ACQUIRED HYPOTHYROIDISM: ICD-10-CM

## 2021-08-09 DIAGNOSIS — R73.03 PREDIABETES: ICD-10-CM

## 2021-08-09 LAB
ALBUMIN SERPL BCP-MCNC: 3.2 G/DL (ref 3.5–5)
ALP SERPL-CCNC: 90 U/L (ref 46–116)
ALT SERPL W P-5'-P-CCNC: 18 U/L (ref 12–78)
ANION GAP SERPL CALCULATED.3IONS-SCNC: 6 MMOL/L (ref 4–13)
AST SERPL W P-5'-P-CCNC: 13 U/L (ref 5–45)
BILIRUB SERPL-MCNC: 0.26 MG/DL (ref 0.2–1)
BUN SERPL-MCNC: 11 MG/DL (ref 5–25)
CALCIUM ALBUM COR SERPL-MCNC: 9.1 MG/DL (ref 8.3–10.1)
CALCIUM SERPL-MCNC: 8.5 MG/DL (ref 8.3–10.1)
CHLORIDE SERPL-SCNC: 108 MMOL/L (ref 100–108)
CHOLEST SERPL-MCNC: 198 MG/DL (ref 50–200)
CO2 SERPL-SCNC: 25 MMOL/L (ref 21–32)
CREAT SERPL-MCNC: 0.64 MG/DL (ref 0.6–1.3)
EST. AVERAGE GLUCOSE BLD GHB EST-MCNC: 128 MG/DL
GFR SERPL CREATININE-BSD FRML MDRD: 114 ML/MIN/1.73SQ M
GLUCOSE P FAST SERPL-MCNC: 116 MG/DL (ref 65–99)
HBA1C MFR BLD: 6.1 %
HDLC SERPL-MCNC: 41 MG/DL
LDLC SERPL CALC-MCNC: 130 MG/DL (ref 0–100)
NONHDLC SERPL-MCNC: 157 MG/DL
POTASSIUM SERPL-SCNC: 4 MMOL/L (ref 3.5–5.3)
PROT SERPL-MCNC: 7.9 G/DL (ref 6.4–8.2)
SODIUM SERPL-SCNC: 139 MMOL/L (ref 136–145)
T4 FREE SERPL-MCNC: 1.04 NG/DL (ref 0.76–1.46)
TRIGL SERPL-MCNC: 136 MG/DL
TSH SERPL DL<=0.05 MIU/L-ACNC: 0.08 UIU/ML (ref 0.36–3.74)

## 2021-08-09 PROCEDURE — 80061 LIPID PANEL: CPT

## 2021-08-09 PROCEDURE — 84443 ASSAY THYROID STIM HORMONE: CPT

## 2021-08-09 PROCEDURE — 36415 COLL VENOUS BLD VENIPUNCTURE: CPT

## 2021-08-09 PROCEDURE — 80053 COMPREHEN METABOLIC PANEL: CPT

## 2021-08-09 PROCEDURE — 84439 ASSAY OF FREE THYROXINE: CPT

## 2021-08-09 PROCEDURE — 83036 HEMOGLOBIN GLYCOSYLATED A1C: CPT

## 2021-08-22 DIAGNOSIS — E03.9 HYPOTHYROIDISM, UNSPECIFIED TYPE: ICD-10-CM

## 2021-08-23 RX ORDER — LEVOTHYROXINE SODIUM 200 MCG
TABLET ORAL
Qty: 90 TABLET | Refills: 3 | Status: SHIPPED | OUTPATIENT
Start: 2021-08-23

## 2021-10-21 ENCOUNTER — CLINICAL SUPPORT (OUTPATIENT)
Dept: FAMILY MEDICINE CLINIC | Facility: CLINIC | Age: 36
End: 2021-10-21
Payer: COMMERCIAL

## 2021-10-21 DIAGNOSIS — Z23 ENCOUNTER FOR IMMUNIZATION: Primary | ICD-10-CM

## 2021-10-21 PROCEDURE — 90471 IMMUNIZATION ADMIN: CPT

## 2021-10-21 PROCEDURE — 90686 IIV4 VACC NO PRSV 0.5 ML IM: CPT

## 2021-10-25 DIAGNOSIS — I10 HYPERTENSION, UNSPECIFIED TYPE: ICD-10-CM

## 2021-10-25 RX ORDER — LISINOPRIL 20 MG/1
TABLET ORAL
Qty: 90 TABLET | Refills: 1 | Status: SHIPPED | OUTPATIENT
Start: 2021-10-25 | End: 2022-03-21

## 2021-11-15 ENCOUNTER — OFFICE VISIT (OUTPATIENT)
Dept: URGENT CARE | Facility: CLINIC | Age: 36
End: 2021-11-15
Payer: COMMERCIAL

## 2021-11-15 VITALS
TEMPERATURE: 98 F | RESPIRATION RATE: 16 BRPM | SYSTOLIC BLOOD PRESSURE: 122 MMHG | OXYGEN SATURATION: 99 % | HEART RATE: 104 BPM | DIASTOLIC BLOOD PRESSURE: 79 MMHG

## 2021-11-15 DIAGNOSIS — K04.7 DENTAL ABSCESS: Primary | ICD-10-CM

## 2021-11-15 PROCEDURE — G0383 LEV 4 HOSP TYPE B ED VISIT: HCPCS | Performed by: PHYSICIAN ASSISTANT

## 2021-11-15 RX ORDER — AMOXICILLIN 500 MG/1
500 CAPSULE ORAL EVERY 8 HOURS SCHEDULED
Qty: 22 CAPSULE | Refills: 0 | Status: SHIPPED | OUTPATIENT
Start: 2021-11-15 | End: 2021-11-22

## 2021-12-16 ENCOUNTER — OFFICE VISIT (OUTPATIENT)
Dept: FAMILY MEDICINE CLINIC | Facility: CLINIC | Age: 36
End: 2021-12-16
Payer: COMMERCIAL

## 2021-12-16 VITALS
BODY MASS INDEX: 45 KG/M2 | RESPIRATION RATE: 20 BRPM | DIASTOLIC BLOOD PRESSURE: 78 MMHG | HEIGHT: 66 IN | SYSTOLIC BLOOD PRESSURE: 120 MMHG | WEIGHT: 280 LBS | TEMPERATURE: 98.7 F | OXYGEN SATURATION: 98 % | HEART RATE: 73 BPM

## 2021-12-16 DIAGNOSIS — Z13.220 SCREENING CHOLESTEROL LEVEL: ICD-10-CM

## 2021-12-16 DIAGNOSIS — I10 ESSENTIAL HYPERTENSION: ICD-10-CM

## 2021-12-16 DIAGNOSIS — R73.03 PREDIABETES: ICD-10-CM

## 2021-12-16 DIAGNOSIS — J30.1 SEASONAL ALLERGIC RHINITIS DUE TO POLLEN: ICD-10-CM

## 2021-12-16 DIAGNOSIS — E03.9 ACQUIRED HYPOTHYROIDISM: Primary | ICD-10-CM

## 2021-12-16 DIAGNOSIS — Z12.31 SCREENING MAMMOGRAM FOR BREAST CANCER: ICD-10-CM

## 2021-12-16 DIAGNOSIS — F41.9 ANXIETY: ICD-10-CM

## 2021-12-16 DIAGNOSIS — Z12.4 SCREENING FOR CERVICAL CANCER: ICD-10-CM

## 2021-12-16 DIAGNOSIS — D50.8 OTHER IRON DEFICIENCY ANEMIA: ICD-10-CM

## 2021-12-16 DIAGNOSIS — J30.9 ALLERGIC RHINITIS WITH POSTNASAL DRIP: ICD-10-CM

## 2021-12-16 DIAGNOSIS — R09.82 ALLERGIC RHINITIS WITH POSTNASAL DRIP: ICD-10-CM

## 2021-12-16 PROCEDURE — 99214 OFFICE O/P EST MOD 30 MIN: CPT | Performed by: NURSE PRACTITIONER

## 2021-12-16 PROCEDURE — 1036F TOBACCO NON-USER: CPT | Performed by: NURSE PRACTITIONER

## 2021-12-16 PROCEDURE — 3078F DIAST BP <80 MM HG: CPT | Performed by: NURSE PRACTITIONER

## 2021-12-16 PROCEDURE — 3074F SYST BP LT 130 MM HG: CPT | Performed by: NURSE PRACTITIONER

## 2021-12-16 PROCEDURE — 3725F SCREEN DEPRESSION PERFORMED: CPT | Performed by: NURSE PRACTITIONER

## 2021-12-16 PROCEDURE — 3008F BODY MASS INDEX DOCD: CPT | Performed by: NURSE PRACTITIONER

## 2021-12-16 RX ORDER — LEVOCETIRIZINE DIHYDROCHLORIDE 5 MG/1
5 TABLET, FILM COATED ORAL EVERY EVENING
Qty: 90 TABLET | Refills: 3 | Status: SHIPPED | OUTPATIENT
Start: 2021-12-16

## 2021-12-16 RX ORDER — ESCITALOPRAM OXALATE 20 MG/1
20 TABLET ORAL DAILY
Qty: 90 TABLET | Refills: 3 | Status: SHIPPED | OUTPATIENT
Start: 2021-12-16

## 2021-12-16 RX ORDER — FLUTICASONE PROPIONATE 50 MCG
2 SPRAY, SUSPENSION (ML) NASAL DAILY
Qty: 48 G | Refills: 3 | Status: SHIPPED | OUTPATIENT
Start: 2021-12-16

## 2021-12-20 ENCOUNTER — APPOINTMENT (OUTPATIENT)
Dept: LAB | Facility: CLINIC | Age: 36
End: 2021-12-20
Payer: COMMERCIAL

## 2021-12-20 DIAGNOSIS — E03.9 ACQUIRED HYPOTHYROIDISM: ICD-10-CM

## 2021-12-20 DIAGNOSIS — I10 ESSENTIAL HYPERTENSION: ICD-10-CM

## 2021-12-20 DIAGNOSIS — R73.03 PREDIABETES: ICD-10-CM

## 2021-12-20 DIAGNOSIS — D50.8 OTHER IRON DEFICIENCY ANEMIA: ICD-10-CM

## 2021-12-20 DIAGNOSIS — Z13.220 SCREENING CHOLESTEROL LEVEL: ICD-10-CM

## 2021-12-20 LAB
ALBUMIN SERPL BCP-MCNC: 3.8 G/DL (ref 3.5–5)
ALP SERPL-CCNC: 86 U/L (ref 46–116)
ALT SERPL W P-5'-P-CCNC: 16 U/L (ref 12–78)
ANION GAP SERPL CALCULATED.3IONS-SCNC: 5 MMOL/L (ref 4–13)
AST SERPL W P-5'-P-CCNC: 9 U/L (ref 5–45)
BASOPHILS # BLD AUTO: 0.07 THOUSANDS/ΜL (ref 0–0.1)
BASOPHILS NFR BLD AUTO: 1 % (ref 0–1)
BILIRUB SERPL-MCNC: 0.48 MG/DL (ref 0.2–1)
BUN SERPL-MCNC: 12 MG/DL (ref 5–25)
CALCIUM SERPL-MCNC: 8.7 MG/DL (ref 8.3–10.1)
CHLORIDE SERPL-SCNC: 109 MMOL/L (ref 100–108)
CHOLEST SERPL-MCNC: 212 MG/DL
CO2 SERPL-SCNC: 26 MMOL/L (ref 21–32)
CREAT SERPL-MCNC: 0.77 MG/DL (ref 0.6–1.3)
EOSINOPHIL # BLD AUTO: 0.21 THOUSAND/ΜL (ref 0–0.61)
EOSINOPHIL NFR BLD AUTO: 3 % (ref 0–6)
ERYTHROCYTE [DISTWIDTH] IN BLOOD BY AUTOMATED COUNT: 14.6 % (ref 11.6–15.1)
FERRITIN SERPL-MCNC: 19 NG/ML (ref 8–388)
GFR SERPL CREATININE-BSD FRML MDRD: 98 ML/MIN/1.73SQ M
GLUCOSE P FAST SERPL-MCNC: 108 MG/DL (ref 65–99)
HCT VFR BLD AUTO: 41.1 % (ref 34.8–46.1)
HDLC SERPL-MCNC: 42 MG/DL
HGB BLD-MCNC: 12.5 G/DL (ref 11.5–15.4)
IMM GRANULOCYTES # BLD AUTO: 0.03 THOUSAND/UL (ref 0–0.2)
IMM GRANULOCYTES NFR BLD AUTO: 0 % (ref 0–2)
IRON SATN MFR SERPL: 9 % (ref 15–50)
IRON SERPL-MCNC: 40 UG/DL (ref 50–170)
LDLC SERPL CALC-MCNC: 148 MG/DL (ref 0–100)
LYMPHOCYTES # BLD AUTO: 2.35 THOUSANDS/ΜL (ref 0.6–4.47)
LYMPHOCYTES NFR BLD AUTO: 32 % (ref 14–44)
MCH RBC QN AUTO: 25.6 PG (ref 26.8–34.3)
MCHC RBC AUTO-ENTMCNC: 30.4 G/DL (ref 31.4–37.4)
MCV RBC AUTO: 84 FL (ref 82–98)
MONOCYTES # BLD AUTO: 0.37 THOUSAND/ΜL (ref 0.17–1.22)
MONOCYTES NFR BLD AUTO: 5 % (ref 4–12)
NEUTROPHILS # BLD AUTO: 4.36 THOUSANDS/ΜL (ref 1.85–7.62)
NEUTS SEG NFR BLD AUTO: 59 % (ref 43–75)
NONHDLC SERPL-MCNC: 170 MG/DL
NRBC BLD AUTO-RTO: 0 /100 WBCS
PLATELET # BLD AUTO: 319 THOUSANDS/UL (ref 149–390)
PMV BLD AUTO: 10 FL (ref 8.9–12.7)
POTASSIUM SERPL-SCNC: 4.2 MMOL/L (ref 3.5–5.3)
PROT SERPL-MCNC: 8.5 G/DL (ref 6.4–8.2)
RBC # BLD AUTO: 4.88 MILLION/UL (ref 3.81–5.12)
SODIUM SERPL-SCNC: 140 MMOL/L (ref 136–145)
TIBC SERPL-MCNC: 426 UG/DL (ref 250–450)
TRIGL SERPL-MCNC: 112 MG/DL
TSH SERPL DL<=0.05 MIU/L-ACNC: 1.16 UIU/ML (ref 0.36–3.74)
WBC # BLD AUTO: 7.39 THOUSAND/UL (ref 4.31–10.16)

## 2021-12-20 PROCEDURE — 84443 ASSAY THYROID STIM HORMONE: CPT

## 2021-12-20 PROCEDURE — 36415 COLL VENOUS BLD VENIPUNCTURE: CPT

## 2021-12-20 PROCEDURE — 83540 ASSAY OF IRON: CPT

## 2021-12-20 PROCEDURE — 83550 IRON BINDING TEST: CPT

## 2021-12-20 PROCEDURE — 83036 HEMOGLOBIN GLYCOSYLATED A1C: CPT

## 2021-12-20 PROCEDURE — 82728 ASSAY OF FERRITIN: CPT

## 2021-12-20 PROCEDURE — 80061 LIPID PANEL: CPT

## 2021-12-20 PROCEDURE — 80053 COMPREHEN METABOLIC PANEL: CPT

## 2021-12-20 PROCEDURE — 85025 COMPLETE CBC W/AUTO DIFF WBC: CPT

## 2021-12-21 DIAGNOSIS — D50.8 OTHER IRON DEFICIENCY ANEMIA: Primary | ICD-10-CM

## 2021-12-21 LAB
EST. AVERAGE GLUCOSE BLD GHB EST-MCNC: 123 MG/DL
HBA1C MFR BLD: 5.9 %

## 2021-12-22 ENCOUNTER — TELEPHONE (OUTPATIENT)
Dept: HEMATOLOGY ONCOLOGY | Facility: CLINIC | Age: 36
End: 2021-12-22

## 2021-12-29 ENCOUNTER — TELEPHONE (OUTPATIENT)
Dept: HEMATOLOGY ONCOLOGY | Facility: CLINIC | Age: 36
End: 2021-12-29

## 2022-01-20 ENCOUNTER — TELEPHONE (OUTPATIENT)
Dept: HEMATOLOGY ONCOLOGY | Facility: CLINIC | Age: 37
End: 2022-01-20

## 2022-02-22 DIAGNOSIS — R12 HEARTBURN: Primary | ICD-10-CM

## 2022-02-22 RX ORDER — OMEPRAZOLE 20 MG/1
20 CAPSULE, DELAYED RELEASE ORAL
Qty: 90 CAPSULE | Refills: 3 | Status: SHIPPED | OUTPATIENT
Start: 2022-02-22 | End: 2022-05-23 | Stop reason: SDUPTHER

## 2022-03-21 DIAGNOSIS — I10 HYPERTENSION, UNSPECIFIED TYPE: ICD-10-CM

## 2022-03-21 RX ORDER — LISINOPRIL 20 MG/1
TABLET ORAL
Qty: 90 TABLET | Refills: 1 | Status: SHIPPED | OUTPATIENT
Start: 2022-03-21 | End: 2022-05-21 | Stop reason: SDUPTHER

## 2022-03-21 NOTE — TELEPHONE ENCOUNTER
Patient requesting refill(s) of: lisinopril 20 mg daily    Last filled: 10/25/2021 #90 x 1  Last appt:12/16/2021  Next appt:6/13/2022  Pharmacy: [address] [place] [address]

## 2022-05-23 ENCOUNTER — OFFICE VISIT (OUTPATIENT)
Dept: FAMILY MEDICINE CLINIC | Facility: CLINIC | Age: 37
End: 2022-05-23
Payer: COMMERCIAL

## 2022-05-23 VITALS
BODY MASS INDEX: 47.09 KG/M2 | TEMPERATURE: 97.5 F | WEIGHT: 293 LBS | HEIGHT: 66 IN | RESPIRATION RATE: 17 BRPM | DIASTOLIC BLOOD PRESSURE: 88 MMHG | OXYGEN SATURATION: 99 % | SYSTOLIC BLOOD PRESSURE: 138 MMHG | HEART RATE: 86 BPM

## 2022-05-23 DIAGNOSIS — K21.00 GASTROESOPHAGEAL REFLUX DISEASE WITH ESOPHAGITIS WITHOUT HEMORRHAGE: ICD-10-CM

## 2022-05-23 DIAGNOSIS — A49.02 MRSA INFECTION (METHICILLIN-RESISTANT STAPHYLOCOCCUS AUREUS): Primary | ICD-10-CM

## 2022-05-23 DIAGNOSIS — E66.01 CLASS 3 SEVERE OBESITY DUE TO EXCESS CALORIES WITH SERIOUS COMORBIDITY AND BODY MASS INDEX (BMI) OF 45.0 TO 49.9 IN ADULT (HCC): ICD-10-CM

## 2022-05-23 PROCEDURE — 99213 OFFICE O/P EST LOW 20 MIN: CPT | Performed by: NURSE PRACTITIONER

## 2022-05-23 PROCEDURE — 1036F TOBACCO NON-USER: CPT | Performed by: NURSE PRACTITIONER

## 2022-05-23 PROCEDURE — 3725F SCREEN DEPRESSION PERFORMED: CPT | Performed by: NURSE PRACTITIONER

## 2022-05-23 PROCEDURE — 3075F SYST BP GE 130 - 139MM HG: CPT | Performed by: NURSE PRACTITIONER

## 2022-05-23 PROCEDURE — 3008F BODY MASS INDEX DOCD: CPT | Performed by: NURSE PRACTITIONER

## 2022-05-23 PROCEDURE — 3079F DIAST BP 80-89 MM HG: CPT | Performed by: NURSE PRACTITIONER

## 2022-05-23 RX ORDER — OMEPRAZOLE 20 MG/1
20 CAPSULE, DELAYED RELEASE ORAL
Qty: 90 CAPSULE | Refills: 3 | Status: SHIPPED | OUTPATIENT
Start: 2022-05-23

## 2022-05-23 RX ORDER — SULFAMETHOXAZOLE AND TRIMETHOPRIM 800; 160 MG/1; MG/1
1 TABLET ORAL EVERY 12 HOURS SCHEDULED
Qty: 20 TABLET | Refills: 0 | Status: SHIPPED | OUTPATIENT
Start: 2022-05-23 | End: 2022-05-31 | Stop reason: SDUPTHER

## 2022-05-23 NOTE — PATIENT INSTRUCTIONS
[employer]itnessPal rich for calorie counting  Bactroban in your nose nightly for 3 months  Bactrim DS twice daily for 10 days  Call for problems/concerns

## 2022-05-23 NOTE — PROGRESS NOTES
[facility]        NAME: Bert Goldman is a 42 y o  female  : 1985    MRN: [id number]  DATE: May 23, 2022  TIME: 8:54 AM    Assessment and Plan   MRSA infection (methicillin-resistant Staphylococcus aureus) [A49 02]  1  MRSA infection (methicillin-resistant Staphylococcus aureus)  mupirocin (BACTROBAN) 2 % ointment    sulfamethoxazole-trimethoprim (BACTRIM DS) 800-160 mg per tablet   2  Gastroesophageal reflux disease with esophagitis without hemorrhage  omeprazole (PriLOSEC) 20 mg delayed release capsule   3  Class 3 severe obesity due to excess calories with serious comorbidity and body mass index (BMI) of 45 0 to 49 9 in [facility])           Patient Instructions     Patient Instructions   [employer] rich for calorie counting  Bactroban in your nose nightly for 3 months  Bactrim DS twice daily for 10 days  Call for problems/concerns      BMI Counseling: Body mass index is 47 74 kg/m²  The BMI is above normal  Nutrition recommendations include decreasing portion sizes, encouraging healthy choices of fruits and vegetables, decreasing fast food intake, consuming healthier snacks, limiting drinks that contain sugar, moderation in carbohydrate intake and increasing intake of lean protein  Exercise recommendations include exercising 3-5 times per week  Rationale for BMI follow-up plan is due to patient being overweight or obese  Depression Screening and Follow-up Plan: Patient was screened for depression during today's encounter  They screened negative with a PHQ-2 score of 0  Chief Complaint     Chief Complaint   Patient presents with    Mass     On stomach          History of Present Illness       Here for abscess on her right abdomen- noticed it on Saturday  She has not been seen at [facility] or ED  She has had multiple MRSA abscesses in the past  Did the Bactroban in the nose about 18 months ago         Review of Systems   Review of Systems   Constitutional: Positive for unexpected weight change (has cut out soda and gained 15 pounds- discussed calorie counting)  Negative for activity change, diaphoresis, fatigue and fever  HENT: Negative for congestion, facial swelling, hearing loss, rhinorrhea, sinus pressure, sinus pain, sneezing, sore throat and voice change  Eyes: Negative for discharge and visual disturbance  Respiratory: Negative for cough, choking, chest tightness, shortness of breath, wheezing and stridor  Cardiovascular: Negative for chest pain, palpitations and leg swelling  Gastrointestinal: Negative for abdominal distention, abdominal pain, constipation, diarrhea, nausea and vomiting  Endocrine: Negative for polydipsia, polyphagia and polyuria  Genitourinary: Negative for difficulty urinating, dysuria, frequency and urgency  Musculoskeletal: Negative for arthralgias, back pain, gait problem, joint swelling, myalgias, neck pain and neck stiffness  Skin: Positive for wound  Negative for color change and rash  Neurological: Negative for dizziness, syncope, speech difficulty, weakness, light-headedness and headaches  Hematological: Negative for adenopathy  Does not bruise/bleed easily  Psychiatric/Behavioral: Negative for agitation, behavioral problems, confusion, hallucinations, sleep disturbance and suicidal ideas  The patient is not nervous/anxious            Current Medications       Current Outpatient Medications:     escitalopram (LEXAPRO) 20 mg tablet, Take 1 tablet (20 mg total) by mouth daily, Disp: 90 tablet, Rfl: 3    fluticasone (FLONASE) 50 mcg/act nasal spray, 2 sprays into each nostril daily, Disp: 48 g, Rfl: 3    levocetirizine (XYZAL) 5 MG tablet, Take 1 tablet (5 mg total) by mouth every evening, Disp: 90 tablet, Rfl: 3    lisinopril (ZESTRIL) 20 mg tablet, Take 1 tablet (20 mg total) by mouth in the morning , Disp: 90 tablet, Rfl: 3    mupirocin (BACTROBAN) 2 % ointment, Put into both nostrils at bedtime with a Qtip x 3 months, Disp: 30 g, Rfl: 2    omeprazole (PriLOSEC) 20 mg delayed release capsule, Take 1 capsule (20 mg total) by mouth daily before breakfast, Disp: 90 capsule, Rfl: 3    sulfamethoxazole-trimethoprim (BACTRIM DS) 800-160 mg per tablet, Take 1 tablet by mouth every 12 (twelve) hours for 10 days, Disp: 20 tablet, Rfl: 0    Synthroid 200 MCG tablet, TAKE 1 TABLET DAILY, ADD [id number] FOR TOTAL OF 225MCG  DAILY, Disp: 90 tablet, Rfl: 3    Current Allergies     Allergies as of 2022 - Reviewed 2022   Allergen Reaction Noted    Other  08/15/2018    Vancomycin  08/15/2018            The following portions of the patient's history were reviewed and updated as appropriate: allergies, current medications, past family history, past medical history, past social history, past surgical history and problem list      Past Medical History:   Diagnosis Date    Acute sinus infection     Anxiety     Asthma     Depression     Disease of thyroid gland     GERD (gastroesophageal reflux disease)     Heel spur     right    Hypertension     MRSA (methicillin resistant Staphylococcus aureus)     right leg & right leg inner thigh    Neck pain     Obesity     Sleep disturbance        Past Surgical History:   Procedure Laterality Date    BREAST BIOPSY Right     benign     SECTION      x1 live birth   Emaline Folds  SECTION      LEG SURGERY Right     removal of MRSA    THYROIDECTOMY         Family History   Problem Relation Age of Onset    Cervical cancer Mother     Hypertension Mother     Hypertension Father     Heart disease Maternal Uncle     Breast cancer Paternal Grandmother     No Known Problems Sister     No Known Problems Daughter     No Known Problems Maternal Grandmother     No Known Problems Maternal Grandfather     No Known Problems Paternal Grandfather     No Known Problems Sister     No Known Problems Daughter     No Known Problems Paternal Aunt     No Known Problems Paternal Aunt     No Known Problems Paternal Aunt     No Known Problems Paternal Aunt     No Known Problems Paternal Aunt     No Known Problems Paternal Aunt     No Known Problems Paternal Aunt          Medications have been verified  Objective   /88 (BP Location: Left arm, Patient Position: Sitting, Cuff Size: Large)   Pulse 86   Temp 97 5 °F (36 4 °C)   Resp 17   Ht 5' 6" (1 676 m)   Wt 134 kg (295 lb 12 8 oz)   LMP 05/10/2022   SpO2 99%   BMI 47 74 kg/m²        Physical Exam     Physical Exam  Vitals and nursing note reviewed  Constitutional:       General: She is not in acute distress  Appearance: She is well-developed  She is not diaphoretic  Neck:      Trachea: No tracheal deviation  Cardiovascular:      Rate and Rhythm: Normal rate and regular rhythm  Heart sounds: Normal heart sounds  No murmur heard  Pulmonary:      Effort: Pulmonary effort is normal  No respiratory distress  Breath sounds: Normal breath sounds  No wheezing  Musculoskeletal:         General: No tenderness or deformity  Normal range of motion  Cervical back: Normal range of motion  Skin:     General: Skin is warm and dry  Findings: Erythema present  No rash  Neurological:      Mental Status: She is alert and oriented to person, place, and time  Psychiatric:         Mood and Affect: Mood normal          Behavior: Behavior normal  Behavior is cooperative  Thought Content:  Thought content normal          Judgment: Judgment normal

## 2022-05-31 DIAGNOSIS — A49.02 MRSA INFECTION (METHICILLIN-RESISTANT STAPHYLOCOCCUS AUREUS): ICD-10-CM

## 2022-05-31 RX ORDER — SULFAMETHOXAZOLE AND TRIMETHOPRIM 800; 160 MG/1; MG/1
1 TABLET ORAL EVERY 12 HOURS SCHEDULED
Qty: 20 TABLET | Refills: 0 | Status: SHIPPED | OUTPATIENT
Start: 2022-05-31 | End: 2022-06-10

## 2022-06-13 ENCOUNTER — OFFICE VISIT (OUTPATIENT)
Dept: FAMILY MEDICINE CLINIC | Facility: CLINIC | Age: 37
End: 2022-06-13
Payer: COMMERCIAL

## 2022-06-13 ENCOUNTER — APPOINTMENT (OUTPATIENT)
Dept: LAB | Facility: CLINIC | Age: 37
End: 2022-06-13
Payer: COMMERCIAL

## 2022-06-13 VITALS
HEIGHT: 66 IN | TEMPERATURE: 98 F | OXYGEN SATURATION: 99 % | SYSTOLIC BLOOD PRESSURE: 120 MMHG | WEIGHT: 292 LBS | RESPIRATION RATE: 20 BRPM | DIASTOLIC BLOOD PRESSURE: 76 MMHG | HEART RATE: 77 BPM | BODY MASS INDEX: 46.93 KG/M2

## 2022-06-13 DIAGNOSIS — Z00.00 ANNUAL PHYSICAL EXAM: Primary | ICD-10-CM

## 2022-06-13 DIAGNOSIS — E66.01 CLASS 3 SEVERE OBESITY DUE TO EXCESS CALORIES WITH SERIOUS COMORBIDITY AND BODY MASS INDEX (BMI) OF 45.0 TO 49.9 IN ADULT (HCC): ICD-10-CM

## 2022-06-13 DIAGNOSIS — R73.03 PREDIABETES: ICD-10-CM

## 2022-06-13 DIAGNOSIS — E89.0 H/O TOTAL THYROIDECTOMY: ICD-10-CM

## 2022-06-13 DIAGNOSIS — J45.20 MILD INTERMITTENT ASTHMA WITHOUT COMPLICATION: ICD-10-CM

## 2022-06-13 DIAGNOSIS — I10 HYPERTENSION, UNSPECIFIED TYPE: ICD-10-CM

## 2022-06-13 DIAGNOSIS — Z13.220 SCREENING CHOLESTEROL LEVEL: ICD-10-CM

## 2022-06-13 DIAGNOSIS — J30.9 ALLERGIC RHINITIS WITH POSTNASAL DRIP: ICD-10-CM

## 2022-06-13 DIAGNOSIS — E89.0 POSTABLATIVE HYPOTHYROIDISM: ICD-10-CM

## 2022-06-13 DIAGNOSIS — R09.82 ALLERGIC RHINITIS WITH POSTNASAL DRIP: ICD-10-CM

## 2022-06-13 DIAGNOSIS — D50.8 OTHER IRON DEFICIENCY ANEMIA: ICD-10-CM

## 2022-06-13 LAB
ALBUMIN SERPL BCP-MCNC: 3.6 G/DL (ref 3.5–5)
ALP SERPL-CCNC: 83 U/L (ref 46–116)
ALT SERPL W P-5'-P-CCNC: 20 U/L (ref 12–78)
ANION GAP SERPL CALCULATED.3IONS-SCNC: 5 MMOL/L (ref 4–13)
AST SERPL W P-5'-P-CCNC: 13 U/L (ref 5–45)
BASOPHILS # BLD AUTO: 0.05 THOUSANDS/ΜL (ref 0–0.1)
BASOPHILS NFR BLD AUTO: 1 % (ref 0–1)
BILIRUB SERPL-MCNC: 0.37 MG/DL (ref 0.2–1)
BUN SERPL-MCNC: 11 MG/DL (ref 5–25)
CALCIUM SERPL-MCNC: 9.1 MG/DL (ref 8.3–10.1)
CHLORIDE SERPL-SCNC: 107 MMOL/L (ref 100–108)
CHOLEST SERPL-MCNC: 205 MG/DL
CO2 SERPL-SCNC: 26 MMOL/L (ref 21–32)
CREAT SERPL-MCNC: 0.67 MG/DL (ref 0.6–1.3)
EOSINOPHIL # BLD AUTO: 0.2 THOUSAND/ΜL (ref 0–0.61)
EOSINOPHIL NFR BLD AUTO: 3 % (ref 0–6)
ERYTHROCYTE [DISTWIDTH] IN BLOOD BY AUTOMATED COUNT: 16.1 % (ref 11.6–15.1)
EST. AVERAGE GLUCOSE BLD GHB EST-MCNC: 134 MG/DL
FERRITIN SERPL-MCNC: 11 NG/ML (ref 8–388)
GFR SERPL CREATININE-BSD FRML MDRD: 111 ML/MIN/1.73SQ M
GLUCOSE P FAST SERPL-MCNC: 110 MG/DL (ref 65–99)
HBA1C MFR BLD: 6.3 %
HCT VFR BLD AUTO: 40.3 % (ref 34.8–46.1)
HDLC SERPL-MCNC: 38 MG/DL
HGB BLD-MCNC: 11.9 G/DL (ref 11.5–15.4)
IMM GRANULOCYTES # BLD AUTO: 0.02 THOUSAND/UL (ref 0–0.2)
IMM GRANULOCYTES NFR BLD AUTO: 0 % (ref 0–2)
IRON SATN MFR SERPL: 8 % (ref 15–50)
IRON SERPL-MCNC: 36 UG/DL (ref 50–170)
LDLC SERPL CALC-MCNC: 146 MG/DL (ref 0–100)
LYMPHOCYTES # BLD AUTO: 2.01 THOUSANDS/ΜL (ref 0.6–4.47)
LYMPHOCYTES NFR BLD AUTO: 27 % (ref 14–44)
MCH RBC QN AUTO: 24.6 PG (ref 26.8–34.3)
MCHC RBC AUTO-ENTMCNC: 29.5 G/DL (ref 31.4–37.4)
MCV RBC AUTO: 83 FL (ref 82–98)
MONOCYTES # BLD AUTO: 0.4 THOUSAND/ΜL (ref 0.17–1.22)
MONOCYTES NFR BLD AUTO: 5 % (ref 4–12)
NEUTROPHILS # BLD AUTO: 4.82 THOUSANDS/ΜL (ref 1.85–7.62)
NEUTS SEG NFR BLD AUTO: 64 % (ref 43–75)
NONHDLC SERPL-MCNC: 167 MG/DL
NRBC BLD AUTO-RTO: 0 /100 WBCS
PLATELET # BLD AUTO: 342 THOUSANDS/UL (ref 149–390)
PMV BLD AUTO: 10 FL (ref 8.9–12.7)
POTASSIUM SERPL-SCNC: 4.4 MMOL/L (ref 3.5–5.3)
PROT SERPL-MCNC: 8.8 G/DL (ref 6.4–8.2)
RBC # BLD AUTO: 4.84 MILLION/UL (ref 3.81–5.12)
SODIUM SERPL-SCNC: 138 MMOL/L (ref 136–145)
TIBC SERPL-MCNC: 433 UG/DL (ref 250–450)
TRIGL SERPL-MCNC: 105 MG/DL
TSH SERPL DL<=0.05 MIU/L-ACNC: 0.59 UIU/ML (ref 0.45–4.5)
WBC # BLD AUTO: 7.5 THOUSAND/UL (ref 4.31–10.16)

## 2022-06-13 PROCEDURE — 99395 PREV VISIT EST AGE 18-39: CPT | Performed by: NURSE PRACTITIONER

## 2022-06-13 PROCEDURE — 36415 COLL VENOUS BLD VENIPUNCTURE: CPT

## 2022-06-13 PROCEDURE — 82728 ASSAY OF FERRITIN: CPT

## 2022-06-13 PROCEDURE — 83550 IRON BINDING TEST: CPT

## 2022-06-13 PROCEDURE — 3725F SCREEN DEPRESSION PERFORMED: CPT | Performed by: NURSE PRACTITIONER

## 2022-06-13 PROCEDURE — 85025 COMPLETE CBC W/AUTO DIFF WBC: CPT

## 2022-06-13 PROCEDURE — 99213 OFFICE O/P EST LOW 20 MIN: CPT | Performed by: NURSE PRACTITIONER

## 2022-06-13 PROCEDURE — 84443 ASSAY THYROID STIM HORMONE: CPT

## 2022-06-13 PROCEDURE — 3078F DIAST BP <80 MM HG: CPT | Performed by: NURSE PRACTITIONER

## 2022-06-13 PROCEDURE — 80061 LIPID PANEL: CPT

## 2022-06-13 PROCEDURE — 3074F SYST BP LT 130 MM HG: CPT | Performed by: NURSE PRACTITIONER

## 2022-06-13 PROCEDURE — 83540 ASSAY OF IRON: CPT

## 2022-06-13 PROCEDURE — 83036 HEMOGLOBIN GLYCOSYLATED A1C: CPT

## 2022-06-13 PROCEDURE — 3008F BODY MASS INDEX DOCD: CPT | Performed by: NURSE PRACTITIONER

## 2022-06-13 PROCEDURE — 80053 COMPREHEN METABOLIC PANEL: CPT

## 2022-06-13 PROCEDURE — 1036F TOBACCO NON-USER: CPT | Performed by: NURSE PRACTITIONER

## 2022-06-13 RX ORDER — ALBUTEROL SULFATE 90 UG/1
2 AEROSOL, METERED RESPIRATORY (INHALATION) EVERY 6 HOURS PRN
Qty: 18 G | Refills: 3 | Status: SHIPPED | OUTPATIENT
Start: 2022-06-13

## 2022-06-13 RX ORDER — LISINOPRIL 20 MG/1
20 TABLET ORAL DAILY
Qty: 90 TABLET | Refills: 3 | Status: SHIPPED | OUTPATIENT
Start: 2022-06-13

## 2022-06-13 RX ORDER — MONTELUKAST SODIUM 10 MG/1
10 TABLET ORAL
Qty: 90 TABLET | Refills: 3 | Status: SHIPPED | OUTPATIENT
Start: 2022-06-13

## 2022-06-13 NOTE — PATIENT INSTRUCTIONS
Start Albuterol as discussed- if using more than 3-5 times a week- call office for Steroid daily inhaler as discussed  Start Singulair as directed, continue Xyzal and Flonase  Continue other medications as directed  Get bloodwork after this visit  6 month medcheck or call sooner for problems/concerns

## 2022-06-13 NOTE — PROGRESS NOTES
[facility]        NAME: Bert Goldman is a 42 y o  female  : 1985    MRN: [id number]  DATE: 2022  TIME: 8:37 AM    Assessment and Plan   Mild intermittent asthma without complication [B08 20]  1  Mild intermittent asthma without complication  albuterol (ProAir HFA) 90 mcg/act inhaler    montelukast (SINGULAIR) 10 mg tablet   2  Hypertension, unspecified type  Comprehensive metabolic panel   3  Other iron deficiency anemia  Iron Panel (Includes Ferritin, Iron Sat%, Iron, and TIBC)    CBC and differential   4  Postablative hypothyroidism  TSH, 3rd generation with Free T4 reflex   5  Prediabetes  HEMOGLOBIN A1C W/ EAG ESTIMATION   6  Screening cholesterol level  Lipid panel   7  Allergic rhinitis with postnasal drip  montelukast (SINGULAIR) 10 mg tablet   8  H/O total thyroidectomy           Patient Instructions     Patient Instructions   Start Albuterol as discussed- if using more than 3-5 times a week- call office for Steroid daily inhaler as discussed  Start Singulair as directed, continue Xyzal and Flonase  Continue other medications as directed  Get bloodwork after this visit  6 month medcheck or call sooner for problems/concerns          Chief Complaint     Chief Complaint   Patient presents with    Follow-up         History of Present Illness       Here for 6 month medcheck-  Her MRSA boil is healing, no concerns  She is using the Bactroban in her nose every night as directed without any new MRSA infections  She is taking her medication as ordered  She is concerned that when it is humid out she is feeling chest tightness- used to be on an inhaler for her asthma but has not been on one in years  Her seasonal allergies are awful even with Xyzal and flonase  She is agreeable to Singulair  Her ENT has recommended she get her Tonsils and Adenoids out- she is anxious about this procedure but will reconsider         Review of Systems   Review of Systems   Constitutional: Negative for activity change, diaphoresis, fatigue and fever  HENT: Positive for congestion, ear pain (left) and rhinorrhea  Negative for facial swelling, hearing loss, sinus pressure, sinus pain, sneezing, sore throat and voice change  Eyes: Negative for discharge and visual disturbance  Respiratory: Positive for chest tightness and shortness of breath  Negative for cough, choking and stridor  Cardiovascular: Negative for chest pain, palpitations and leg swelling  Gastrointestinal: Negative for abdominal distention, abdominal pain, constipation, diarrhea, nausea and vomiting  Endocrine: Negative for polydipsia, polyphagia and polyuria  Genitourinary: Negative for difficulty urinating, dysuria, frequency and urgency  Musculoskeletal: Negative for arthralgias, back pain, gait problem, joint swelling, myalgias, neck pain and neck stiffness  Skin: Positive for wound (healing- improved)  Negative for color change and rash  Neurological: Negative for dizziness, syncope, speech difficulty, weakness, light-headedness and headaches  Hematological: Negative for adenopathy  Does not bruise/bleed easily  Psychiatric/Behavioral: Negative for agitation, behavioral problems, confusion, hallucinations, sleep disturbance and suicidal ideas  The patient is nervous/anxious (medication is working well)            Current Medications       Current Outpatient Medications:     albuterol (ProAir HFA) 90 mcg/act inhaler, Inhale 2 puffs every 6 (six) hours as needed for wheezing, Disp: 18 g, Rfl: 3    escitalopram (LEXAPRO) 20 mg tablet, Take 1 tablet (20 mg total) by mouth daily, Disp: 90 tablet, Rfl: 3    fluticasone (FLONASE) 50 mcg/act nasal spray, 2 sprays into each nostril daily, Disp: 48 g, Rfl: 3    levocetirizine (XYZAL) 5 MG tablet, Take 1 tablet (5 mg total) by mouth every evening, Disp: 90 tablet, Rfl: 3    lisinopril (ZESTRIL) 20 mg tablet, Take 1 tablet (20 mg total) by mouth in the morning , Disp: 90 tablet, Rfl: 3    montelukast (SINGULAIR) 10 mg tablet, Take 1 tablet (10 mg total) by mouth daily at bedtime, Disp: 90 tablet, Rfl: 3    mupirocin (BACTROBAN) 2 % ointment, Put into both nostrils at bedtime with a Qtip x 3 months, Disp: 30 g, Rfl: 2    omeprazole (PriLOSEC) 20 mg delayed release capsule, Take 1 capsule (20 mg total) by mouth daily before breakfast, Disp: 90 capsule, Rfl: 3    Synthroid 200 MCG tablet, TAKE 1 TABLET DAILY, ADD [id number] FOR TOTAL OF 225MCG  DAILY, Disp: 90 tablet, Rfl: 3    Current Allergies     Allergies as of 2022 - Reviewed 2022   Allergen Reaction Noted    Other  08/15/2018    Vancomycin  08/15/2018            The following portions of the patient's history were reviewed and updated as appropriate: allergies, current medications, past family history, past medical history, past social history, past surgical history and problem list      Past Medical History:   Diagnosis Date    Acute sinus infection     Anxiety     Asthma     Depression     Disease of thyroid gland     GERD (gastroesophageal reflux disease)     Heel spur     right    Hypertension     MRSA (methicillin resistant Staphylococcus aureus)     right leg & right leg inner thigh    Neck pain     Obesity     Sleep disturbance        Past Surgical History:   Procedure Laterality Date    BREAST BIOPSY Right     benign     SECTION      x1 live birth   Jayro Bones  SECTION      LEG SURGERY Right     removal of MRSA    THYROIDECTOMY         Family History   Problem Relation Age of Onset    Cervical cancer Mother     Hypertension Mother     Hypertension Father     Heart disease Maternal Uncle     Breast cancer Paternal Grandmother     No Known Problems Sister     No Known Problems Daughter     No Known Problems Maternal Grandmother     No Known Problems Maternal Grandfather     No Known Problems Paternal Grandfather     No Known Problems Sister     No Known Problems Daughter     No Known Problems Paternal Aunt     No Known Problems Paternal Aunt     No Known Problems Paternal Aunt     No Known Problems Paternal Aunt     No Known Problems Paternal Aunt     No Known Problems Paternal Aunt     No Known Problems Paternal Aunt          Medications have been verified  Objective   /76   Pulse 77   Temp 98 °F (36 7 °C) (Tympanic)   Resp 20   Ht 5' 6" (1 676 m)   Wt 132 kg (292 lb)   SpO2 99%   BMI 47 13 kg/m²        Physical Exam     Physical Exam  Vitals and nursing note reviewed  Constitutional:       General: She is not in acute distress  Appearance: She is well-developed  She is not diaphoretic  HENT:      Right Ear: Ear canal and external ear normal  A middle ear effusion is present  Left Ear: Ear canal and external ear normal  A middle ear effusion is present  Nose: Congestion present  Mouth/Throat:      Mouth: Mucous membranes are moist       Tonsils: No tonsillar exudate or tonsillar abscesses  3+ on the right  3+ on the left  Neck:      Thyroid: Thyromegaly: surgically absent  Trachea: No tracheal deviation  Cardiovascular:      Rate and Rhythm: Normal rate and regular rhythm  Heart sounds: Normal heart sounds  No murmur heard  Pulmonary:      Effort: Pulmonary effort is normal  No respiratory distress  Breath sounds: Normal breath sounds  No wheezing  Musculoskeletal:         General: No tenderness or deformity  Normal range of motion  Cervical back: Normal range of motion  Skin:     General: Skin is warm and dry  Findings: No erythema or rash  Comments: Multiple scabs/scarring on stomach from healing MRSA infections   Neurological:      Mental Status: She is alert and oriented to person, place, and time  Psychiatric:         Mood and Affect: Mood normal          Behavior: Behavior normal  Behavior is cooperative  Thought Content:  Thought content normal          Judgment: Judgment normal

## 2022-06-13 NOTE — PROGRESS NOTES
HPI:  Debi Dowd is a 42 y o  female here for her yearly health maintenance exam    Patient Active Problem List   Diagnosis    Hypertension    GERD (gastroesophageal reflux disease)    Asthma    Arthritis    Class 3 severe obesity due to excess calories with serious comorbidity and body mass index (BMI) of 40 0 to 44 9 in [facility])    Chronic sinusitis    B12 deficiency    Iron deficiency anemia    Vitamin D deficiency    Hypothyroidism    H/O total thyroidectomy    Celiac disease    Anxiety    History of MRSA infection    Prediabetes    Allergic rhinitis with postnasal drip     Past Medical History:   Diagnosis Date    Acute sinus infection     Anxiety     Asthma     Depression     Disease of thyroid gland     GERD (gastroesophageal reflux disease)     Heel spur     right    Hypertension     MRSA (methicillin resistant Staphylococcus aureus)     right leg & right leg inner thigh    Neck pain     Obesity     Sleep disturbance          PHQ-2/9 Depression Screening    Little interest or pleasure in doing things: 0 - not at all  Feeling down, depressed, or hopeless: 0 - not at all  PHQ-2 Score: 0  PHQ-2 Interpretation: Negative depression screen           Current Outpatient Medications   Medication Sig Dispense Refill    albuterol (ProAir HFA) 90 mcg/act inhaler Inhale 2 puffs every 6 (six) hours as needed for wheezing 18 g 3    escitalopram (LEXAPRO) 20 mg tablet Take 1 tablet (20 mg total) by mouth daily 90 tablet 3    fluticasone (FLONASE) 50 mcg/act nasal spray 2 sprays into each nostril daily 48 g 3    levocetirizine (XYZAL) 5 MG tablet Take 1 tablet (5 mg total) by mouth every evening 90 tablet 3    lisinopril (ZESTRIL) 20 mg tablet Take 1 tablet (20 mg total) by mouth in the morning   90 tablet 3    montelukast (SINGULAIR) 10 mg tablet Take 1 tablet (10 mg total) by mouth daily at bedtime 90 tablet 3    mupirocin (BACTROBAN) 2 % ointment Put into both nostrils at bedtime with a Qtip x 3 months 30 g 2    omeprazole (PriLOSEC) 20 mg delayed release capsule Take 1 capsule (20 mg total) by mouth daily before breakfast 90 capsule 3    Synthroid 200 MCG tablet TAKE 1 TABLET DAILY, ADD [id number] FOR TOTAL OF 225MCG  DAILY 90 tablet 3     No current facility-administered medications for this visit  Allergies   Allergen Reactions    Other      seasonal    Vancomycin      Immunization History   Administered Date(s) Administered    Hep B, adult 02/05/2008, 03/04/2008    INFLUENZA 09/04/2015    Influenza, injectable, quadrivalent, preservative free 0 5 mL 11/02/2020, 10/21/2021    Tdap 04/23/2008, 05/07/2021       Patient Care Team:  Kailee King as PCP - General (Family Medicine)  Kurtis Cazares MD as PCP - [address] [place] [address] (RTE)    Review of Systems   Constitutional: Negative for activity change, diaphoresis, fatigue and fever  HENT: Positive for congestion, ear pain and rhinorrhea  Negative for facial swelling, hearing loss, sinus pressure, sinus pain, sneezing, sore throat and voice change  Eyes: Negative for discharge and visual disturbance  Respiratory: Positive for chest tightness and shortness of breath  Negative for cough, choking and stridor  Cardiovascular: Negative for chest pain, palpitations and leg swelling  Gastrointestinal: Negative for abdominal distention, abdominal pain, constipation, diarrhea, nausea and vomiting  Endocrine: Negative for polydipsia, polyphagia and polyuria  Genitourinary: Negative for difficulty urinating, dysuria, frequency and urgency  Musculoskeletal: Negative for arthralgias, back pain, gait problem, joint swelling, myalgias, neck pain and neck stiffness  Skin: Positive for wound  Negative for color change and rash  Neurological: Negative for dizziness, syncope, speech difficulty, weakness, light-headedness and headaches  Hematological: Negative for adenopathy  Does not bruise/bleed easily  Psychiatric/Behavioral: Negative for agitation, behavioral problems, confusion, hallucinations, sleep disturbance and suicidal ideas  The patient is nervous/anxious  Physical Exam :  Physical Exam  Vitals and nursing note reviewed  Constitutional:       General: She is not in acute distress  Appearance: She is well-developed  She is not diaphoretic  HENT:      Head: Normocephalic and atraumatic  Right Ear: Ear canal and external ear normal  A middle ear effusion is present  Left Ear: Ear canal and external ear normal  A middle ear effusion is present  Nose: Congestion present  Mouth/Throat:      Mouth: Mucous membranes are moist       Pharynx: Uvula midline  Eyes:      General:         Right eye: No discharge  Left eye: No discharge  Conjunctiva/sclera: Conjunctivae normal       Pupils: Pupils are equal, round, and reactive to light  Neck:      Thyroid: Thyromegaly: surgically absent  Cardiovascular:      Rate and Rhythm: Normal rate and regular rhythm  Heart sounds: Normal heart sounds  No murmur heard  No friction rub  No gallop  Pulmonary:      Effort: Pulmonary effort is normal  No respiratory distress  Breath sounds: Normal breath sounds  No wheezing or rales  Musculoskeletal:         General: No tenderness or deformity  Normal range of motion  Cervical back: Normal range of motion  Skin:     General: Skin is warm and dry  Findings: No erythema or rash  Neurological:      Mental Status: She is alert and oriented to person, place, and time  Cranial Nerves: No cranial nerve deficit  Coordination: Coordination normal    Psychiatric:         Mood and Affect: Mood normal          Behavior: Behavior normal          Thought Content:  Thought content normal          Judgment: Judgment normal            Reviewed Updated [facility] Wellness Visits:   Last Health Maintenance visit information was reviewed, patient interviewed , no change since last HM visit yes  Last  visit information was reviewed, patient interviewed and updates made to the record today yes    Assessment and Plan:  1  Annual physical exam     2  Hypertension, unspecified type  Comprehensive metabolic panel   3  Other iron deficiency anemia  Iron Panel (Includes Ferritin, Iron Sat%, Iron, and TIBC)    CBC and differential   4  Postablative hypothyroidism  TSH, 3rd generation with Free T4 reflex   5  Prediabetes  HEMOGLOBIN A1C W/ EAG ESTIMATION   6  Screening cholesterol level  Lipid panel   7  Mild intermittent asthma without complication  albuterol (ProAir HFA) 90 mcg/act inhaler    montelukast (SINGULAIR) 10 mg tablet   8  Allergic rhinitis with postnasal drip  montelukast (SINGULAIR) 10 mg tablet   9  H/O total thyroidectomy     10  Class 3 severe obesity due to excess calories with serious comorbidity and body mass index (BMI) of 45 0 to 49 9 in adult ([facility])       BMI Counseling: Body mass index is 47 13 kg/m²  The BMI is above normal  Nutrition recommendations include decreasing portion sizes, encouraging healthy choices of fruits and vegetables, decreasing fast food intake, consuming healthier snacks, limiting drinks that contain sugar, moderation in carbohydrate intake and increasing intake of lean protein  Exercise recommendations include exercising 3-5 times per week  Rationale for BMI follow-up plan is due to patient being overweight or obese  Depression Screening and Follow-up Plan: Patient was screened for depression during today's encounter  They screened negative with a PHQ-2 score of 0        Health Maintenance Due   Topic Date Due    Hepatitis C Screening  Never done    COVID-19 Vaccine (1) Never done    Pneumococcal Vaccine: Pediatrics (0 to 5 Years) and At-Risk Patients (6 to 59 Years) (1 - PCV) Never done    HIV Screening  Never done    Cervical Cancer Screening  Never done    Annual Physical  06/10/2022

## 2022-06-27 ENCOUNTER — APPOINTMENT (EMERGENCY)
Dept: RADIOLOGY | Facility: HOSPITAL | Age: 37
End: 2022-06-27
Payer: COMMERCIAL

## 2022-06-27 ENCOUNTER — HOSPITAL ENCOUNTER (EMERGENCY)
Facility: HOSPITAL | Age: 37
Discharge: HOME | End: 2022-06-27
Attending: EMERGENCY MEDICINE
Payer: COMMERCIAL

## 2022-06-27 VITALS
DIASTOLIC BLOOD PRESSURE: 70 MMHG | HEIGHT: 64 IN | WEIGHT: 293 LBS | SYSTOLIC BLOOD PRESSURE: 160 MMHG | OXYGEN SATURATION: 98 % | RESPIRATION RATE: 20 BRPM | HEART RATE: 76 BPM | TEMPERATURE: 98.4 F | BODY MASS INDEX: 50.02 KG/M2

## 2022-06-27 DIAGNOSIS — S83.92XA LEFT KNEE SPRAIN: Primary | ICD-10-CM

## 2022-06-27 DIAGNOSIS — M79.662 PAIN OF LEFT CALF: ICD-10-CM

## 2022-06-27 PROCEDURE — 73590 X-RAY EXAM OF LOWER LEG: CPT

## 2022-06-27 PROCEDURE — 99284 EMERGENCY DEPT VISIT MOD MDM: CPT

## 2022-06-27 PROCEDURE — 73564 X-RAY EXAM KNEE 4 OR MORE: CPT

## 2022-06-27 PROCEDURE — 99284 EMERGENCY DEPT VISIT MOD MDM: CPT | Performed by: EMERGENCY MEDICINE

## 2022-06-27 RX ORDER — HYDROCODONE BITARTRATE AND ACETAMINOPHEN 5; 325 MG/1; MG/1
1 TABLET ORAL ONCE
Status: COMPLETED | OUTPATIENT
Start: 2022-06-27 | End: 2022-06-27

## 2022-06-27 RX ORDER — HYDROCODONE BITARTRATE AND ACETAMINOPHEN 5; 325 MG/1; MG/1
1 TABLET ORAL EVERY 6 HOURS PRN
Qty: 10 TABLET | Refills: 0 | Status: SHIPPED | OUTPATIENT
Start: 2022-06-27 | End: 2022-07-01

## 2022-06-27 RX ADMIN — HYDROCODONE BITARTRATE AND ACETAMINOPHEN 1 TABLET: 5; 325 TABLET ORAL at 06:01

## 2022-06-27 NOTE — Clinical Note
Amber Cedillo accompanied ledyncyamil Corraledmond to the emergency department on 6/27/2022  Return date if applicable: 09/12/7302        If you have any questions or concerns, please don't hesitate to call        Aaron uGnn MD

## 2022-06-27 NOTE — DISCHARGE INSTRUCTIONS
RETURN IF WORSE IN ANY WAY, OR NEW AND CONCERNING SYMPTOMS SIGNS OR SYMPTOMS:  INCREASED PAIN, INCREASED SWELLING AT THE SITE OF INJURY    APPLY ICE AS NEEDED  YOU CAN GIVE TYLENOL AND MOTRIN, AS NEEDED, AND AS DIRECTED     PLEASE CALL YOUR PRIMARY DOCTOR IN THE MORNING TO SET UP FOLLOW UP   Please call Orthopaedic surgery to set up follow up   Please call the Ultrasound department to have Ultrasound done to evaluate for blood clot in the left leg     USE EXTREME CAUTION WHILE [address] WITH OTHER SEDATIVE MEDICATIONS OR SUBSTANCES, SUCH AS SLEEPING MEDICATIONS OR ALCOHOL OR OTHER SUCH SUBSTANCES  YOU MAY NEED TO TAKE LAXATIVES WHILE TAKING THIS MEDICATION  PLEASE DISCUSS THE ABOVE WITH Nicolasa Bower

## 2022-06-28 ENCOUNTER — HOSPITAL ENCOUNTER (OUTPATIENT)
Dept: NON INVASIVE DIAGNOSTICS | Facility: HOSPITAL | Age: 37
Discharge: HOME | End: 2022-06-28
Attending: EMERGENCY MEDICINE
Payer: COMMERCIAL

## 2022-06-28 DIAGNOSIS — M79.662 PAIN OF LEFT CALF: ICD-10-CM

## 2022-06-28 PROCEDURE — 93971 EXTREMITY STUDY: CPT

## 2022-06-28 PROCEDURE — 93971 EXTREMITY STUDY: CPT | Performed by: SURGERY

## 2022-07-01 ENCOUNTER — OFFICE VISIT (OUTPATIENT)
Dept: FAMILY MEDICINE CLINIC | Facility: CLINIC | Age: 37
End: 2022-07-01
Payer: COMMERCIAL

## 2022-07-01 VITALS
OXYGEN SATURATION: 98 % | BODY MASS INDEX: 46.28 KG/M2 | HEART RATE: 78 BPM | RESPIRATION RATE: 20 BRPM | DIASTOLIC BLOOD PRESSURE: 76 MMHG | WEIGHT: 288 LBS | TEMPERATURE: 98.2 F | SYSTOLIC BLOOD PRESSURE: 132 MMHG | HEIGHT: 66 IN

## 2022-07-01 DIAGNOSIS — S89.92XD INJURY OF LEFT KNEE, SUBSEQUENT ENCOUNTER: Primary | ICD-10-CM

## 2022-07-01 PROCEDURE — 99213 OFFICE O/P EST LOW 20 MIN: CPT | Performed by: NURSE PRACTITIONER

## 2022-07-01 PROCEDURE — 3078F DIAST BP <80 MM HG: CPT | Performed by: NURSE PRACTITIONER

## 2022-07-01 PROCEDURE — 3075F SYST BP GE 130 - 139MM HG: CPT | Performed by: NURSE PRACTITIONER

## 2022-07-01 PROCEDURE — 3725F SCREEN DEPRESSION PERFORMED: CPT | Performed by: NURSE PRACTITIONER

## 2022-07-01 NOTE — PATIENT INSTRUCTIONS
Consider tylenol for pain  Consider physical therapy if pain is worse  Consider orthopedic follow up if pain is worse  Continue using crutches if needed

## 2022-07-01 NOTE — PROGRESS NOTES
[facility]        NAME: Nishant Aopnte is a 42 y o  female  : 1985    MRN: [id number]  DATE: 2022  TIME: 2:15 PM    Assessment and Plan   Injury of left knee, subsequent encounter [S89 [id number]]  1  Injury of left knee, subsequent encounter           Patient Instructions     Patient Instructions   Consider tylenol for pain  Consider physical therapy if pain is worse  Consider orthopedic follow up if pain is worse  Continue using crutches if needed           Chief Complaint     Chief Complaint   Patient presents with    Follow-up         History of Present Illness       Fall while walking down stairs- missed 2 stairs and landed flat onto both knees  Main concern is continued left knee pain- had an xray and duplex done at the ER  She reports her pain is improved but continues  Has been using crutches since the injury  She doesn't think she still needs them  She is not taking the Norco prescribed to her- Has not needed tylenol or motrin for this  Review of Systems   Review of Systems   Constitutional: Negative for activity change, diaphoresis, fatigue and fever  HENT: Negative for congestion, facial swelling, hearing loss, rhinorrhea, sinus pressure, sinus pain, sneezing, sore throat and voice change  Eyes: Negative for discharge and visual disturbance  Respiratory: Negative for cough, choking, chest tightness, shortness of breath, wheezing and stridor  Cardiovascular: Negative for chest pain, palpitations and leg swelling  Endocrine: Negative for polydipsia, polyphagia and polyuria  Genitourinary: Negative for difficulty urinating, dysuria, frequency and urgency  Musculoskeletal: Positive for arthralgias, gait problem and myalgias  Skin: Negative for color change, rash and wound  Neurological: Negative for dizziness, syncope, speech difficulty, weakness, light-headedness and headaches  Hematological: Negative for adenopathy  Does not bruise/bleed easily  Psychiatric/Behavioral: Negative for agitation, behavioral problems, confusion, hallucinations, sleep disturbance and suicidal ideas  The patient is nervous/anxious            Current Medications       Current Outpatient Medications:     albuterol (ProAir HFA) 90 mcg/act inhaler, Inhale 2 puffs every 6 (six) hours as needed for wheezing, Disp: 18 g, Rfl: 3    escitalopram (LEXAPRO) 20 mg tablet, Take 1 tablet (20 mg total) by mouth daily, Disp: 90 tablet, Rfl: 3    fluticasone (FLONASE) 50 mcg/act nasal spray, 2 sprays into each nostril daily, Disp: 48 g, Rfl: 3    levocetirizine (XYZAL) 5 MG tablet, Take 1 tablet (5 mg total) by mouth every evening, Disp: 90 tablet, Rfl: 3    lisinopril (ZESTRIL) 20 mg tablet, Take 1 tablet (20 mg total) by mouth daily, Disp: 90 tablet, Rfl: 3    montelukast (SINGULAIR) 10 mg tablet, Take 1 tablet (10 mg total) by mouth daily at bedtime, Disp: 90 tablet, Rfl: 3    mupirocin (BACTROBAN) 2 % ointment, Put into both nostrils at bedtime with a Qtip x 3 months, Disp: 30 g, Rfl: 2    omeprazole (PriLOSEC) 20 mg delayed release capsule, Take 1 capsule (20 mg total) by mouth daily before breakfast, Disp: 90 capsule, Rfl: 3    Synthroid 200 MCG tablet, TAKE 1 TABLET DAILY, ADD [id number] FOR TOTAL OF 225MCG  DAILY, Disp: 90 tablet, Rfl: 3    Current Allergies     Allergies as of 07/01/2022 - Reviewed 07/01/2022   Allergen Reaction Noted    Other  08/15/2018    Vancomycin  08/15/2018            The following portions of the patient's history were reviewed and updated as appropriate: allergies, current medications, past family history, past medical history, past social history, past surgical history and problem list      Past Medical History:   Diagnosis Date    Acute sinus infection     Anxiety     Asthma     Depression     Disease of thyroid gland     GERD (gastroesophageal reflux disease)     Heel spur     right    Hypertension     MRSA (methicillin resistant Staphylococcus aureus)     right leg & right leg inner thigh    Neck pain     Obesity     Sleep disturbance        Past Surgical History:   Procedure Laterality Date    BREAST BIOPSY Right     benign     SECTION      x1 live birth   Jack Polio  SECTION      LEG SURGERY Right 2015    removal of MRSA    THYROIDECTOMY  2015       Family History   Problem Relation Age of Onset    Cervical cancer Mother     Hypertension Mother     Hypertension Father     Heart disease Maternal Uncle     Breast cancer Paternal Grandmother     No Known Problems Sister     No Known Problems Daughter     No Known Problems Maternal Grandmother     No Known Problems Maternal Grandfather     No Known Problems Paternal Grandfather     No Known Problems Sister     No Known Problems Daughter     No Known Problems Paternal Aunt     No Known Problems Paternal Aunt     No Known Problems Paternal Aunt     No Known Problems Paternal Aunt     No Known Problems Paternal Aunt     No Known Problems Paternal Aunt     No Known Problems Paternal Aunt          Medications have been verified  Objective   /76   Pulse 78   Temp 98 2 °F (36 8 °C) (Tympanic)   Resp 20   Ht 5' 6" (1 676 m)   Wt 131 kg (288 lb)   SpO2 98%   BMI 46 48 kg/m²        Physical Exam     Physical Exam  Constitutional:       General: She is not in acute distress  Appearance: She is well-developed  She is not ill-appearing or diaphoretic  Pulmonary:      Effort: Pulmonary effort is normal  No respiratory distress  Musculoskeletal:      Left knee: No swelling, deformity, effusion, erythema, ecchymosis, lacerations, bony tenderness or crepitus  Decreased range of motion  Tenderness present over the PCL  Normal alignment, normal meniscus and normal patellar mobility  Comments: Walking without crunches with mild limping gait   Skin:     Coloration: Skin is not pale     Neurological:      Mental Status: She is alert and oriented to person, place, and time  She is not disoriented  Psychiatric:         Mood and Affect: Mood normal          Speech: Speech normal          Behavior: Behavior normal  Behavior is cooperative  Thought Content:  Thought content normal          Judgment: Judgment normal

## 2022-07-12 ENCOUNTER — PATIENT MESSAGE (OUTPATIENT)
Dept: FAMILY MEDICINE CLINIC | Facility: CLINIC | Age: 37
End: 2022-07-12

## 2022-07-12 DIAGNOSIS — U07.1 COVID-19: Primary | ICD-10-CM

## 2022-07-12 NOTE — TELEPHONE ENCOUNTER
She would meet criteria for paxlovid or monoclonal  Need symptom onset and LMP, make sure she's not pregnant

## 2022-07-13 ENCOUNTER — PATIENT MESSAGE (OUTPATIENT)
Dept: FAMILY MEDICINE CLINIC | Facility: CLINIC | Age: 37
End: 2022-07-13

## 2022-07-13 LAB — SARS-COV-2 AG UPPER RESP QL IA: ABNORMAL

## 2022-08-03 ENCOUNTER — PATIENT MESSAGE (OUTPATIENT)
Dept: FAMILY MEDICINE CLINIC | Facility: CLINIC | Age: 37
End: 2022-08-03

## 2022-09-13 ENCOUNTER — OFFICE VISIT (OUTPATIENT)
Dept: FAMILY MEDICINE CLINIC | Facility: CLINIC | Age: 37
End: 2022-09-13
Payer: COMMERCIAL

## 2022-09-13 VITALS
WEIGHT: 288 LBS | RESPIRATION RATE: 12 BRPM | HEART RATE: 88 BPM | DIASTOLIC BLOOD PRESSURE: 98 MMHG | OXYGEN SATURATION: 100 % | TEMPERATURE: 99 F | SYSTOLIC BLOOD PRESSURE: 140 MMHG | BODY MASS INDEX: 46.48 KG/M2

## 2022-09-13 DIAGNOSIS — K14.8 TONGUE MASS: ICD-10-CM

## 2022-09-13 DIAGNOSIS — R09.81 NASAL CONGESTION: ICD-10-CM

## 2022-09-13 DIAGNOSIS — J02.9 SORE THROAT: Primary | ICD-10-CM

## 2022-09-13 DIAGNOSIS — I10 PRIMARY HYPERTENSION: ICD-10-CM

## 2022-09-13 LAB — S PYO AG THROAT QL: NEGATIVE

## 2022-09-13 PROCEDURE — 3080F DIAST BP >= 90 MM HG: CPT | Performed by: FAMILY MEDICINE

## 2022-09-13 PROCEDURE — 87070 CULTURE OTHR SPECIMN AEROBIC: CPT | Performed by: FAMILY MEDICINE

## 2022-09-13 PROCEDURE — 87147 CULTURE TYPE IMMUNOLOGIC: CPT | Performed by: FAMILY MEDICINE

## 2022-09-13 PROCEDURE — 3077F SYST BP >= 140 MM HG: CPT | Performed by: FAMILY MEDICINE

## 2022-09-13 PROCEDURE — 3725F SCREEN DEPRESSION PERFORMED: CPT | Performed by: FAMILY MEDICINE

## 2022-09-13 PROCEDURE — 87880 STREP A ASSAY W/OPTIC: CPT | Performed by: FAMILY MEDICINE

## 2022-09-13 PROCEDURE — 99214 OFFICE O/P EST MOD 30 MIN: CPT | Performed by: FAMILY MEDICINE

## 2022-09-13 RX ORDER — AMOXICILLIN AND CLAVULANATE POTASSIUM 875; 125 MG/1; MG/1
1 TABLET, FILM COATED ORAL EVERY 12 HOURS SCHEDULED
Qty: 20 TABLET | Refills: 0 | Status: SHIPPED | OUTPATIENT
Start: 2022-09-13 | End: 2022-09-23

## 2022-09-13 NOTE — PROGRESS NOTES
Assessment/Plan:       Problem List Items Addressed This Visit        Cardiovascular and Mediastinum    Hypertension     - Elevated today, monitor at home with close PCP follow-up            Other Visit Diagnoses     Sore throat    -  Primary    Relevant Medications    amoxicillin-clavulanate (AUGMENTIN) 875-125 mg per tablet    Other Relevant Orders    Throat culture (Completed)    POCT rapid strepA (Completed)    Nasal congestion        Relevant Medications    amoxicillin-clavulanate (AUGMENTIN) 875-125 mg per tablet    Other Relevant Orders    Throat culture (Completed)    POCT rapid strepA (Completed)    Tongue mass        Relevant Orders    Ambulatory Referral to Otolaryngology            Subjective:      Patient ID: Rhonda Torrez is a 42 y o  female  Thursday/Friday started with sore throat right side worse, hurts to swallow/hard time swallowing, then congestion thicker mucous/green color, both sides left worse ear pain, cough mostly dry cough, no fevers  Chest hurts with coughing, no chest pains  No shortness of breath  Home COVID tests negative x2   and daughter both sick  OTC cough/cold medication, cough drops  Using albuterol inhaler, 1-2 times per day with activity, not sleeping much due to cough  Allergies as well, worse recently  Cough  Associated symptoms include ear pain and a sore throat  Pertinent negatives include no headaches or shortness of breath  Sore Throat   This is a new problem  The current episode started in the past 7 days  The problem has been gradually worsening  The pain is worse on the right side  There has been no fever  The pain is at a severity of 5/10  Associated symptoms include congestion, coughing and ear pain  Pertinent negatives include no abdominal pain, diarrhea, drooling, ear discharge, headaches, hoarse voice, plugged ear sensation, neck pain, shortness of breath, stridor, swollen glands, trouble swallowing or vomiting   She has had no exposure to strep or mono  The following portions of the patient's history were reviewed and updated as appropriate: allergies, current medications, past family history, past medical history, past social history, past surgical history, and problem list     Review of Systems   HENT: Positive for congestion, ear pain and sore throat  Negative for drooling, ear discharge, hoarse voice and trouble swallowing  Respiratory: Positive for cough  Negative for shortness of breath and stridor  Gastrointestinal: Negative for abdominal pain, diarrhea and vomiting  Musculoskeletal: Negative for neck pain  Neurological: Negative for headaches  All other systems reviewed and are negative  Objective:      /98   Pulse 88   Temp 99 °F (37 2 °C)   Resp 12   Wt 131 kg (288 lb)   SpO2 100%   BMI 46 48 kg/m²          Physical Exam  Vitals reviewed  Constitutional:       General: She is not in acute distress  Appearance: Normal appearance  She is not ill-appearing, toxic-appearing or diaphoretic  HENT:      Head: Normocephalic and atraumatic  Right Ear: Tympanic membrane, ear canal and external ear normal  There is no impacted cerumen  Left Ear: Tympanic membrane, ear canal and external ear normal  There is no impacted cerumen  Nose: Congestion and rhinorrhea present  Mouth/Throat:      Mouth: Mucous membranes are moist       Pharynx: Posterior oropharyngeal erythema present  No oropharyngeal exudate  Comments: Purple mass/venous prominence underneath tongue   Eyes:      General:         Right eye: No discharge  Left eye: No discharge  Extraocular Movements: Extraocular movements intact  Conjunctiva/sclera: Conjunctivae normal    Cardiovascular:      Rate and Rhythm: Normal rate and regular rhythm  Heart sounds: Normal heart sounds  No murmur heard  No friction rub  No gallop  Pulmonary:      Effort: Pulmonary effort is normal  No respiratory distress  Breath sounds: Normal breath sounds  No stridor  No wheezing or rhonchi  Skin:     General: Skin is warm  Coloration: Skin is not pale  Findings: No erythema or rash  Neurological:      Mental Status: She is alert and oriented to person, place, and time  Motor: No weakness     Psychiatric:         Mood and Affect: Mood normal          Behavior: Behavior normal              DO Minoo Grewal [facility]

## 2022-09-14 ENCOUNTER — TELEPHONE (OUTPATIENT)
Dept: FAMILY MEDICINE CLINIC | Facility: CLINIC | Age: 37
End: 2022-09-14

## 2022-09-15 LAB — BACTERIA THROAT CULT: ABNORMAL

## 2022-09-15 NOTE — TELEPHONE ENCOUNTER
Please call to see how the patient is feeling, also if she has been able to check her blood pressure at home? Wanted to follow-up because it was high here at visit  Thanks!

## 2022-09-15 NOTE — TELEPHONE ENCOUNTER
Patient was given her throat culture results  She is feeling okay, just her throat hurts  She also is not able to keep track of her BP at home

## 2022-09-15 NOTE — TELEPHONE ENCOUNTER
Reviewed thanks, the antibiotic should start helping the sore throat soon given positive strep  I would have her schedule a follow-up with Brooke or BP check if unable to monitor at home

## 2022-09-26 DIAGNOSIS — E03.9 HYPOTHYROIDISM, UNSPECIFIED TYPE: ICD-10-CM

## 2022-09-26 RX ORDER — LEVOTHYROXINE SODIUM 200 MCG
TABLET ORAL
Qty: 90 TABLET | Refills: 3 | Status: SHIPPED | OUTPATIENT
Start: 2022-09-26

## 2022-10-14 ENCOUNTER — IMMUNIZATIONS (OUTPATIENT)
Dept: FAMILY MEDICINE CLINIC | Facility: CLINIC | Age: 37
End: 2022-10-14
Payer: COMMERCIAL

## 2022-10-14 ENCOUNTER — OFFICE VISIT (OUTPATIENT)
Dept: FAMILY MEDICINE CLINIC | Facility: CLINIC | Age: 37
End: 2022-10-14
Payer: COMMERCIAL

## 2022-10-14 VITALS
RESPIRATION RATE: 20 BRPM | TEMPERATURE: 98.9 F | SYSTOLIC BLOOD PRESSURE: 136 MMHG | WEIGHT: 293 LBS | HEART RATE: 86 BPM | DIASTOLIC BLOOD PRESSURE: 80 MMHG | BODY MASS INDEX: 47.09 KG/M2 | HEIGHT: 66 IN | OXYGEN SATURATION: 99 %

## 2022-10-14 DIAGNOSIS — R09.81 NASAL SINUS CONGESTION: Primary | ICD-10-CM

## 2022-10-14 DIAGNOSIS — J30.9 ALLERGIC RHINITIS WITH POSTNASAL DRIP: ICD-10-CM

## 2022-10-14 DIAGNOSIS — Z23 NEED FOR VACCINATION: Primary | ICD-10-CM

## 2022-10-14 DIAGNOSIS — R09.82 ALLERGIC RHINITIS WITH POSTNASAL DRIP: ICD-10-CM

## 2022-10-14 DIAGNOSIS — R40.0 DAYTIME SLEEPINESS: ICD-10-CM

## 2022-10-14 DIAGNOSIS — R06.83 LOUD SNORING: ICD-10-CM

## 2022-10-14 PROCEDURE — 90686 IIV4 VACC NO PRSV 0.5 ML IM: CPT

## 2022-10-14 PROCEDURE — 99213 OFFICE O/P EST LOW 20 MIN: CPT | Performed by: NURSE PRACTITIONER

## 2022-10-14 PROCEDURE — 90471 IMMUNIZATION ADMIN: CPT

## 2022-10-14 RX ORDER — PREDNISONE 20 MG/1
TABLET ORAL
Qty: 15 TABLET | Refills: 0 | Status: SHIPPED | OUTPATIENT
Start: 2022-10-14

## 2022-10-14 NOTE — PROGRESS NOTES
[facility]        NAME: Valeri Nicholas is a 42 y o  female  : 1985    MRN: [id number]  DATE: 2022  TIME: 8:08 AM    Assessment and Plan   Nasal sinus congestion [R09 81]  1  Nasal sinus congestion  predniSONE 20 mg tablet    Ambulatory Referral to Otolaryngology   2  Daytime sleepiness  Ambulatory Referral to Sleep Medicine   3  Loud snoring  Ambulatory Referral to Sleep Medicine   4  Allergic rhinitis with postnasal drip  Ambulatory Referral to Otolaryngology         Patient Instructions     Patient Instructions   Referral given to sleep medicine and Ent  Prednisone as directed, continue all allergy medications and flonase  Call for problems/concerns          Chief Complaint     Chief Complaint   Patient presents with   • Nasal Congestion         History of Present Illness       She is here to discuss severe nasal congestion since being sick 1 month ago  Denies using Afrin or over the counter nasal spray  Has been using Flonase as directed every day  Is taking her allergy medications daily also  Has also been taking Decongestant for high blood pressure  She has daytime fatigue to the point that she is sleepy while driving and unsure how she gets home  Her  videotaped her sleeping- "I sounded like a goose"  Her  can't sleep in the same room because she snores so loud  She reports that she "always snored" and she used to have a CPAP machine  She was told she no longer needed it "because my levels were fine"  Review of Systems   Review of Systems   Constitutional: Positive for fatigue  Negative for activity change, diaphoresis and fever  HENT: Positive for congestion and rhinorrhea  Negative for facial swelling, hearing loss, sinus pressure, sinus pain, sneezing, sore throat and voice change  Eyes: Negative for discharge and visual disturbance     Respiratory: Positive for cough, choking (in her sleep), shortness of breath and wheezing (with exertion)  Negative for chest tightness and stridor  Cardiovascular: Negative for chest pain, palpitations and leg swelling  Gastrointestinal: Negative for abdominal distention, abdominal pain, constipation, diarrhea, nausea and vomiting  Endocrine: Negative for polydipsia, polyphagia and polyuria  Genitourinary: Negative for difficulty urinating, dysuria, frequency and urgency  Musculoskeletal: Negative for arthralgias, back pain, gait problem, joint swelling, myalgias, neck pain and neck stiffness  Skin: Negative for color change, rash and wound  Neurological: Negative for dizziness, syncope, speech difficulty, weakness, light-headedness and headaches  Hematological: Negative for adenopathy  Does not bruise/bleed easily  Psychiatric/Behavioral: Negative for agitation, behavioral problems, confusion, hallucinations, sleep disturbance and suicidal ideas  The patient is not nervous/anxious            Current Medications       Current Outpatient Medications:   •  albuterol (ProAir HFA) 90 mcg/act inhaler, Inhale 2 puffs every 6 (six) hours as needed for wheezing, Disp: 18 g, Rfl: 3  •  escitalopram (LEXAPRO) 20 mg tablet, Take 1 tablet (20 mg total) by mouth daily, Disp: 90 tablet, Rfl: 3  •  fluticasone (FLONASE) 50 mcg/act nasal spray, 2 sprays into each nostril daily, Disp: 48 g, Rfl: 3  •  levocetirizine (XYZAL) 5 MG tablet, Take 1 tablet (5 mg total) by mouth every evening, Disp: 90 tablet, Rfl: 3  •  lisinopril (ZESTRIL) 20 mg tablet, Take 1 tablet (20 mg total) by mouth daily, Disp: 90 tablet, Rfl: 3  •  montelukast (SINGULAIR) 10 mg tablet, Take 1 tablet (10 mg total) by mouth daily at bedtime, Disp: 90 tablet, Rfl: 3  •  mupirocin (BACTROBAN) 2 % ointment, Put into both nostrils at bedtime with a Qtip x 3 months, Disp: 30 g, Rfl: 2  •  omeprazole (PriLOSEC) 20 mg delayed release capsule, Take 1 capsule (20 mg total) by mouth daily before breakfast, Disp: 90 capsule, Rfl: 3  •  predniSONE 20 mg tablet, 20mg BID x 5 days then 20mg daily x 5 days, Disp: 15 tablet, Rfl: 0  •  Synthroid 200 MCG tablet, TAKE 1 TABLET DAILY, ADD [id number] FOR TOTAL OF 225MCG  DAILY, Disp: 90 tablet, Rfl: 3    Current Allergies     Allergies as of 10/14/2022 - Reviewed 10/14/2022   Allergen Reaction Noted   • Other  08/15/2018   • Vancomycin  08/15/2018            The following portions of the patient's history were reviewed and updated as appropriate: allergies, current medications, past family history, past medical history, past social history, past surgical history and problem list      Past Medical History:   Diagnosis Date   • Acute sinus infection    • Anxiety    • Asthma    • Depression    • Disease of thyroid gland    • GERD (gastroesophageal reflux disease)    • Heel spur     right   • Hypertension    • MRSA (methicillin resistant Staphylococcus aureus)     right leg & right leg inner thigh   • Neck pain    • Obesity    • Sleep disturbance        Past Surgical History:   Procedure Laterality Date   • BREAST BIOPSY Right     benign   •  SECTION      x1 live birth   •  SECTION     • LEG SURGERY Right     removal of MRSA   • THYROIDECTOMY         Family History   Problem Relation Age of Onset   • Cervical cancer Mother    • Hypertension Mother    • Hypertension Father    • Heart disease Maternal Uncle    • Breast cancer Paternal Grandmother    • No Known Problems Sister    • No Known Problems Daughter    • No Known Problems Maternal Grandmother    • No Known Problems Maternal Grandfather    • No Known Problems Paternal Grandfather    • No Known Problems Sister    • No Known Problems Daughter    • No Known Problems Paternal Aunt    • No Known Problems Paternal Aunt    • No Known Problems Paternal Aunt    • No Known Problems Paternal Aunt    • No Known Problems Paternal Aunt    • No Known Problems Paternal Aunt    • No Known Problems Paternal Aunt          Medications have been verified  Objective   /80   Pulse 86   Temp 98 9 °F (37 2 °C) (Tympanic)   Resp 20   Ht 5' 6" (1 676 m)   Wt 136 kg (300 lb)   SpO2 99%   BMI 48 42 kg/m²        Physical Exam     Physical Exam  Vitals and nursing note reviewed  Constitutional:       General: She is not in acute distress  Appearance: She is well-developed  She is not diaphoretic  HENT:      Head: Normocephalic and atraumatic  Right Ear: Tympanic membrane, ear canal and external ear normal       Left Ear: Tympanic membrane, ear canal and external ear normal       Nose: Congestion (severe) present  Mouth/Throat:      Mouth: Mucous membranes are moist       Pharynx: Uvula midline  Tonsils: 3+ on the right  3+ on the left  Eyes:      General:         Right eye: No discharge  Left eye: No discharge  Conjunctiva/sclera: Conjunctivae normal       Pupils: Pupils are equal, round, and reactive to light  Cardiovascular:      Rate and Rhythm: Normal rate and regular rhythm  Heart sounds: Normal heart sounds  No murmur heard  No friction rub  No gallop  Pulmonary:      Effort: Pulmonary effort is normal  No respiratory distress  Breath sounds: Normal breath sounds  No wheezing or rales  Musculoskeletal:         General: No tenderness or deformity  Normal range of motion  Cervical back: Normal range of motion  Skin:     General: Skin is warm and dry  Findings: No erythema or rash  Neurological:      Mental Status: She is alert and oriented to person, place, and time  Cranial Nerves: No cranial nerve deficit  Coordination: Coordination normal    Psychiatric:         Mood and Affect: Mood is anxious  Behavior: Behavior normal          Thought Content:  Thought content normal          Judgment: Judgment normal

## 2022-10-14 NOTE — PATIENT INSTRUCTIONS
Referral given to sleep medicine and Ent  Prednisone as directed, continue all allergy medications and flonase  Call for problems/concerns

## 2022-10-24 DIAGNOSIS — A49.02 MRSA INFECTION (METHICILLIN-RESISTANT STAPHYLOCOCCUS AUREUS): Primary | ICD-10-CM

## 2022-10-24 RX ORDER — SULFAMETHOXAZOLE AND TRIMETHOPRIM 800; 160 MG/1; MG/1
1 TABLET ORAL EVERY 12 HOURS SCHEDULED
Qty: 20 TABLET | Refills: 0 | Status: SHIPPED | OUTPATIENT
Start: 2022-10-24 | End: 2022-11-03

## 2022-10-25 ENCOUNTER — OFFICE VISIT (OUTPATIENT)
Dept: FAMILY MEDICINE CLINIC | Facility: CLINIC | Age: 37
End: 2022-10-25
Payer: COMMERCIAL

## 2022-10-25 VITALS
DIASTOLIC BLOOD PRESSURE: 78 MMHG | HEART RATE: 89 BPM | OXYGEN SATURATION: 99 % | BODY MASS INDEX: 47.09 KG/M2 | TEMPERATURE: 98.7 F | RESPIRATION RATE: 20 BRPM | SYSTOLIC BLOOD PRESSURE: 132 MMHG | WEIGHT: 293 LBS | HEIGHT: 66 IN

## 2022-10-25 DIAGNOSIS — L02.91 ABSCESS: ICD-10-CM

## 2022-10-25 DIAGNOSIS — F51.01 PRIMARY INSOMNIA: ICD-10-CM

## 2022-10-25 DIAGNOSIS — A49.02 MRSA INFECTION: Primary | ICD-10-CM

## 2022-10-25 PROCEDURE — 99213 OFFICE O/P EST LOW 20 MIN: CPT | Performed by: NURSE PRACTITIONER

## 2022-10-25 RX ORDER — ZOLPIDEM TARTRATE 10 MG/1
10 TABLET ORAL
Qty: 30 TABLET | Refills: 0 | Status: SHIPPED | OUTPATIENT
Start: 2022-10-25

## 2022-10-25 NOTE — PATIENT INSTRUCTIONS
Bactrim as ordered  If symptoms worsen call office for recheck or referral to general surgery  Take 1/2 -1 tablet of Ambien 1 hour before you'd like to go to sleep at night   Call for problems/concerns/questions

## 2022-10-25 NOTE — PROGRESS NOTES
[facility]        NAME: Elmer Christian is a 42 y o  female  : 1985    MRN: [id number]  DATE: 2022  TIME: 11:04 AM    Assessment and Plan   MRSA infection [A49 02]  1  MRSA infection     2  Abscess     3  Primary insomnia  zolpidem (AMBIEN) 10 mg tablet         Patient Instructions     Patient Instructions    Bactrim as ordered  If symptoms worsen call office for recheck or referral to general surgery  Take 1/2 -1 tablet of Ambien 1 hour before you'd like to go to sleep at night  Call for problems/concerns/questions          Chief Complaint     Chief Complaint   Patient presents with   • Abdominal Pain     Left Side Abd Lump         History of Present Illness       Here for a lump and sore on her left abdomen  See picture sent by sallie yesterday  Started on Saturday- dog kicked it and it did bleed  I sent Bactrim DS for her yesterday but it has not been ready at the pharmacy  She has a history of MRSA infections, did 3 months of Bactroban in the past      Requesting sleeping medication  Has an appointment for sleep apnea study  She is now falling asleep during the day because she isn't sleeping well at night  Review of Systems   Review of Systems   Constitutional: Negative for activity change, diaphoresis, fatigue and fever  HENT: Negative for congestion, facial swelling, hearing loss, rhinorrhea, sinus pressure, sinus pain, sneezing, sore throat and voice change  Eyes: Negative for discharge and visual disturbance  Respiratory: Negative for cough, choking, chest tightness, shortness of breath, wheezing and stridor  Cardiovascular: Negative for chest pain, palpitations and leg swelling  Gastrointestinal: Negative for abdominal distention, abdominal pain, constipation, diarrhea, nausea and vomiting  Endocrine: Negative for polydipsia, polyphagia and polyuria  Genitourinary: Negative for difficulty urinating, dysuria, frequency and urgency  Musculoskeletal: Negative for arthralgias, back pain, gait problem, joint swelling, myalgias, neck pain and neck stiffness  Skin: Positive for wound  Negative for color change and rash  Neurological: Negative for dizziness, syncope, speech difficulty, weakness, light-headedness and headaches  Hematological: Negative for adenopathy  Does not bruise/bleed easily  Psychiatric/Behavioral: Positive for sleep disturbance  Negative for agitation, behavioral problems, confusion, hallucinations and suicidal ideas  The patient is nervous/anxious            Current Medications       Current Outpatient Medications:   •  zolpidem (AMBIEN) 10 mg tablet, Take 1 tablet (10 mg total) by mouth daily at bedtime as needed for sleep, Disp: 30 tablet, Rfl: 0  •  albuterol (ProAir HFA) 90 mcg/act inhaler, Inhale 2 puffs every 6 (six) hours as needed for wheezing, Disp: 18 g, Rfl: 3  •  escitalopram (LEXAPRO) 20 mg tablet, Take 1 tablet (20 mg total) by mouth daily, Disp: 90 tablet, Rfl: 3  •  fluticasone (FLONASE) 50 mcg/act nasal spray, 2 sprays into each nostril daily, Disp: 48 g, Rfl: 3  •  levocetirizine (XYZAL) 5 MG tablet, Take 1 tablet (5 mg total) by mouth every evening, Disp: 90 tablet, Rfl: 3  •  lisinopril (ZESTRIL) 20 mg tablet, Take 1 tablet (20 mg total) by mouth daily, Disp: 90 tablet, Rfl: 3  •  montelukast (SINGULAIR) 10 mg tablet, Take 1 tablet (10 mg total) by mouth daily at bedtime, Disp: 90 tablet, Rfl: 3  •  mupirocin (BACTROBAN) 2 % ointment, Put into both nostrils at bedtime with a Qtip x 3 months, Disp: 30 g, Rfl: 2  •  omeprazole (PriLOSEC) 20 mg delayed release capsule, Take 1 capsule (20 mg total) by mouth daily before breakfast, Disp: 90 capsule, Rfl: 3  •  predniSONE 20 mg tablet, 20mg BID x 5 days then 20mg daily x 5 days, Disp: 15 tablet, Rfl: 0  •  sulfamethoxazole-trimethoprim (BACTRIM DS) 800-160 mg per tablet, Take 1 tablet by mouth every 12 (twelve) hours for 10 days, Disp: 20 tablet, Rfl: 0  •  Synthroid 200 MCG tablet, TAKE 1 TABLET DAILY, ADD [id number] FOR TOTAL OF 225MCG  DAILY, Disp: 90 tablet, Rfl: 3    Current Allergies     Allergies as of 10/25/2022 - Reviewed 10/25/2022   Allergen Reaction Noted   • Other  08/15/2018   • Vancomycin  08/15/2018            The following portions of the patient's history were reviewed and updated as appropriate: allergies, current medications, past family history, past medical history, past social history, past surgical history and problem list      Past Medical History:   Diagnosis Date   • Acute sinus infection    • Anxiety    • Asthma    • Depression    • Disease of thyroid gland    • GERD (gastroesophageal reflux disease)    • Heel spur     right   • Hypertension    • MRSA (methicillin resistant Staphylococcus aureus)     right leg & right leg inner thigh   • Neck pain    • Obesity    • Sleep disturbance        Past Surgical History:   Procedure Laterality Date   • BREAST BIOPSY Right     benign   •  SECTION      x1 live birth   •  SECTION     • LEG SURGERY Right     removal of MRSA   • THYROIDECTOMY         Family History   Problem Relation Age of Onset   • Cervical cancer Mother    • Hypertension Mother    • Hypertension Father    • Heart disease Maternal Uncle    • Breast cancer Paternal Grandmother    • No Known Problems Sister    • No Known Problems Daughter    • No Known Problems Maternal Grandmother    • No Known Problems Maternal Grandfather    • No Known Problems Paternal Grandfather    • No Known Problems Sister    • No Known Problems Daughter    • No Known Problems Paternal Aunt    • No Known Problems Paternal Aunt    • No Known Problems Paternal Aunt    • No Known Problems Paternal Aunt    • No Known Problems Paternal Aunt    • No Known Problems Paternal Aunt    • No Known Problems Paternal Aunt          Medications have been verified          Objective   /78   Pulse 89   Temp 98 7 °F (37 1 °C) (Tympanic)   Resp 20  5' 6" (1 676 m)   Wt (!) 137 kg (302 lb 12 8 oz)   SpO2 99%   BMI 48 87 kg/m²        Physical Exam     Physical Exam  Vitals and nursing note reviewed  Constitutional:       General: She is not in acute distress  Appearance: She is well-developed  She is not diaphoretic  Neck:      Thyroid: No thyromegaly  Trachea: No tracheal deviation  Cardiovascular:      Rate and Rhythm: Normal rate and regular rhythm  Heart sounds: Normal heart sounds  No murmur heard  Pulmonary:      Effort: Pulmonary effort is normal  No respiratory distress  Breath sounds: Normal breath sounds  No wheezing  Musculoskeletal:         General: No tenderness or deformity  Normal range of motion  Cervical back: Normal range of motion and neck supple  Skin:     General: Skin is warm and dry  Findings: Erythema present  Neurological:      Mental Status: She is alert and oriented to person, place, and time  Psychiatric:         Attention and Perception: Attention normal          Mood and Affect: Mood is anxious  Speech: Speech normal          Behavior: Behavior normal  Behavior is cooperative  Thought Content:  Thought content normal          Cognition and Memory: Cognition and memory normal          Judgment: Judgment normal

## 2022-12-08 ENCOUNTER — OFFICE VISIT (OUTPATIENT)
Dept: FAMILY MEDICINE CLINIC | Facility: CLINIC | Age: 37
End: 2022-12-08

## 2022-12-08 VITALS
OXYGEN SATURATION: 99 % | HEIGHT: 66 IN | SYSTOLIC BLOOD PRESSURE: 136 MMHG | TEMPERATURE: 98 F | HEART RATE: 91 BPM | DIASTOLIC BLOOD PRESSURE: 82 MMHG | RESPIRATION RATE: 20 BRPM | WEIGHT: 293 LBS | BODY MASS INDEX: 47.09 KG/M2

## 2022-12-08 DIAGNOSIS — Z13.220 SCREENING CHOLESTEROL LEVEL: ICD-10-CM

## 2022-12-08 DIAGNOSIS — M25.562 CHRONIC PAIN OF LEFT KNEE: Primary | ICD-10-CM

## 2022-12-08 DIAGNOSIS — G89.29 CHRONIC PAIN OF LEFT KNEE: Primary | ICD-10-CM

## 2022-12-08 DIAGNOSIS — R73.03 PREDIABETES: ICD-10-CM

## 2022-12-08 DIAGNOSIS — F51.01 PRIMARY INSOMNIA: ICD-10-CM

## 2022-12-08 DIAGNOSIS — I10 PRIMARY HYPERTENSION: ICD-10-CM

## 2022-12-08 DIAGNOSIS — D50.8 OTHER IRON DEFICIENCY ANEMIA: ICD-10-CM

## 2022-12-08 DIAGNOSIS — E03.9 HYPOTHYROIDISM, UNSPECIFIED TYPE: ICD-10-CM

## 2022-12-08 RX ORDER — LORAZEPAM 1 MG/1
1 TABLET ORAL
Qty: 90 TABLET | Refills: 0 | Status: SHIPPED | OUTPATIENT
Start: 2022-12-08

## 2022-12-08 NOTE — PATIENT INSTRUCTIONS
You can use a compression wrap (Ace wrap) to help support your knee  Continue tylenol and motrin as needed for pain or swelling     Stop Ambien, you can take Ativan as needed at bedtime for sleep  Start Voltaren as ordered for knee pain  Schedule an appointment with Orthopedics for further evaluation  Have blood work completed before next visit

## 2022-12-08 NOTE — PROGRESS NOTES
[facility]        NAME: Colton Mclain is a 42 y o  female  : 1985    MRN: [id number]  DATE: 2022  TIME: 8:40 AM    Assessment and Plan   Chronic pain of left knee [M25 562, G89 29]  1  Chronic pain of left knee  Ambulatory Referral to Orthopedic Surgery    diclofenac sodium (VOLTAREN) 50 mg EC tablet      2  Primary insomnia  LORazepam (ATIVAN) 1 mg tablet      3  Hypothyroidism, unspecified type  TSH, 3rd generation with Free T4 reflex      4  Primary hypertension  CBC and differential    Comprehensive metabolic panel      5  Other iron deficiency anemia  Iron Panel (Includes Ferritin, Iron Sat%, Iron, and TIBC)      6  Prediabetes  HEMOGLOBIN A1C W/ EAG ESTIMATION      7  Screening cholesterol level  Lipid panel            Patient Instructions     Patient Instructions   You can use a compression wrap (Ace wrap) to help support your knee  Continue tylenol and motrin as needed for pain or swelling  Stop Ambien, you can take Ativan as needed at bedtime for sleep  Start Voltaren as ordered for knee pain  Schedule an appointment with Orthopedics for further evaluation  Have blood work completed before next visit          Chief Complaint     Chief Complaint   Patient presents with   • Leg Pain         History of Present Illness       Megan Brandt in July - went for testing, was doing fine  Now intermittently lateral left knee pain - so bad she cannot get up on her own, cannot weight bear, no swelling, no bruising  Tylenol and motrin do help slightly  Has not been able to rest  Uses spray icy hot  Has not tried any other topical cream  Agreeable to voltaren  Randomly wakes up on the floor, walks into the wall, sits on edge of bed, wakes up with bruises when taking Ambien  Does not feel rested when she takes Ambien, but does not sleep without it  Agreeable to switch medication for safety concerns  Has appt with sleep med but not until 2023   Was told she has to lose weight first before being seen  Reports poor eating habits, eating TV dinners at 8 pm at night  Knows she needs to get into a better routine  Discussed her weight and how it could contribute to knee pain  Review of Systems   Review of Systems   Constitutional: Negative for activity change, diaphoresis, fatigue and fever  HENT: Negative for congestion, facial swelling, hearing loss, rhinorrhea, sinus pressure, sinus pain, sneezing, sore throat and voice change  Eyes: Negative for discharge and visual disturbance  Respiratory: Negative for cough, choking, chest tightness, shortness of breath, wheezing and stridor  Cardiovascular: Negative for chest pain, palpitations and leg swelling  Gastrointestinal: Negative for abdominal distention, abdominal pain, constipation, diarrhea, nausea and vomiting  Endocrine: Negative for polydipsia, polyphagia and polyuria  Genitourinary: Negative for difficulty urinating, dysuria, frequency and urgency  Musculoskeletal: Positive for arthralgias (left knee - since injury in July), gait problem (intermittent due to left knee pain) and myalgias (left knee - since injury in July)  Negative for neck pain and neck stiffness  Skin: Negative for color change, rash and wound  Notices intermittent bruising to left knee     Neurological: Negative for dizziness, syncope, speech difficulty, weakness, light-headedness, numbness and headaches  Hematological: Negative for adenopathy  Does not bruise/bleed easily  Psychiatric/Behavioral: Positive for sleep disturbance  Negative for agitation, behavioral problems, confusion, hallucinations and suicidal ideas  The patient is nervous/anxious            Current Medications       Current Outpatient Medications:   •  albuterol (ProAir HFA) 90 mcg/act inhaler, Inhale 2 puffs every 6 (six) hours as needed for wheezing, Disp: 18 g, Rfl: 3  •  diclofenac sodium (VOLTAREN) 50 mg EC tablet, Take 1 tablet (50 mg total) by mouth 2 (two) times a day as needed (pain), Disp: 90 tablet, Rfl: 1  •  escitalopram (LEXAPRO) 20 mg tablet, Take 1 tablet (20 mg total) by mouth daily, Disp: 90 tablet, Rfl: 3  •  levocetirizine (XYZAL) 5 MG tablet, Take 1 tablet (5 mg total) by mouth every evening, Disp: 90 tablet, Rfl: 3  •  lisinopril (ZESTRIL) 20 mg tablet, Take 1 tablet (20 mg total) by mouth daily, Disp: 90 tablet, Rfl: 3  •  LORazepam (ATIVAN) 1 mg tablet, Take 1 tablet (1 mg total) by mouth daily at bedtime as needed for sleep, Disp: 90 tablet, Rfl: 0  •  LOW-DOSE ASPIRIN PO, , Disp: , Rfl:   •  montelukast (SINGULAIR) 10 mg tablet, Take 1 tablet (10 mg total) by mouth daily at bedtime, Disp: 90 tablet, Rfl: 3  •  omeprazole (PriLOSEC) 20 mg delayed release capsule, Take 1 capsule (20 mg total) by mouth daily before breakfast, Disp: 90 capsule, Rfl: 3  •  Synthroid 200 MCG tablet, TAKE 1 TABLET DAILY, ADD [id number] FOR TOTAL OF 225MCG  DAILY, Disp: 90 tablet, Rfl: 3  •  fluticasone (FLONASE) 50 mcg/act nasal spray, 2 sprays into each nostril daily, Disp: 48 g, Rfl: 3  •  mupirocin (BACTROBAN) 2 % ointment, Put into both nostrils at bedtime with a Qtip x 3 months, Disp: 30 g, Rfl: 2  •  predniSONE 20 mg tablet, 20mg BID x 5 days then 20mg daily x 5 days (Patient not taking: Reported on 12/8/2022), Disp: 15 tablet, Rfl: 0    Current Allergies     Allergies as of 12/08/2022 - Reviewed 12/08/2022   Allergen Reaction Noted   • Other  08/15/2018   • Vancomycin  08/15/2018            The following portions of the patient's history were reviewed and updated as appropriate: allergies, current medications, past family history, past medical history, past social history, past surgical history and problem list      Past Medical History:   Diagnosis Date   • Acute sinus infection    • Anxiety    • Asthma    • Depression    • Disease of thyroid gland    • GERD (gastroesophageal reflux disease)    • Heel spur     right   • Hypertension    • MRSA (methicillin resistant Staphylococcus aureus)     right leg & right leg inner thigh   • Neck pain    • Obesity    • Sleep disturbance        Past Surgical History:   Procedure Laterality Date   • BREAST BIOPSY Right     benign   •  SECTION      x1 live birth   •  SECTION     • LEG SURGERY Right 2015    removal of MRSA   • THYROIDECTOMY  2015       Family History   Problem Relation Age of Onset   • Cervical cancer Mother    • Hypertension Mother    • Hypertension Father    • Heart disease Maternal Uncle    • Breast cancer Paternal Grandmother    • No Known Problems Sister    • No Known Problems Daughter    • No Known Problems Maternal Grandmother    • No Known Problems Maternal Grandfather    • No Known Problems Paternal Grandfather    • No Known Problems Sister    • No Known Problems Daughter    • No Known Problems Paternal Aunt    • No Known Problems Paternal Aunt    • No Known Problems Paternal Aunt    • No Known Problems Paternal Aunt    • No Known Problems Paternal Aunt    • No Known Problems Paternal Aunt    • No Known Problems Paternal Aunt          Medications have been verified  Objective   /82   Pulse 91   Temp 98 °F (36 7 °C) (Tympanic)   Resp 20   Ht 5' 6" (1 676 m)   Wt (!) 141 kg (311 lb)   SpO2 99%   BMI 50 20 kg/m²        Physical Exam     Physical Exam  Vitals and nursing note reviewed  Constitutional:       General: She is not in acute distress  Appearance: She is well-developed  She is obese  She is not diaphoretic  Neck:      Thyroid: No thyromegaly  Trachea: No tracheal deviation  Cardiovascular:      Rate and Rhythm: Normal rate and regular rhythm  Heart sounds: Normal heart sounds  No murmur heard  Pulmonary:      Effort: Pulmonary effort is normal  No respiratory distress  Breath sounds: Normal breath sounds  No wheezing  Musculoskeletal:         General: No deformity  Normal range of motion  Cervical back: Normal range of motion and neck supple  Left knee: No swelling, deformity, effusion, erythema, ecchymosis, bony tenderness or crepitus  Normal range of motion  Tenderness (left knee) present over the lateral joint line  No LCL laxity, MCL laxity, ACL laxity or PCL laxity  Normal pulse  Skin:     General: Skin is warm and dry  Findings: No erythema or rash  Neurological:      Mental Status: She is alert and oriented to person, place, and time  Psychiatric:         Mood and Affect: Mood is anxious  Speech: Speech is rapid and pressured  Behavior: Behavior normal  Behavior is cooperative  Thought Content:  Thought content normal          Judgment: Judgment normal

## 2022-12-14 ENCOUNTER — APPOINTMENT (OUTPATIENT)
Dept: LAB | Facility: CLINIC | Age: 37
End: 2022-12-14

## 2022-12-14 DIAGNOSIS — I10 PRIMARY HYPERTENSION: ICD-10-CM

## 2022-12-14 DIAGNOSIS — R73.03 PREDIABETES: ICD-10-CM

## 2022-12-14 DIAGNOSIS — Z13.220 SCREENING CHOLESTEROL LEVEL: ICD-10-CM

## 2022-12-14 DIAGNOSIS — D50.8 OTHER IRON DEFICIENCY ANEMIA: ICD-10-CM

## 2022-12-14 DIAGNOSIS — E03.9 HYPOTHYROIDISM, UNSPECIFIED TYPE: ICD-10-CM

## 2022-12-14 LAB
ALBUMIN SERPL BCP-MCNC: 3.4 G/DL (ref 3.5–5)
ALP SERPL-CCNC: 83 U/L (ref 46–116)
ALT SERPL W P-5'-P-CCNC: 22 U/L (ref 12–78)
ANION GAP SERPL CALCULATED.3IONS-SCNC: 6 MMOL/L (ref 4–13)
AST SERPL W P-5'-P-CCNC: 13 U/L (ref 5–45)
BASOPHILS # BLD AUTO: 0.07 THOUSANDS/ÂΜL (ref 0–0.1)
BASOPHILS NFR BLD AUTO: 1 % (ref 0–1)
BILIRUB SERPL-MCNC: 0.29 MG/DL (ref 0.2–1)
BUN SERPL-MCNC: 12 MG/DL (ref 5–25)
CALCIUM ALBUM COR SERPL-MCNC: 9.4 MG/DL (ref 8.3–10.1)
CALCIUM SERPL-MCNC: 8.9 MG/DL (ref 8.3–10.1)
CHLORIDE SERPL-SCNC: 104 MMOL/L (ref 96–108)
CHOLEST SERPL-MCNC: 181 MG/DL
CO2 SERPL-SCNC: 28 MMOL/L (ref 21–32)
CREAT SERPL-MCNC: 0.73 MG/DL (ref 0.6–1.3)
EOSINOPHIL # BLD AUTO: 0.27 THOUSAND/ÂΜL (ref 0–0.61)
EOSINOPHIL NFR BLD AUTO: 3 % (ref 0–6)
ERYTHROCYTE [DISTWIDTH] IN BLOOD BY AUTOMATED COUNT: 17.1 % (ref 11.6–15.1)
EST. AVERAGE GLUCOSE BLD GHB EST-MCNC: 143 MG/DL
FERRITIN SERPL-MCNC: 9 NG/ML (ref 8–388)
GFR SERPL CREATININE-BSD FRML MDRD: 104 ML/MIN/1.73SQ M
GLUCOSE P FAST SERPL-MCNC: 142 MG/DL (ref 65–99)
HBA1C MFR BLD: 6.6 %
HCT VFR BLD AUTO: 35.7 % (ref 34.8–46.1)
HDLC SERPL-MCNC: 42 MG/DL
HGB BLD-MCNC: 10.3 G/DL (ref 11.5–15.4)
IMM GRANULOCYTES # BLD AUTO: 0.08 THOUSAND/UL (ref 0–0.2)
IMM GRANULOCYTES NFR BLD AUTO: 1 % (ref 0–2)
IRON SATN MFR SERPL: 8 % (ref 15–50)
IRON SERPL-MCNC: 37 UG/DL (ref 50–170)
LDLC SERPL CALC-MCNC: 121 MG/DL (ref 0–100)
LYMPHOCYTES # BLD AUTO: 2.33 THOUSANDS/ÂΜL (ref 0.6–4.47)
LYMPHOCYTES NFR BLD AUTO: 23 % (ref 14–44)
MCH RBC QN AUTO: 22.2 PG (ref 26.8–34.3)
MCHC RBC AUTO-ENTMCNC: 28.9 G/DL (ref 31.4–37.4)
MCV RBC AUTO: 77 FL (ref 82–98)
MONOCYTES # BLD AUTO: 0.55 THOUSAND/ÂΜL (ref 0.17–1.22)
MONOCYTES NFR BLD AUTO: 6 % (ref 4–12)
NEUTROPHILS # BLD AUTO: 6.7 THOUSANDS/ÂΜL (ref 1.85–7.62)
NEUTS SEG NFR BLD AUTO: 66 % (ref 43–75)
NONHDLC SERPL-MCNC: 139 MG/DL
NRBC BLD AUTO-RTO: 0 /100 WBCS
PLATELET # BLD AUTO: 363 THOUSANDS/UL (ref 149–390)
PMV BLD AUTO: 9.3 FL (ref 8.9–12.7)
POTASSIUM SERPL-SCNC: 4.2 MMOL/L (ref 3.5–5.3)
PROT SERPL-MCNC: 8.1 G/DL (ref 6.4–8.4)
RBC # BLD AUTO: 4.64 MILLION/UL (ref 3.81–5.12)
SODIUM SERPL-SCNC: 138 MMOL/L (ref 135–147)
T4 FREE SERPL-MCNC: 0.78 NG/DL (ref 0.76–1.46)
TIBC SERPL-MCNC: 437 UG/DL (ref 250–450)
TRIGL SERPL-MCNC: 90 MG/DL
TSH SERPL DL<=0.05 MIU/L-ACNC: 11.8 UIU/ML (ref 0.45–4.5)
WBC # BLD AUTO: 10 THOUSAND/UL (ref 4.31–10.16)

## 2022-12-19 ENCOUNTER — OFFICE VISIT (OUTPATIENT)
Dept: FAMILY MEDICINE CLINIC | Facility: CLINIC | Age: 37
End: 2022-12-19

## 2022-12-19 VITALS
OXYGEN SATURATION: 99 % | HEIGHT: 66 IN | RESPIRATION RATE: 20 BRPM | SYSTOLIC BLOOD PRESSURE: 138 MMHG | TEMPERATURE: 98 F | BODY MASS INDEX: 47.09 KG/M2 | WEIGHT: 293 LBS | HEART RATE: 88 BPM | DIASTOLIC BLOOD PRESSURE: 78 MMHG

## 2022-12-19 DIAGNOSIS — I10 PRIMARY HYPERTENSION: ICD-10-CM

## 2022-12-19 DIAGNOSIS — R73.03 PREDIABETES: ICD-10-CM

## 2022-12-19 DIAGNOSIS — E03.9 HYPOTHYROIDISM, UNSPECIFIED TYPE: Primary | ICD-10-CM

## 2022-12-19 DIAGNOSIS — Z13.220 SCREENING CHOLESTEROL LEVEL: ICD-10-CM

## 2022-12-19 DIAGNOSIS — D50.8 OTHER IRON DEFICIENCY ANEMIA: ICD-10-CM

## 2022-12-19 DIAGNOSIS — F41.9 ANXIETY: ICD-10-CM

## 2022-12-19 DIAGNOSIS — K21.00 GASTROESOPHAGEAL REFLUX DISEASE WITH ESOPHAGITIS WITHOUT HEMORRHAGE: ICD-10-CM

## 2022-12-19 RX ORDER — ESCITALOPRAM OXALATE 20 MG/1
20 TABLET ORAL DAILY
Qty: 30 TABLET | Refills: 0 | Status: SHIPPED | OUTPATIENT
Start: 2022-12-19

## 2022-12-19 RX ORDER — OMEPRAZOLE 20 MG/1
20 CAPSULE, DELAYED RELEASE ORAL
Qty: 30 CAPSULE | Refills: 0 | Status: SHIPPED | OUTPATIENT
Start: 2022-12-19

## 2022-12-19 NOTE — PROGRESS NOTES
[facility]        NAME: Ericka Jang is a 42 y o  female  : 1985    MRN: [id number]  DATE: 2022  TIME: 11:40 AM    Assessment and Plan   Hypothyroidism, unspecified type [E03 9]  1  Hypothyroidism, unspecified type  TSH, 3rd generation with Free T4 reflex      2  Anxiety  escitalopram (LEXAPRO) 20 mg tablet      3  Gastroesophageal reflux disease with esophagitis without hemorrhage  omeprazole (PriLOSEC) 20 mg delayed release capsule    CBC and differential      4  Primary hypertension  Comprehensive metabolic panel      5  Other iron deficiency anemia  CBC and differential    Iron Panel (Includes Ferritin, Iron Sat%, Iron, and TIBC)      6  Prediabetes  HEMOGLOBIN A1C W/ EAG ESTIMATION      7  Screening cholesterol level  Lipid panel            Patient Instructions     Patient Instructions   Restart Lexapro 20mg daily  Restart Omeprazole 20mg daily  Both sent to [address] [place] [address] for a 30 days supply  Cut out all soda/sweet tea/sugary drinks/foods  Recheck blood work in 3-4 months, [facility]          Chief Complaint     Chief Complaint   Patient presents with   • Follow-up         History of Present Illness       Here for 6 month josé miguel-  Had gone without soda for 3 months then had just 1 and is now drinking it more often- HgA1c increased to 6 6,   Her TSH increased to 11  Weight is up 12 pounds in the last 2 months  She has been without her Lexapro and Omeprazole for 3 weeks due to a payment issue with her mailorder  She has been very anxious, crying, and stressed  Reports she is eating more than normal    At her last visit we discussed to stop Ambien due to side effects  She has been taking Ativan every other night for insomnia with good results  Review of Systems   Review of Systems   Constitutional: Positive for fatigue and unexpected weight change  Negative for activity change, diaphoresis and fever     HENT: Negative for congestion, facial swelling, hearing loss, rhinorrhea, sinus pressure, sinus pain, sneezing, sore throat and voice change  Eyes: Negative for discharge and visual disturbance  Respiratory: Negative for cough, choking, chest tightness, shortness of breath, wheezing and stridor  Cardiovascular: Negative for chest pain, palpitations and leg swelling  Gastrointestinal: Negative for abdominal distention, abdominal pain, constipation, diarrhea, nausea and vomiting  Endocrine: Negative for polydipsia, polyphagia and polyuria  Genitourinary: Negative for difficulty urinating, dysuria, frequency and urgency  Musculoskeletal: Negative for arthralgias, back pain, gait problem, joint swelling, myalgias, neck pain and neck stiffness  Skin: Negative for color change, rash and wound  Neurological: Negative for dizziness, syncope, speech difficulty, weakness, light-headedness and headaches  Hematological: Negative for adenopathy  Does not bruise/bleed easily  Psychiatric/Behavioral: Positive for sleep disturbance  Negative for agitation, behavioral problems, confusion, hallucinations and suicidal ideas  The patient is nervous/anxious            Current Medications       Current Outpatient Medications:   •  albuterol (ProAir HFA) 90 mcg/act inhaler, Inhale 2 puffs every 6 (six) hours as needed for wheezing, Disp: 18 g, Rfl: 3  •  diclofenac sodium (VOLTAREN) 50 mg EC tablet, Take 1 tablet (50 mg total) by mouth 2 (two) times a day as needed (pain), Disp: 90 tablet, Rfl: 1  •  escitalopram (LEXAPRO) 20 mg tablet, Take 1 tablet (20 mg total) by mouth daily, Disp: 30 tablet, Rfl: 0  •  fluticasone (FLONASE) 50 mcg/act nasal spray, 2 sprays into each nostril daily, Disp: 48 g, Rfl: 3  •  lisinopril (ZESTRIL) 20 mg tablet, Take 1 tablet (20 mg total) by mouth daily, Disp: 90 tablet, Rfl: 3  •  LORazepam (ATIVAN) 1 mg tablet, Take 1 tablet (1 mg total) by mouth daily at bedtime as needed for sleep, Disp: 90 tablet, Rfl: 0  •  LOW-DOSE ASPIRIN PO, , Disp: , Rfl:   •  montelukast (SINGULAIR) 10 mg tablet, Take 1 tablet (10 mg total) by mouth daily at bedtime, Disp: 90 tablet, Rfl: 3  •  omeprazole (PriLOSEC) 20 mg delayed release capsule, Take 1 capsule (20 mg total) by mouth daily before breakfast, Disp: 30 capsule, Rfl: 0  •  levocetirizine (XYZAL) 5 MG tablet, TAKE 1 TABLET EVERY EVENING, Disp: 90 tablet, Rfl: 3  •  mupirocin (BACTROBAN) 2 % ointment, Put into both nostrils at bedtime with a Qtip x 3 months, Disp: 30 g, Rfl: 2  •  predniSONE 20 mg tablet, 20mg BID x 5 days then 20mg daily x 5 days (Patient not taking: Reported on 2022), Disp: 15 tablet, Rfl: 0  •  Synthroid 200 MCG tablet, TAKE 1 TABLET DAILY, ADD [id number] FOR TOTAL OF 225MCG  DAILY, Disp: 90 tablet, Rfl: 3    Current Allergies     Allergies as of 2022 - Reviewed 2022   Allergen Reaction Noted   • Other  08/15/2018   • Vancomycin  08/15/2018            The following portions of the patient's history were reviewed and updated as appropriate: allergies, current medications, past family history, past medical history, past social history, past surgical history and problem list      Past Medical History:   Diagnosis Date   • Acute sinus infection    • Anxiety    • Asthma    • Depression    • Disease of thyroid gland    • GERD (gastroesophageal reflux disease)    • Heel spur     right   • Hypertension    • MRSA (methicillin resistant Staphylococcus aureus)     right leg & right leg inner thigh   • Neck pain    • Obesity    • Sleep disturbance        Past Surgical History:   Procedure Laterality Date   • BREAST BIOPSY Right     benign   •  SECTION      x1 live birth   •  SECTION     • LEG SURGERY Right     removal of MRSA   • THYROIDECTOMY         Family History   Problem Relation Age of Onset   • Cervical cancer Mother    • Hypertension Mother    • Hypertension Father    • Heart disease Maternal Uncle    • Breast cancer Paternal Grandmother    • No Known Problems Sister    • No Known Problems Daughter    • No Known Problems Maternal Grandmother    • No Known Problems Maternal Grandfather    • No Known Problems Paternal Grandfather    • No Known Problems Sister    • No Known Problems Daughter    • No Known Problems Paternal Aunt    • No Known Problems Paternal Aunt    • No Known Problems Paternal Aunt    • No Known Problems Paternal Aunt    • No Known Problems Paternal Aunt    • No Known Problems Paternal Aunt    • No Known Problems Paternal Aunt          Medications have been verified  Objective   /78   Pulse 88   Temp 98 °F (36 7 °C) (Tympanic)   Resp 20   Ht 5' 6" (1 676 m)   Wt (!) 142 kg (314 lb)   SpO2 99%   BMI 50 68 kg/m²        Physical Exam     Physical Exam  Vitals and nursing note reviewed  Constitutional:       General: She is not in acute distress  Appearance: She is well-developed  She is not diaphoretic  Neck:      Thyroid: No thyromegaly (thyroid surgically removed)  Trachea: No tracheal deviation  Cardiovascular:      Rate and Rhythm: Normal rate and regular rhythm  Heart sounds: Normal heart sounds  Pulmonary:      Effort: Pulmonary effort is normal  No respiratory distress  Breath sounds: Normal breath sounds  No wheezing  Musculoskeletal:         General: No tenderness or deformity  Normal range of motion  Cervical back: Normal range of motion  Skin:     General: Skin is warm and dry  Findings: No erythema or rash  Neurological:      Mental Status: She is alert and oriented to person, place, and time  Psychiatric:         Attention and Perception: Attention normal          Mood and Affect: Mood is anxious  Affect is tearful  Speech: Speech normal          Behavior: Behavior normal  Behavior is cooperative  Thought Content:  Thought content normal          Cognition and Memory: Cognition and memory normal          Judgment: Judgment normal

## 2022-12-19 NOTE — PATIENT INSTRUCTIONS
Restart Lexapro 20mg daily  Restart Omeprazole 20mg daily  Both sent to [address] [place] [address] for a 30 days supply  Cut out all soda/sweet tea/sugary drinks/foods  Recheck blood work in 3-4 months, return in 4 months for lab review and [facility]

## 2022-12-25 DIAGNOSIS — J30.1 SEASONAL ALLERGIC RHINITIS DUE TO POLLEN: ICD-10-CM

## 2022-12-25 RX ORDER — LEVOCETIRIZINE DIHYDROCHLORIDE 5 MG/1
TABLET, FILM COATED ORAL
Qty: 90 TABLET | Refills: 3 | Status: SHIPPED | OUTPATIENT
Start: 2022-12-25

## 2023-01-03 DIAGNOSIS — I10 PRIMARY HYPERTENSION: Primary | ICD-10-CM

## 2023-01-03 RX ORDER — HYDROCHLOROTHIAZIDE 25 MG/1
25 TABLET ORAL DAILY
Qty: 90 TABLET | Refills: 3 | Status: SHIPPED | OUTPATIENT
Start: 2023-01-03

## 2023-01-12 DIAGNOSIS — F41.9 ANXIETY: ICD-10-CM

## 2023-01-12 DIAGNOSIS — K21.00 GASTROESOPHAGEAL REFLUX DISEASE WITH ESOPHAGITIS WITHOUT HEMORRHAGE: ICD-10-CM

## 2023-01-12 RX ORDER — OMEPRAZOLE 20 MG/1
CAPSULE, DELAYED RELEASE ORAL
Qty: 30 CAPSULE | Refills: 0 | Status: SHIPPED | OUTPATIENT
Start: 2023-01-12

## 2023-01-12 RX ORDER — ESCITALOPRAM OXALATE 20 MG/1
TABLET ORAL
Qty: 30 TABLET | Refills: 0 | Status: SHIPPED | OUTPATIENT
Start: 2023-01-12

## 2023-02-11 DIAGNOSIS — F41.9 ANXIETY: ICD-10-CM

## 2023-02-11 DIAGNOSIS — K21.00 GASTROESOPHAGEAL REFLUX DISEASE WITH ESOPHAGITIS WITHOUT HEMORRHAGE: ICD-10-CM

## 2023-02-11 RX ORDER — OMEPRAZOLE 20 MG/1
CAPSULE, DELAYED RELEASE ORAL
Qty: 30 CAPSULE | Refills: 0 | Status: SHIPPED | OUTPATIENT
Start: 2023-02-11

## 2023-02-11 RX ORDER — ESCITALOPRAM OXALATE 20 MG/1
TABLET ORAL
Qty: 30 TABLET | Refills: 0 | Status: SHIPPED | OUTPATIENT
Start: 2023-02-11

## 2023-02-13 ENCOUNTER — OFFICE VISIT (OUTPATIENT)
Dept: FAMILY MEDICINE CLINIC | Facility: CLINIC | Age: 38
End: 2023-02-13

## 2023-02-13 VITALS
HEIGHT: 66 IN | RESPIRATION RATE: 20 BRPM | OXYGEN SATURATION: 99 % | HEART RATE: 96 BPM | WEIGHT: 293 LBS | BODY MASS INDEX: 47.09 KG/M2 | TEMPERATURE: 100.1 F | SYSTOLIC BLOOD PRESSURE: 136 MMHG | DIASTOLIC BLOOD PRESSURE: 78 MMHG

## 2023-02-13 DIAGNOSIS — B34.9 ACUTE VIRAL SYNDROME: Primary | ICD-10-CM

## 2023-02-13 DIAGNOSIS — J30.9 ALLERGIC RHINITIS WITH POSTNASAL DRIP: ICD-10-CM

## 2023-02-13 DIAGNOSIS — R09.82 ALLERGIC RHINITIS WITH POSTNASAL DRIP: ICD-10-CM

## 2023-02-13 DIAGNOSIS — R05.1 ACUTE COUGH: ICD-10-CM

## 2023-02-13 DIAGNOSIS — J45.20 MILD INTERMITTENT ASTHMA WITHOUT COMPLICATION: ICD-10-CM

## 2023-02-13 RX ORDER — PREDNISONE 20 MG/1
20 TABLET ORAL 2 TIMES DAILY WITH MEALS
Qty: 10 TABLET | Refills: 0 | Status: SHIPPED | OUTPATIENT
Start: 2023-02-13 | End: 2023-02-18

## 2023-02-13 RX ORDER — FLUTICASONE PROPIONATE 50 MCG
2 SPRAY, SUSPENSION (ML) NASAL DAILY
Qty: 48 G | Refills: 3 | Status: SHIPPED | OUTPATIENT
Start: 2023-02-13

## 2023-02-13 RX ORDER — DEXTROMETHORPHAN HYDROBROMIDE AND PROMETHAZINE HYDROCHLORIDE 15; 6.25 MG/5ML; MG/5ML
5 SOLUTION ORAL 4 TIMES DAILY PRN
Qty: 240 ML | Refills: 1 | Status: SHIPPED | OUTPATIENT
Start: 2023-02-13

## 2023-02-13 RX ORDER — ALBUTEROL SULFATE 90 UG/1
2 AEROSOL, METERED RESPIRATORY (INHALATION) EVERY 6 HOURS PRN
Qty: 18 G | Refills: 3 | Status: SHIPPED | OUTPATIENT
Start: 2023-02-13

## 2023-02-13 NOTE — PATIENT INSTRUCTIONS
Please take your medications as ordered  Start flonase as directed  You can use your inhaler as needed  Please take prednisone as directed  You can continue to take OTC medication, tylenol, ibuprofen as needed  Rest, increase fluids  Call with any new or worsening symptoms  Return for schedule appointment or as needed

## 2023-02-13 NOTE — PROGRESS NOTES
Answers for HPI/ROS submitted by the patient on 2023  Chronicity: new  Onset: in the past 7 days  Progression since onset: waxing and waning  Frequency: hourly  Cough characteristics: productive of sputum  chest pain: No  chills: No  ear congestion: Yes  ear pain: No  fever: No  headaches: No  heartburn: No  hemoptysis: No  myalgias: No  nasal congestion: Yes  postnasal drip: Yes  rash: No  rhinorrhea: Yes  shortness of breath: No  sore throat: No  sweats: No  weight loss: No  wheezing: No  Aggravated by: nothing  Risk factors for lung disease: animal exposure    [facility]        NAME: Lluvia Downey is a 42 y o  female  : 1985    MRN: [id number]  DATE: 2023  TIME: 2:16 PM    Assessment and Plan   Acute viral syndrome [B34 9]  1  Acute viral syndrome  predniSONE 20 mg tablet    albuterol (ProAir HFA) 90 mcg/act inhaler    fluticasone (FLONASE) 50 mcg/act nasal spray    Covid/Flu- Office Collect      2  Mild intermittent asthma without complication  Covid/Flu- Office Collect      3  Allergic rhinitis with postnasal drip  fluticasone (FLONASE) 50 mcg/act nasal spray    Covid/Flu- Office Collect      4  Acute cough  predniSONE 20 mg tablet    albuterol (ProAir HFA) 90 mcg/act inhaler    Promethazine-DM (PHENERGAN-DM) 6 25-15 mg/5 mL oral syrup    Covid/Flu- Office Collect            Patient Instructions     Patient Instructions   Please take your medications as ordered  Start flonase as directed  You can use your inhaler as needed  Please take prednisone as directed  You can continue to take OTC medication, tylenol, ibuprofen as needed  Rest, increase fluids  Call with any new or worsening symptoms  Return for schedule appointment or as needed          Chief Complaint     Chief Complaint   Patient presents with   • Cough         History of Present Illness       Friday - congestion started   Cough started Saturday, so bad when she has a coughing fit that she has pain in her ribs and abdomen  Cough is productive, yellow/white frothy sputum  Cough keeps her up at night  Denies fever, chills, fatigue, sore throat, denies SOB  Has had diarrhea, mild nausea, no vomiting - this started today  Has used cough drops and cold and flu OTC and there was mild improvement  Pt reports she has COVID about 4 weeks ago,  had COVID two weeks ago  Daughter diagnosed with strep about 1 week ago  Pt took two home COVID tests since onset of symptoms, both negative  Has a hx of asthma, enlarged tonsils, uses PRN inhaler but is out  Will reorder  Pt would like to be tested for flu / COVID    Cough  This is a new problem  The current episode started in the past 7 days (Friday)  The problem has been waxing and waning  The problem occurs hourly  The cough is productive of sputum  Associated symptoms include ear congestion, nasal congestion, postnasal drip and rhinorrhea  Pertinent negatives include no chest pain, chills, ear pain, fever, headaches, heartburn, hemoptysis, myalgias, rash, sore throat, shortness of breath, sweats, weight loss or wheezing  Nothing aggravates the symptoms  Risk factors for lung disease include animal exposure  She has tried OTC cough suppressant for the symptoms  The treatment provided mild relief  Review of Systems   Review of Systems   Constitutional: Negative for chills, fever and weight loss  HENT: Positive for congestion, postnasal drip and rhinorrhea  Negative for ear pain, sinus pressure, sinus pain and sore throat  Respiratory: Positive for cough  Negative for hemoptysis, shortness of breath and wheezing  Cardiovascular: Negative for chest pain  Gastrointestinal: Positive for diarrhea and nausea  Negative for heartburn and vomiting  Musculoskeletal: Negative for myalgias  Skin: Negative for rash  Neurological: Negative for headaches           Current Medications       Current Outpatient Medications:   •  albuterol (ProAir HFA) 90 mcg/act inhaler, Inhale 2 puffs every 6 (six) hours as needed for wheezing, Disp: 18 g, Rfl: 3  •  diclofenac sodium (VOLTAREN) 50 mg EC tablet, Take 1 tablet (50 mg total) by mouth 2 (two) times a day as needed (pain), Disp: 90 tablet, Rfl: 1  •  escitalopram (LEXAPRO) 20 mg tablet, take 1 tablet by mouth once daily, Disp: 30 tablet, Rfl: 0  •  fluticasone (FLONASE) 50 mcg/act nasal spray, 2 sprays into each nostril daily, Disp: 48 g, Rfl: 3  •  hydrochlorothiazide (HYDRODIURIL) 25 mg tablet, Take 1 tablet (25 mg total) by mouth daily, Disp: 90 tablet, Rfl: 3  •  levocetirizine (XYZAL) 5 MG tablet, TAKE 1 TABLET EVERY EVENING, Disp: 90 tablet, Rfl: 3  •  lisinopril (ZESTRIL) 20 mg tablet, Take 1 tablet (20 mg total) by mouth daily, Disp: 90 tablet, Rfl: 3  •  LORazepam (ATIVAN) 1 mg tablet, Take 1 tablet (1 mg total) by mouth daily at bedtime as needed for sleep, Disp: 90 tablet, Rfl: 0  •  LOW-DOSE ASPIRIN PO, , Disp: , Rfl:   •  montelukast (SINGULAIR) 10 mg tablet, Take 1 tablet (10 mg total) by mouth daily at bedtime, Disp: 90 tablet, Rfl: 3  •  omeprazole (PriLOSEC) 20 mg delayed release capsule, take 1 capsule by mouth daily before breakfast, Disp: 30 capsule, Rfl: 0  •  predniSONE 20 mg tablet, Take 1 tablet (20 mg total) by mouth 2 (two) times a day with meals for 5 days, Disp: 10 tablet, Rfl: 0  •  Promethazine-DM (PHENERGAN-DM) 6 25-15 mg/5 mL oral syrup, Take 5 mL by mouth 4 (four) times a day as needed for cough, Disp: 240 mL, Rfl: 1  •  Synthroid 200 MCG tablet, Take 1 tablet (200 mcg total) by mouth daily, Disp: 90 tablet, Rfl: 3  •  mupirocin (BACTROBAN) 2 % ointment, Put into both nostrils at bedtime with a Qtip x 3 months, Disp: 30 g, Rfl: 2    Current Allergies     Allergies as of 02/13/2023 - Reviewed 02/13/2023   Allergen Reaction Noted   • Other  08/15/2018   • Vancomycin  08/15/2018            The following portions of the patient's history were reviewed and updated as appropriate: allergies, current medications, past family history, past medical history, past social history, past surgical history and problem list      Past Medical History:   Diagnosis Date   • Acute sinus infection    • Anxiety    • Asthma    • Depression    • Disease of thyroid gland    • GERD (gastroesophageal reflux disease)    • Heel spur     right   • Hypertension    • MRSA (methicillin resistant Staphylococcus aureus)     right leg & right leg inner thigh   • Neck pain    • Obesity    • Sleep disturbance        Past Surgical History:   Procedure Laterality Date   • BREAST BIOPSY Right     benign   •  SECTION      x1 live birth   •  SECTION     • LEG SURGERY Right 2015    removal of MRSA   • THYROIDECTOMY         Family History   Problem Relation Age of Onset   • Cervical cancer Mother    • Hypertension Mother    • Hypertension Father    • Heart disease Maternal Uncle    • Breast cancer Paternal Grandmother    • No Known Problems Sister    • No Known Problems Daughter    • No Known Problems Maternal Grandmother    • No Known Problems Maternal Grandfather    • No Known Problems Paternal Grandfather    • No Known Problems Sister    • No Known Problems Daughter    • No Known Problems Paternal Aunt    • No Known Problems Paternal Aunt    • No Known Problems Paternal Aunt    • No Known Problems Paternal Aunt    • No Known Problems Paternal Aunt    • No Known Problems Paternal Aunt    • No Known Problems Paternal Aunt          Medications have been verified  Objective   /78   Pulse 96   Temp 100 1 °F (37 8 °C) (Tympanic)   Resp 20   Ht 5' 6" (1 676 m)   Wt (!) 138 kg (304 lb 12 8 oz)   SpO2 99%   BMI 49 20 kg/m²        Physical Exam     Physical Exam  Vitals and nursing note reviewed  Constitutional:       General: She is not in acute distress  Appearance: She is well-developed  She is obese  She is not diaphoretic     HENT:      Right Ear: Tympanic membrane, ear canal and external ear normal  Left Ear: Tympanic membrane, ear canal and external ear normal       Nose: Congestion and rhinorrhea present  Mouth/Throat:      Mouth: Mucous membranes are moist       Pharynx: No oropharyngeal exudate or posterior oropharyngeal erythema  Neck:      Thyroid: No thyromegaly  Trachea: No tracheal deviation  Cardiovascular:      Rate and Rhythm: Normal rate and regular rhythm  Heart sounds: Normal heart sounds  No murmur heard  Pulmonary:      Effort: Pulmonary effort is normal  No respiratory distress  Breath sounds: Normal breath sounds  No wheezing  Comments: Dry cough noted during physical exam  Musculoskeletal:         General: Normal range of motion  Cervical back: Normal range of motion and neck supple  Lymphadenopathy:      Cervical: No cervical adenopathy  Skin:     General: Skin is warm and dry  Findings: No erythema or rash  Neurological:      Mental Status: She is alert and oriented to person, place, and time  Psychiatric:         Behavior: Behavior normal  Behavior is cooperative  Thought Content:  Thought content normal          Judgment: Judgment normal

## 2023-02-14 LAB
FLUAV RNA RESP QL NAA+PROBE: NEGATIVE
FLUBV RNA RESP QL NAA+PROBE: NEGATIVE
SARS-COV-2 RNA RESP QL NAA+PROBE: NEGATIVE

## 2023-03-19 DIAGNOSIS — K21.00 GASTROESOPHAGEAL REFLUX DISEASE WITH ESOPHAGITIS WITHOUT HEMORRHAGE: ICD-10-CM

## 2023-03-19 DIAGNOSIS — E03.9 HYPOTHYROIDISM, UNSPECIFIED TYPE: ICD-10-CM

## 2023-03-19 DIAGNOSIS — F41.9 ANXIETY: ICD-10-CM

## 2023-03-19 RX ORDER — OMEPRAZOLE 20 MG/1
CAPSULE, DELAYED RELEASE ORAL
Qty: 30 CAPSULE | Refills: 0 | Status: SHIPPED | OUTPATIENT
Start: 2023-03-19

## 2023-03-19 RX ORDER — ESCITALOPRAM OXALATE 20 MG/1
TABLET ORAL
Qty: 30 TABLET | Refills: 0 | Status: SHIPPED | OUTPATIENT
Start: 2023-03-19

## 2023-03-20 RX ORDER — LEVOTHYROXINE SODIUM 200 MCG
200 TABLET ORAL DAILY
Qty: 90 TABLET | Refills: 0 | Status: SHIPPED | OUTPATIENT
Start: 2023-03-20

## 2023-04-03 ENCOUNTER — APPOINTMENT (OUTPATIENT)
Dept: LAB | Facility: CLINIC | Age: 38
End: 2023-04-03

## 2023-04-03 DIAGNOSIS — B96.89 BACTERIAL URI: Primary | ICD-10-CM

## 2023-04-03 DIAGNOSIS — E03.9 HYPOTHYROIDISM, UNSPECIFIED TYPE: ICD-10-CM

## 2023-04-03 DIAGNOSIS — R73.03 PREDIABETES: ICD-10-CM

## 2023-04-03 DIAGNOSIS — Z13.220 SCREENING CHOLESTEROL LEVEL: ICD-10-CM

## 2023-04-03 DIAGNOSIS — I10 PRIMARY HYPERTENSION: ICD-10-CM

## 2023-04-03 DIAGNOSIS — J06.9 BACTERIAL URI: Primary | ICD-10-CM

## 2023-04-03 DIAGNOSIS — K21.00 GASTROESOPHAGEAL REFLUX DISEASE WITH ESOPHAGITIS WITHOUT HEMORRHAGE: ICD-10-CM

## 2023-04-03 DIAGNOSIS — D50.8 OTHER IRON DEFICIENCY ANEMIA: ICD-10-CM

## 2023-04-03 LAB
ALBUMIN SERPL BCP-MCNC: 3.4 G/DL (ref 3.5–5)
ALP SERPL-CCNC: 95 U/L (ref 46–116)
ALT SERPL W P-5'-P-CCNC: 17 U/L (ref 12–78)
ANION GAP SERPL CALCULATED.3IONS-SCNC: 2 MMOL/L (ref 4–13)
AST SERPL W P-5'-P-CCNC: 10 U/L (ref 5–45)
BASOPHILS # BLD AUTO: 0.08 THOUSANDS/ÂΜL (ref 0–0.1)
BASOPHILS NFR BLD AUTO: 1 % (ref 0–1)
BILIRUB SERPL-MCNC: 0.21 MG/DL (ref 0.2–1)
BUN SERPL-MCNC: 13 MG/DL (ref 5–25)
CALCIUM ALBUM COR SERPL-MCNC: 9 MG/DL (ref 8.3–10.1)
CALCIUM SERPL-MCNC: 8.5 MG/DL (ref 8.3–10.1)
CHLORIDE SERPL-SCNC: 106 MMOL/L (ref 96–108)
CHOLEST SERPL-MCNC: 173 MG/DL
CO2 SERPL-SCNC: 29 MMOL/L (ref 21–32)
CREAT SERPL-MCNC: 0.73 MG/DL (ref 0.6–1.3)
EOSINOPHIL # BLD AUTO: 0.18 THOUSAND/ÂΜL (ref 0–0.61)
EOSINOPHIL NFR BLD AUTO: 2 % (ref 0–6)
ERYTHROCYTE [DISTWIDTH] IN BLOOD BY AUTOMATED COUNT: 18.9 % (ref 11.6–15.1)
EST. AVERAGE GLUCOSE BLD GHB EST-MCNC: 134 MG/DL
FERRITIN SERPL-MCNC: 7 NG/ML (ref 8–388)
GFR SERPL CREATININE-BSD FRML MDRD: 103 ML/MIN/1.73SQ M
GLUCOSE P FAST SERPL-MCNC: 139 MG/DL (ref 65–99)
HBA1C MFR BLD: 6.3 %
HCT VFR BLD AUTO: 35.3 % (ref 34.8–46.1)
HDLC SERPL-MCNC: 39 MG/DL
HGB BLD-MCNC: 10.3 G/DL (ref 11.5–15.4)
IMM GRANULOCYTES # BLD AUTO: 0.04 THOUSAND/UL (ref 0–0.2)
IMM GRANULOCYTES NFR BLD AUTO: 1 % (ref 0–2)
LDLC SERPL CALC-MCNC: 117 MG/DL (ref 0–100)
LYMPHOCYTES # BLD AUTO: 2.21 THOUSANDS/ÂΜL (ref 0.6–4.47)
LYMPHOCYTES NFR BLD AUTO: 25 % (ref 14–44)
MCH RBC QN AUTO: 22 PG (ref 26.8–34.3)
MCHC RBC AUTO-ENTMCNC: 29.2 G/DL (ref 31.4–37.4)
MCV RBC AUTO: 75 FL (ref 82–98)
MONOCYTES # BLD AUTO: 0.45 THOUSAND/ÂΜL (ref 0.17–1.22)
MONOCYTES NFR BLD AUTO: 5 % (ref 4–12)
NEUTROPHILS # BLD AUTO: 5.74 THOUSANDS/ÂΜL (ref 1.85–7.62)
NEUTS SEG NFR BLD AUTO: 66 % (ref 43–75)
NONHDLC SERPL-MCNC: 134 MG/DL
NRBC BLD AUTO-RTO: 0 /100 WBCS
PLATELET # BLD AUTO: 370 THOUSANDS/UL (ref 149–390)
PMV BLD AUTO: 9.3 FL (ref 8.9–12.7)
POTASSIUM SERPL-SCNC: 4.4 MMOL/L (ref 3.5–5.3)
PROT SERPL-MCNC: 7.8 G/DL (ref 6.4–8.4)
RBC # BLD AUTO: 4.68 MILLION/UL (ref 3.81–5.12)
SODIUM SERPL-SCNC: 137 MMOL/L (ref 135–147)
T4 FREE SERPL-MCNC: 0.55 NG/DL (ref 0.76–1.46)
TRIGL SERPL-MCNC: 83 MG/DL
TSH SERPL DL<=0.05 MIU/L-ACNC: 29.6 UIU/ML (ref 0.45–4.5)
WBC # BLD AUTO: 8.7 THOUSAND/UL (ref 4.31–10.16)

## 2023-04-03 RX ORDER — AZITHROMYCIN 250 MG/1
TABLET, FILM COATED ORAL
Qty: 6 TABLET | Refills: 0 | Status: SHIPPED | OUTPATIENT
Start: 2023-04-03 | End: 2023-04-07

## 2023-04-03 RX ORDER — COVID-19 ANTIGEN TEST
KIT MISCELLANEOUS
COMMUNITY
Start: 2023-03-23

## 2023-04-06 ENCOUNTER — OFFICE VISIT (OUTPATIENT)
Dept: FAMILY MEDICINE CLINIC | Facility: CLINIC | Age: 38
End: 2023-04-06

## 2023-04-06 VITALS
WEIGHT: 293 LBS | SYSTOLIC BLOOD PRESSURE: 138 MMHG | HEIGHT: 66 IN | HEART RATE: 88 BPM | OXYGEN SATURATION: 99 % | DIASTOLIC BLOOD PRESSURE: 74 MMHG | BODY MASS INDEX: 47.09 KG/M2 | RESPIRATION RATE: 20 BRPM | TEMPERATURE: 98 F

## 2023-04-06 DIAGNOSIS — K21.00 GASTROESOPHAGEAL REFLUX DISEASE WITH ESOPHAGITIS WITHOUT HEMORRHAGE: ICD-10-CM

## 2023-04-06 DIAGNOSIS — R09.82 ALLERGIC RHINITIS WITH POSTNASAL DRIP: ICD-10-CM

## 2023-04-06 DIAGNOSIS — J30.9 ALLERGIC RHINITIS WITH POSTNASAL DRIP: ICD-10-CM

## 2023-04-06 DIAGNOSIS — F41.9 ANXIETY: ICD-10-CM

## 2023-04-06 DIAGNOSIS — E03.9 HYPOTHYROIDISM, UNSPECIFIED TYPE: Primary | ICD-10-CM

## 2023-04-06 DIAGNOSIS — R73.03 PREDIABETES: ICD-10-CM

## 2023-04-06 DIAGNOSIS — Z12.31 ENCOUNTER FOR SCREENING MAMMOGRAM FOR MALIGNANT NEOPLASM OF BREAST: ICD-10-CM

## 2023-04-06 DIAGNOSIS — D50.8 OTHER IRON DEFICIENCY ANEMIA: ICD-10-CM

## 2023-04-06 DIAGNOSIS — R21 SKIN RASH: ICD-10-CM

## 2023-04-06 DIAGNOSIS — J32.0 CHRONIC MAXILLARY SINUSITIS: ICD-10-CM

## 2023-04-06 DIAGNOSIS — I10 PRIMARY HYPERTENSION: ICD-10-CM

## 2023-04-06 DIAGNOSIS — E89.0 H/O TOTAL THYROIDECTOMY: ICD-10-CM

## 2023-04-06 DIAGNOSIS — E66.01 CLASS 3 SEVERE OBESITY DUE TO EXCESS CALORIES WITH SERIOUS COMORBIDITY AND BODY MASS INDEX (BMI) OF 40.0 TO 44.9 IN ADULT (HCC): ICD-10-CM

## 2023-04-06 LAB
IRON SATN MFR SERPL: 5 % (ref 15–50)
IRON SERPL-MCNC: 22 UG/DL (ref 50–170)
TIBC SERPL-MCNC: 449 UG/DL (ref 250–450)

## 2023-04-06 RX ORDER — TRIAMCINOLONE ACETONIDE 1 MG/G
CREAM TOPICAL 2 TIMES DAILY
Qty: 30 G | Refills: 0 | Status: SHIPPED | OUTPATIENT
Start: 2023-04-06

## 2023-04-06 NOTE — PATIENT INSTRUCTIONS
Continue same medications  Restart Synthroid ASAP, recheck bloodwork before next visit in 3-4 months  Triamcinolone cream to skin scars- consider dermatology consult  Mammogram ordered  4 month [facility]- get bloodwork before this visit  Call sooner for problems/concerns

## 2023-04-06 NOTE — LETTER
To whom it concerns:    Gómez Hamlin is under my medical professional care  She is medically treated for anxiety and would benefit from having an emotional support dog to assist her in her anxiety symptoms  Please call with any questions,        Júnior Sterling

## 2023-04-06 NOTE — PROGRESS NOTES
[facility]        NAME: Ivan De La Paz is a 34 y o  female  : 1985    MRN: [id number]  DATE: 2023  TIME: 8:40 AM    Assessment and Plan   Hypothyroidism, unspecified type [E03 9]  1  Hypothyroidism, unspecified type  TSH, 3rd generation with Free T4 reflex      2  Encounter for screening mammogram for malignant neoplasm of breast  Mammo screening bilateral w 3d & cad      3  Anxiety  Comprehensive metabolic panel      4  Gastroesophageal reflux disease with esophagitis without hemorrhage        5  Prediabetes  HEMOGLOBIN A1C W/ EAG ESTIMATION      6  Other iron deficiency anemia  Iron Panel (Includes Ferritin, Iron Sat%, Iron, and TIBC)    CBC and differential      7  Skin rash  triamcinolone (KENALOG) 0 1 % cream      8  H/O total thyroidectomy        9  Primary hypertension        10  Chronic maxillary sinusitis        11  Allergic rhinitis with postnasal drip        12  Class 3 severe obesity due to excess calories with serious comorbidity and body mass index (BMI) of 40 0 to 44 9 in adult [facility])              Patient Instructions     Patient Instructions   Continue same medications  Restart Synthroid ASAP, recheck bloodwork before next visit in 3-4 months  Triamcinolone cream to skin scars- consider dermatology consult  Mammogram ordered  4 month [facility]- get bloodwork before this visit  Call sooner for problems/concerns    BMI Counseling: Body mass index is 49 55 kg/m²  The BMI is above normal  Nutrition recommendations include decreasing portion sizes, encouraging healthy choices of fruits and vegetables, decreasing fast food intake, consuming healthier snacks, limiting drinks that contain sugar, moderation in carbohydrate intake and increasing intake of lean protein  Exercise recommendations include exercising 3-5 times per week  Rationale for BMI follow-up plan is due to patient being overweight or obese       Depression Screening and Follow-up Plan: Patient was screened for depression during today's encounter  They screened negative with a PHQ-2 score of 0  Chief Complaint     Chief Complaint   Patient presents with   • Follow-up         History of Present Illness       Here for 6 month josé miguel-   Has been out of her Synthroid for over 1 1/2 months- TSH is 29 6  There was a mixup with billing the wrong credit card and was told she couldn't have it by the pharmacy  She was told she should have it by Monday or Tuesday at the latest  She reports having up and down energy  Yesterday she had a hard time getting out of bed and wanting to sleep all the time  She cut out soda and has been going for walks, also eating better  Just found out her and her family have 60 days to be out of their rental home, she is feeling increased stress from this  Recently both her and her 's cars broke down so they had to get a new car  She reports she is putting her Hematology consult and Tonsil removal on hold until this is worked out  Needs a note for her dog to be trained as an emotional support dog for her anxiety- letter entered into chart  Feeling better since starting antibiotic on Monday for sinusitis    Reports skin lumps on her leg and arm- starts as what looks like an ingrown hair and ends as scar/kelloid       Review of Systems   Review of Systems   Constitutional: Negative for activity change, diaphoresis, fatigue and fever  HENT: Positive for congestion and rhinorrhea  Negative for facial swelling, hearing loss, sinus pressure, sinus pain, sneezing, sore throat and voice change  Eyes: Negative for discharge and visual disturbance  Respiratory: Negative for cough, choking, chest tightness, shortness of breath, wheezing and stridor  Cardiovascular: Negative for chest pain, palpitations and leg swelling  Gastrointestinal: Negative for abdominal distention, abdominal pain, constipation, diarrhea, nausea and vomiting     Endocrine: Negative for polydipsia, polyphagia and polyuria  Genitourinary: Negative for difficulty urinating, dysuria, frequency and urgency  Musculoskeletal: Negative for arthralgias, back pain, gait problem, joint swelling, myalgias, neck pain and neck stiffness  Skin: Negative for color change, rash and wound  Neurological: Negative for syncope, speech difficulty and weakness  Hematological: Negative for adenopathy  Does not bruise/bleed easily  Psychiatric/Behavioral: Positive for dysphoric mood and sleep disturbance  Negative for agitation, behavioral problems, confusion, hallucinations and suicidal ideas  The patient is nervous/anxious            Current Medications       Current Outpatient Medications:   •  azithromycin (ZITHROMAX) 250 mg tablet, Take 2 tablets today then 1 tablet daily x 4 days, Disp: 6 tablet, Rfl: 0  •  diclofenac sodium (VOLTAREN) 50 mg EC tablet, Take 1 tablet (50 mg total) by mouth 2 (two) times a day as needed (pain), Disp: 90 tablet, Rfl: 1  •  escitalopram (LEXAPRO) 20 mg tablet, take 1 tablet by mouth once daily, Disp: 30 tablet, Rfl: 0  •  hydrochlorothiazide (HYDRODIURIL) 25 mg tablet, Take 1 tablet (25 mg total) by mouth daily, Disp: 90 tablet, Rfl: 3  •  levocetirizine (XYZAL) 5 MG tablet, TAKE 1 TABLET EVERY EVENING, Disp: 90 tablet, Rfl: 3  •  lisinopril (ZESTRIL) 20 mg tablet, Take 1 tablet (20 mg total) by mouth daily, Disp: 90 tablet, Rfl: 3  •  LOW-DOSE ASPIRIN PO, , Disp: , Rfl:   •  montelukast (SINGULAIR) 10 mg tablet, Take 1 tablet (10 mg total) by mouth daily at bedtime, Disp: 90 tablet, Rfl: 3  •  omeprazole (PriLOSEC) 20 mg delayed release capsule, take 1 capsule by mouth daily before breakfast, Disp: 30 capsule, Rfl: 0  •  Synthroid 200 MCG tablet, Take 1 tablet (200 mcg total) by mouth daily, Disp: 90 tablet, Rfl: 0  •  triamcinolone (KENALOG) 0 1 % cream, Apply topically 2 (two) times a day, Disp: 30 g, Rfl: 0  •  albuterol (ProAir HFA) 90 mcg/act inhaler, Inhale 2 puffs every 6 (six) hours as needed for wheezing, Disp: 18 g, Rfl: 3  •  Flowflex COVID-19 Ag Home Test KIT, , Disp: , Rfl:   •  fluticasone (FLONASE) 50 mcg/act nasal spray, 2 sprays into each nostril daily, Disp: 48 g, Rfl: 3  •  LORazepam (ATIVAN) 1 mg tablet, Take 1 tablet (1 mg total) by mouth daily at bedtime as needed for sleep, Disp: 90 tablet, Rfl: 0  •  mupirocin (BACTROBAN) 2 % ointment, Put into both nostrils at bedtime with a Qtip x 3 months, Disp: 30 g, Rfl: 2  •  Promethazine-DM (PHENERGAN-DM) 6 25-15 mg/5 mL oral syrup, Take 5 mL by mouth 4 (four) times a day as needed for cough (Patient not taking: Reported on 2023), Disp: 240 mL, Rfl: 1    Current Allergies     Allergies as of 2023 - Reviewed 2023   Allergen Reaction Noted   • Other  08/15/2018   • Vancomycin  08/15/2018            The following portions of the patient's history were reviewed and updated as appropriate: allergies, current medications, past family history, past medical history, past social history, past surgical history and problem list      Past Medical History:   Diagnosis Date   • Anxiety    • Asthma    • Disease of thyroid gland    • GERD (gastroesophageal reflux disease)    • Hypertension    • MRSA (methicillin resistant Staphylococcus aureus)     right leg & right leg inner thigh       Past Surgical History:   Procedure Laterality Date   • BREAST BIOPSY Right     benign   •  SECTION      x1 live birth   •  SECTION     • LEG SURGERY Right     removal of MRSA   • THYROIDECTOMY         Family History   Problem Relation Age of Onset   • Cervical cancer Mother    • Hypertension Mother    • Hypertension Father    • Heart disease Maternal Uncle    • Breast cancer Paternal Grandmother    • No Known Problems Sister    • No Known Problems Daughter    • No Known Problems Maternal Grandmother    • No Known Problems Maternal Grandfather    • No Known Problems Paternal Grandfather    • No Known Problems Sister    • No "Known Problems Daughter    • No Known Problems Paternal Aunt    • No Known Problems Paternal Aunt    • No Known Problems Paternal Aunt    • No Known Problems Paternal Aunt    • No Known Problems Paternal Aunt    • No Known Problems Paternal Aunt    • No Known Problems Paternal Aunt          Medications have been verified  Objective   /74   Pulse 88   Temp 98 °F (36 7 °C) (Tympanic)   Resp 20   Ht 5' 6\" (1 676 m)   Wt (!) 139 kg (307 lb)   SpO2 99%   BMI 49 55 kg/m²        Physical Exam     Physical Exam  Vitals and nursing note reviewed  Constitutional:       General: She is not in acute distress  Appearance: She is well-developed  She is not diaphoretic  HENT:      Head: Normocephalic and atraumatic  Right Ear: Tympanic membrane, ear canal and external ear normal       Left Ear: Tympanic membrane, ear canal and external ear normal       Nose: Congestion and rhinorrhea present  Mouth/Throat:      Pharynx: Uvula midline  Tonsils: 3+ on the right  3+ on the left  Eyes:      General:         Right eye: No discharge  Left eye: No discharge  Conjunctiva/sclera: Conjunctivae normal       Pupils: Pupils are equal, round, and reactive to light  Neck:      Thyroid: No thyromegaly (surgically absent)  Cardiovascular:      Rate and Rhythm: Normal rate and regular rhythm  Heart sounds: Normal heart sounds  No murmur heard  No friction rub  No gallop  Pulmonary:      Effort: Pulmonary effort is normal  No respiratory distress  Breath sounds: Normal breath sounds  No wheezing or rales  Musculoskeletal:         General: No tenderness or deformity  Normal range of motion  Cervical back: Normal range of motion  Right lower leg: No edema  Left lower leg: No edema  Lymphadenopathy:      Cervical: No cervical adenopathy  Skin:     General: Skin is warm and dry  Findings: No erythema or rash            Neurological:      Mental Status: " She is alert and oriented to person, place, and time  Cranial Nerves: No cranial nerve deficit  Coordination: Coordination normal    Psychiatric:         Attention and Perception: Attention normal          Mood and Affect: Mood is anxious  Speech: Speech normal          Behavior: Behavior normal          Thought Content:  Thought content normal          Cognition and Memory: Cognition and memory normal          Judgment: Judgment normal

## 2023-06-05 ENCOUNTER — OFFICE VISIT (OUTPATIENT)
Dept: SLEEP CENTER | Facility: CLINIC | Age: 38
End: 2023-06-05

## 2023-06-05 VITALS
SYSTOLIC BLOOD PRESSURE: 138 MMHG | HEIGHT: 66 IN | OXYGEN SATURATION: 97 % | DIASTOLIC BLOOD PRESSURE: 82 MMHG | WEIGHT: 293 LBS | RESPIRATION RATE: 20 BRPM | BODY MASS INDEX: 47.09 KG/M2 | TEMPERATURE: 98.4 F | HEART RATE: 112 BPM

## 2023-06-05 DIAGNOSIS — G47.19 EXCESSIVE DAYTIME SLEEPINESS: ICD-10-CM

## 2023-06-05 DIAGNOSIS — J45.20 MILD INTERMITTENT ASTHMA, UNSPECIFIED WHETHER COMPLICATED: ICD-10-CM

## 2023-06-05 DIAGNOSIS — Z78.9 DIFFICULTY USING CONTINUOUS POSITIVE AIRWAY PRESSURE (CPAP) DEVICE: ICD-10-CM

## 2023-06-05 DIAGNOSIS — G47.00 INSOMNIA, UNSPECIFIED TYPE: ICD-10-CM

## 2023-06-05 DIAGNOSIS — G47.33 OBSTRUCTIVE SLEEP APNEA: Primary | ICD-10-CM

## 2023-06-05 DIAGNOSIS — I10 PRIMARY HYPERTENSION: ICD-10-CM

## 2023-06-05 NOTE — PROGRESS NOTES
Consultation - [facility] [address] [place] [address],[address], 1985, MRN: [id number]    6/5/2023        Reason for Consult / Principal Problem:  Evaluation of Obstructive Sleep Apnea  Insomnia  Excessive daytime sleepiness       Thank you for the opportunity of participating in the evaluation and care of this patient in the Sleep Clinic at [facility]  Subjective:     HPI: Mitra Baptiste is a 34y o  year old female  She presents for a consultation regarding concern of obstructive sleep apnea  She reports that she was diagnosed with JEROME approximately 8 years ago  She received CPAP equipment to treat the JEROME, however, she reports that she recalls that she had difficulty using the CPAP equipment and felt that she was not able to breathe when using it  She lost her medical insurance and was required to return the equipment  Since not having CPAP equipment, she continues to snore very  loudly, gasps and her  witnesses periods of apnea  She wakes up several times per night and it can take up to an hour to fall asleep  She does not feel refreshed by sleep and reports that on some nights, she feels like she hasn't slept at all  She was considering having a tonsillectomy with adenoidectomy for large tonsils and frequent infections, prior to loss of insurance  She states that overall, her sleep symptoms worsen when she has her menstrual period      Comorbid conditions:  Obesity  HTN  Asthma  Anxiety      Review of Systems      Genitourinary hot flashes at night   Cardiology none   Gastrointestinal frequent heartburn/acid reflux and abdominal pain or cramping that disturb sleep    Neurology awaken with headache, muscle weakness, numbness/tingling of an extremity, forgetfulness, poor concentration or confusion,  and difficulty with memory   Constitutional fatigue and excessive sweating at night   Integumentary none   Psychiatry none   Musculoskeletal joint pain, muscle aches and leg cramps   Pulmonary snoring   ENT none   Endocrine none   Hematological none       Pertinent symptoms:  snoring, nocturnal choking, nocturnal gagging, nocturnal gasping, witnessed apneas, excessive daytime sleepiness, [place] 14, chronic or am headache, unrefreshing sleep and impaired concentration or memory      Sleep Study Results:  Results of past sleep study are not available at the time of the consultation  She believes testing was done in [facility]  CPAP Equipment:    She no longer has CPAP equipment    Employment:  Not currently working, previously worked day shift hours    Sleep Schedule:       Bedtime: This varies, between 9:00pm-11:00pm      Latency:  1-3 hours      Wakeup time: This varies, between 7:00-9:00am    Awakenings:       Frequency:  2-3 times per night      Causes: For urination, gasping, panic attack, dog      Duration:  10-60 minutes, longer if having a panic attack    Daytime Sleepiness / Inappropriate Sleep:       Most severe:  She never feels rested  She feels most sleepy after eating lunch        Naps :  One time per week      Time:  afternoon      Duration:  2 hours Naps are not refreshing in quality      Inappropriate drowsiness / sleep:  She may doze while watching TV and now falls asleep as a passenger in a car    Snoring:  She snores loudly with snorting, gasping    Apnea:  She is having witnessed apnea    Change in Weight:  She gained 15 lbs over the past 3 years  Restless Leg Syndrome:  She has clinical symptoms consistent with this diagnosis when getting into bed     Other Complaints:  She talks in her sleep  She got out of bed, walked, prepared food and ate in her sleep and when taking ambien  No reports of sleep paralysis or hallucinations surrounding sleep  She wakes up with headaches in the morning 1-2 times per week  She reports bruxism on an intermittent basis  No use of an oral appliance       Social History:      Caffeine:  24 ounces of "soda daily       Tobacco:   reports that she has never smoked  She has never used smokeless tobacco      E-cig/Vaping:    E-Cigarette/Vaping   • E-Cigarette Use Never User       E-Cigarette/Vaping Substances   • Nicotine No    • THC No    • CBD No    • Flavoring No    • Other No    • Unknown No          Alcohol:   reports that she does not currently use alcohol  Drugs:   reports no history of drug use  The review of systems and following portions of the patient's history were reviewed and updated as appropriate: allergies, current medications, past family history, past medical history, past social history, past surgical history and problem list         Objective:       Vitals:    06/05/23 1156   BP: 138/82   BP Location: Left arm   Patient Position: Sitting   Cuff Size: Large   Pulse: (!) 112   Resp: 20   Temp: 98 4 °F (36 9 °C)   SpO2: 97%   Weight: (!) 139 kg (306 lb)   Height: 5' 6\" (1 676 m)     Body mass index is 49 39 kg/m²  [place] Sleepiness Scale:  Total score: 14      Current Outpatient Medications:   •  albuterol (ProAir HFA) 90 mcg/act inhaler, Inhale 2 puffs every 6 (six) hours as needed for wheezing, Disp: 18 g, Rfl: 3  •  diclofenac sodium (VOLTAREN) 50 mg EC tablet, Take 1 tablet (50 mg total) by mouth 2 (two) times a day as needed (pain), Disp: 90 tablet, Rfl: 1  •  escitalopram (LEXAPRO) 20 mg tablet, Take 1 tablet (20 mg total) by mouth daily, Disp: 90 tablet, Rfl: 3  •  Flowflex COVID-19 Ag Home Test KIT, , Disp: , Rfl:   •  fluticasone (FLONASE) 50 mcg/act nasal spray, 2 sprays into each nostril daily, Disp: 48 g, Rfl: 3  •  hydrochlorothiazide (HYDRODIURIL) 25 mg tablet, Take 1 tablet (25 mg total) by mouth daily, Disp: 90 tablet, Rfl: 3  •  levocetirizine (XYZAL) 5 MG tablet, TAKE 1 TABLET EVERY EVENING, Disp: 90 tablet, Rfl: 3  •  lisinopril (ZESTRIL) 20 mg tablet, Take 1 tablet (20 mg total) by mouth daily, Disp: 90 tablet, Rfl: 3  •  LOW-DOSE ASPIRIN PO, , Disp: , Rfl:   •  " montelukast (SINGULAIR) 10 mg tablet, Take 1 tablet (10 mg total) by mouth daily at bedtime, Disp: 90 tablet, Rfl: 3  •  mupirocin (BACTROBAN) 2 % ointment, Put into both nostrils at bedtime with a Qtip x 3 months, Disp: 30 g, Rfl: 2  •  omeprazole (PriLOSEC) 20 mg delayed release capsule, Take 1 capsule (20 mg total) by mouth daily, Disp: 90 capsule, Rfl: 3  •  Synthroid 200 MCG tablet, Take 1 tablet (200 mcg total) by mouth daily, Disp: 90 tablet, Rfl: 0  •  triamcinolone (KENALOG) 0 1 % cream, Apply topically 2 (two) times a day, Disp: 30 g, Rfl: 0  •  LORazepam (ATIVAN) 1 mg tablet, Take 1 tablet (1 mg total) by mouth daily at bedtime as needed for sleep (Patient not taking: Reported on 6/5/2023), Disp: 90 tablet, Rfl: 0  •  Promethazine-DM (PHENERGAN-DM) 6 25-15 mg/5 mL oral syrup, Take 5 mL by mouth 4 (four) times a day as needed for cough (Patient not taking: Reported on 4/6/2023), Disp: 240 mL, Rfl: 1    Physical Exam  General Appearance:   Alert, cooperative, no distress, appears stated age, obese     Head:   Normocephalic, without obvious abnormality, atraumatic     Eyes:   Conjunctiva/corneas clear          Nose:  Nares normal, septum midline, mucosa normal, no drainage or sinus tenderness           Throat:  Lips, teeth and gums normal; tongue large in size, prominent and midline in position; mucosa moist with low-lying soft palatal tissue, uvula normal, tonsils +2/4 bilaterally, Mallampati class 4       Neck:  Supple, short and thick, symmetrical, trachea midline, no adenopathy; no thyromegaly noted, no carotid bruit or JVD     Lungs:      Clear to auscultation bilaterally, respirations unlabored     Heart:   Regular rate and rhythm, S1 and S2 normal, no murmur, rub or gallop       Extremities:  Extremities normal, atraumatic, no cyanosis or edema       Skin:  Skin color, texture, turgor normal, no rashes or lesions       Neurologic:  No focal deficits noted  ASSESSMENT / PLAN     1   Obstructive sleep apnea  Ambulatory Referral to Sleep Medicine    Split Study      2  Difficulty using continuous positive airway pressure (CPAP) device  Split Study      3  Excessive daytime sleepiness  Ambulatory Referral to Sleep Medicine    Split Study      4  Insomnia, unspecified type  Split Study      5  Primary hypertension  Split Study      6  Mild intermittent asthma, unspecified whether complicated  Split Study            Counseling / Coordination of Care  I have spent a total time of 55 minutes on 06/05/23 in caring for this patient including Risks and benefits of tx options, Instructions for management, Patient and family education, Importance of tx compliance, Risk factor reductions, Impressions, Counseling / Coordination of care, Documenting in the medical record, Reviewing / ordering tests, medicine, procedures   and Obtaining or reviewing history    Today we discussed the anatomy and physiology of the upper airway  I pointed out how changes in this region can result in both snoring and abnormal breathing events including apneas and hypopneas  I explained the most common co-morbidities of untreated sleep apnea  After this we talked about some forms of treatment including application of positive airway pressure, mandibular advancement devices and surgery  She would use CPAP if JEROME is confirmed  In lab titration is needed, due to past difficulty with CPAP  Due to past history of JEROME and severity of current symptoms, a split night study will be completed  Once JEROME is evaluated and confirmed, CPAP will be titrated to find the optimum pressure needed to maintain upper airway patency during sleep  Following testing, the patient will return to the Sleep [facility] for set up of equipment and compliance follow up  The following instructions have been given to the patient today:    Patient Instructions   1  Schedule split night sleep study  2   The nurse will call with results and to help schedule "set up of equipment and compliance follow up  Nursing Support:  When: Monday through Friday 7A-5PM except holidays  Where: Our direct line is [phone]  If you are having a true emergency please call 911  In the event that the line is busy or it is after hours please leave a voice message and we will return your call  Please speak clearly, leaving your full name, birth date, best number to reach you and the reason for your call  Medication refills: We will need the name of the medication, the dosage, the ordering provider, whether you get a 30 or 90 day refill, and the pharmacy name and address  Medications will be ordered by the provider only  Nurses cannot call in prescriptions  Please allow 7 days for medication refills  Physician requested updates: If your provider requested that you call with an update after starting medication, please be ready to provide us the medication and dosage, what time you take your medication, the time you attempt to fall asleep, time you fall asleep, when you wake up, and what time you get out of bed  Sleep Study Results: We will contact you with sleep study results and/or next steps after the physician has reviewed your testing  [address]      Portions of the record may have been created with voice recognition software  Occasional wrong word or \"sound a like\" substitutions may have occurred due to the inherent limitations of voice recognition software  Read the chart carefully and recognize, using context, where substitutions have occurred                     "

## 2023-06-05 NOTE — PATIENT INSTRUCTIONS
Schedule split night sleep study  The nurse will call with results and to help schedule set up of equipment and compliance follow up  Nursing Support:  When: Monday through Friday 7A-5PM except holidays  Where: Our direct line is [phone]  If you are having a true emergency please call 911  In the event that the line is busy or it is after hours please leave a voice message and we will return your call  Please speak clearly, leaving your full name, birth date, best number to reach you and the reason for your call  Medication refills: We will need the name of the medication, the dosage, the ordering provider, whether you get a 30 or 90 day refill, and the pharmacy name and address  Medications will be ordered by the provider only  Nurses cannot call in prescriptions  Please allow 7 days for medication refills  Physician requested updates: If your provider requested that you call with an update after starting medication, please be ready to provide us the medication and dosage, what time you take your medication, the time you attempt to fall asleep, time you fall asleep, when you wake up, and what time you get out of bed  Sleep Study Results: We will contact you with sleep study results and/or next steps after the physician has reviewed your testing

## 2023-06-15 DIAGNOSIS — E03.9 HYPOTHYROIDISM, UNSPECIFIED TYPE: ICD-10-CM

## 2023-06-15 RX ORDER — LEVOTHYROXINE SODIUM 200 MCG
TABLET ORAL
Qty: 90 TABLET | Refills: 0 | Status: SHIPPED | OUTPATIENT
Start: 2023-06-15

## 2023-08-22 ENCOUNTER — APPOINTMENT (OUTPATIENT)
Dept: LAB | Facility: CLINIC | Age: 38
End: 2023-08-22
Payer: COMMERCIAL

## 2023-08-22 DIAGNOSIS — E03.9 HYPOTHYROIDISM, UNSPECIFIED TYPE: ICD-10-CM

## 2023-08-22 DIAGNOSIS — F41.9 ANXIETY: ICD-10-CM

## 2023-08-22 DIAGNOSIS — D50.8 OTHER IRON DEFICIENCY ANEMIA: ICD-10-CM

## 2023-08-22 DIAGNOSIS — R73.03 PREDIABETES: ICD-10-CM

## 2023-08-22 LAB
ALBUMIN SERPL BCP-MCNC: 4 G/DL (ref 3.5–5)
ALP SERPL-CCNC: 82 U/L (ref 34–104)
ALT SERPL W P-5'-P-CCNC: 10 U/L (ref 7–52)
ANION GAP SERPL CALCULATED.3IONS-SCNC: 13 MMOL/L
AST SERPL W P-5'-P-CCNC: 13 U/L (ref 13–39)
BASOPHILS # BLD AUTO: 0.09 THOUSANDS/ÂΜL (ref 0–0.1)
BASOPHILS NFR BLD AUTO: 1 % (ref 0–1)
BILIRUB SERPL-MCNC: 0.3 MG/DL (ref 0.2–1)
BUN SERPL-MCNC: 9 MG/DL (ref 5–25)
CALCIUM SERPL-MCNC: 8.6 MG/DL (ref 8.4–10.2)
CHLORIDE SERPL-SCNC: 104 MMOL/L (ref 96–108)
CO2 SERPL-SCNC: 22 MMOL/L (ref 21–32)
CREAT SERPL-MCNC: 0.59 MG/DL (ref 0.6–1.3)
EOSINOPHIL # BLD AUTO: 0.21 THOUSAND/ÂΜL (ref 0–0.61)
EOSINOPHIL NFR BLD AUTO: 3 % (ref 0–6)
ERYTHROCYTE [DISTWIDTH] IN BLOOD BY AUTOMATED COUNT: 18.3 % (ref 11.6–15.1)
EST. AVERAGE GLUCOSE BLD GHB EST-MCNC: 148 MG/DL
FERRITIN SERPL-MCNC: 6 NG/ML (ref 11–307)
GFR SERPL CREATININE-BSD FRML MDRD: 115 ML/MIN/1.73SQ M
GLUCOSE P FAST SERPL-MCNC: 127 MG/DL (ref 65–99)
HBA1C MFR BLD: 6.8 %
HCT VFR BLD AUTO: 35.9 % (ref 34.8–46.1)
HGB BLD-MCNC: 10.2 G/DL (ref 11.5–15.4)
IMM GRANULOCYTES # BLD AUTO: 0.06 THOUSAND/UL (ref 0–0.2)
IMM GRANULOCYTES NFR BLD AUTO: 1 % (ref 0–2)
LYMPHOCYTES # BLD AUTO: 2.14 THOUSANDS/ÂΜL (ref 0.6–4.47)
LYMPHOCYTES NFR BLD AUTO: 25 % (ref 14–44)
MCH RBC QN AUTO: 21.3 PG (ref 26.8–34.3)
MCHC RBC AUTO-ENTMCNC: 28.4 G/DL (ref 31.4–37.4)
MCV RBC AUTO: 75 FL (ref 82–98)
MONOCYTES # BLD AUTO: 0.58 THOUSAND/ÂΜL (ref 0.17–1.22)
MONOCYTES NFR BLD AUTO: 7 % (ref 4–12)
NEUTROPHILS # BLD AUTO: 5.42 THOUSANDS/ÂΜL (ref 1.85–7.62)
NEUTS SEG NFR BLD AUTO: 63 % (ref 43–75)
NRBC BLD AUTO-RTO: 0 /100 WBCS
PLATELET # BLD AUTO: 383 THOUSANDS/UL (ref 149–390)
PMV BLD AUTO: 9.5 FL (ref 8.9–12.7)
POTASSIUM SERPL-SCNC: 4.4 MMOL/L (ref 3.5–5.3)
PROT SERPL-MCNC: 8.3 G/DL (ref 6.4–8.4)
RBC # BLD AUTO: 4.78 MILLION/UL (ref 3.81–5.12)
SODIUM SERPL-SCNC: 139 MMOL/L (ref 135–147)
TSH SERPL DL<=0.05 MIU/L-ACNC: 1.46 UIU/ML (ref 0.45–4.5)
WBC # BLD AUTO: 8.5 THOUSAND/UL (ref 4.31–10.16)

## 2023-08-22 PROCEDURE — 83540 ASSAY OF IRON: CPT

## 2023-08-22 PROCEDURE — 84443 ASSAY THYROID STIM HORMONE: CPT

## 2023-08-22 PROCEDURE — 85025 COMPLETE CBC W/AUTO DIFF WBC: CPT

## 2023-08-22 PROCEDURE — 82728 ASSAY OF FERRITIN: CPT

## 2023-08-22 PROCEDURE — 80053 COMPREHEN METABOLIC PANEL: CPT

## 2023-08-22 PROCEDURE — 83550 IRON BINDING TEST: CPT

## 2023-08-22 PROCEDURE — 83036 HEMOGLOBIN GLYCOSYLATED A1C: CPT

## 2023-08-22 PROCEDURE — 36415 COLL VENOUS BLD VENIPUNCTURE: CPT

## 2023-08-23 LAB
IRON SATN MFR SERPL: 5 % (ref 15–50)
IRON SERPL-MCNC: 26 UG/DL (ref 50–212)
TIBC SERPL-MCNC: 474 UG/DL (ref 250–450)
UIBC SERPL-MCNC: 448 UG/DL (ref 155–355)

## 2023-08-24 ENCOUNTER — OFFICE VISIT (OUTPATIENT)
Dept: FAMILY MEDICINE CLINIC | Facility: CLINIC | Age: 38
End: 2023-08-24
Payer: COMMERCIAL

## 2023-08-24 VITALS
WEIGHT: 293 LBS | OXYGEN SATURATION: 99 % | TEMPERATURE: 98 F | HEART RATE: 89 BPM | SYSTOLIC BLOOD PRESSURE: 122 MMHG | RESPIRATION RATE: 20 BRPM | BODY MASS INDEX: 47.09 KG/M2 | HEIGHT: 66 IN | DIASTOLIC BLOOD PRESSURE: 72 MMHG

## 2023-08-24 DIAGNOSIS — J01.01 ACUTE RECURRENT MAXILLARY SINUSITIS: ICD-10-CM

## 2023-08-24 DIAGNOSIS — E55.9 VITAMIN D DEFICIENCY: ICD-10-CM

## 2023-08-24 DIAGNOSIS — Z00.00 ANNUAL PHYSICAL EXAM: Primary | ICD-10-CM

## 2023-08-24 DIAGNOSIS — E03.9 HYPOTHYROIDISM, UNSPECIFIED TYPE: ICD-10-CM

## 2023-08-24 DIAGNOSIS — E78.5 HYPERLIPIDEMIA LDL GOAL <70: ICD-10-CM

## 2023-08-24 DIAGNOSIS — D50.9 IRON DEFICIENCY ANEMIA, UNSPECIFIED IRON DEFICIENCY ANEMIA TYPE: ICD-10-CM

## 2023-08-24 DIAGNOSIS — E11.9 TYPE 2 DIABETES MELLITUS WITHOUT COMPLICATION, WITHOUT LONG-TERM CURRENT USE OF INSULIN (HCC): ICD-10-CM

## 2023-08-24 DIAGNOSIS — Z12.4 CERVICAL CANCER SCREENING: ICD-10-CM

## 2023-08-24 PROCEDURE — 99214 OFFICE O/P EST MOD 30 MIN: CPT | Performed by: NURSE PRACTITIONER

## 2023-08-24 PROCEDURE — 99396 PREV VISIT EST AGE 40-64: CPT | Performed by: NURSE PRACTITIONER

## 2023-08-24 RX ORDER — ROSUVASTATIN CALCIUM 5 MG/1
5 TABLET, COATED ORAL DAILY
Qty: 90 TABLET | Refills: 3 | Status: SHIPPED | OUTPATIENT
Start: 2023-08-24

## 2023-08-24 RX ORDER — AZITHROMYCIN 250 MG/1
TABLET, FILM COATED ORAL
Qty: 6 TABLET | Refills: 0 | Status: SHIPPED | OUTPATIENT
Start: 2023-08-24 | End: 2023-08-28

## 2023-08-24 NOTE — PROGRESS NOTES
[facility]        NAME: Pema Mckeon is a 34 y.o. female  : 1985    MRN: [id number]  DATE: 2023  TIME: 3:35 PM    Assessment and Plan   Type 2 diabetes mellitus without complication, without long-term current use of insulin (HCC) [E11.9]  1. Type 2 diabetes mellitus without complication, without long-term current use of insulin (HCC)  metFORMIN (GLUCOPHAGE) 500 mg tablet    Hemoglobin A1C    Comprehensive metabolic panel    CBC and differential    Albumin / creatinine urine ratio      2. Vitamin D deficiency        3. Iron deficiency anemia, unspecified iron deficiency anemia type  Ambulatory Referral to Hematology / Oncology      4. Hyperlipidemia LDL goal <70  rosuvastatin (CRESTOR) 5 mg tablet    Lipid Panel with Direct LDL reflex      5. Hypothyroidism, unspecified type  TSH, 3rd generation with Free T4 reflex      6. Cervical cancer screening  Ambulatory Referral to Gynecology      7. Acute recurrent maxillary sinusitis  azithromycin (ZITHROMAX) 250 mg tablet      8. Annual physical exam              Patient Instructions     Patient Instructions   Start Metformin 500mg daily and Rosuvastatin 5mg daily  Get repeat bloodwork before next visit  Start Zithromax as directed  6 month medcheck or call sooner for problems/concerns          Chief Complaint     Chief Complaint   Patient presents with   • Follow-up         History of Present Illness       Here for 6 month medcheck-  C/o pressure and congestion in left ear and sinuses. Unsure if she has an infection  HgA1c is now 6.8, was 6.3. She is agreeable to start Metformin 500mg daily  , is willing to start low dose statin due to being Diabetic  Is taking Lisinopril 20mg daily        Review of Systems   Review of Systems   Constitutional: Positive for fatigue. Negative for activity change, diaphoresis and fever. HENT: Positive for congestion, ear pain and rhinorrhea.  Negative for facial swelling, hearing loss, sinus pressure, sinus pain, sneezing, sore throat and voice change. Eyes: Negative for discharge and visual disturbance. Respiratory: Negative for chest tightness, wheezing and stridor. Cardiovascular: Negative for chest pain, palpitations and leg swelling. Gastrointestinal: Negative for abdominal distention, abdominal pain, constipation, diarrhea, nausea and vomiting. Endocrine: Negative for polydipsia, polyphagia and polyuria. Genitourinary: Negative for difficulty urinating, dysuria, frequency and urgency. Musculoskeletal: Negative for arthralgias, back pain, gait problem, joint swelling, myalgias, neck pain and neck stiffness. Skin: Negative for color change, rash and wound. Neurological: Negative for dizziness, syncope, speech difficulty, weakness, light-headedness and headaches. Hematological: Negative for adenopathy. Does not bruise/bleed easily. Psychiatric/Behavioral: Positive for sleep disturbance. Negative for agitation, behavioral problems, confusion, hallucinations and suicidal ideas. The patient is nervous/anxious.           Current Medications       Current Outpatient Medications:   •  azithromycin (ZITHROMAX) 250 mg tablet, Take 2 tablets today then 1 tablet daily x 4 days, Disp: 6 tablet, Rfl: 0  •  metFORMIN (GLUCOPHAGE) 500 mg tablet, Take 1 tablet (500 mg total) by mouth daily with dinner, Disp: 90 tablet, Rfl: 1  •  rosuvastatin (CRESTOR) 5 mg tablet, Take 1 tablet (5 mg total) by mouth daily, Disp: 90 tablet, Rfl: 3  •  albuterol (ProAir HFA) 90 mcg/act inhaler, Inhale 2 puffs every 6 (six) hours as needed for wheezing, Disp: 18 g, Rfl: 3  •  diclofenac sodium (VOLTAREN) 50 mg EC tablet, Take 1 tablet (50 mg total) by mouth 2 (two) times a day as needed (pain), Disp: 90 tablet, Rfl: 1  •  escitalopram (LEXAPRO) 20 mg tablet, Take 1 tablet (20 mg total) by mouth daily, Disp: 90 tablet, Rfl: 3  •  Flowflex COVID-19 Ag Home Test KIT, , Disp: , Rfl:   •  fluticasone (FLONASE) 50 mcg/act nasal spray, 2 sprays into each nostril daily, Disp: 48 g, Rfl: 3  •  hydrochlorothiazide (HYDRODIURIL) 25 mg tablet, Take 1 tablet (25 mg total) by mouth daily, Disp: 90 tablet, Rfl: 3  •  levocetirizine (XYZAL) 5 MG tablet, TAKE 1 TABLET EVERY EVENING, Disp: 90 tablet, Rfl: 3  •  lisinopril (ZESTRIL) 20 mg tablet, Take 1 tablet (20 mg total) by mouth daily, Disp: 90 tablet, Rfl: 3  •  LORazepam (ATIVAN) 1 mg tablet, Take 1 tablet (1 mg total) by mouth daily at bedtime as needed for sleep (Patient not taking: Reported on 2023), Disp: 90 tablet, Rfl: 0  •  LOW-DOSE ASPIRIN PO, , Disp: , Rfl:   •  montelukast (SINGULAIR) 10 mg tablet, Take 1 tablet (10 mg total) by mouth daily at bedtime, Disp: 90 tablet, Rfl: 3  •  mupirocin (BACTROBAN) 2 % ointment, Put into both nostrils at bedtime with a Qtip x 3 months, Disp: 30 g, Rfl: 2  •  omeprazole (PriLOSEC) 20 mg delayed release capsule, Take 1 capsule (20 mg total) by mouth daily, Disp: 90 capsule, Rfl: 3  •  Synthroid 200 MCG tablet, TAKE 1 TABLET DAILY, Disp: 90 tablet, Rfl: 0  •  triamcinolone (KENALOG) 0.1 % cream, Apply topically 2 (two) times a day, Disp: 30 g, Rfl: 0    Current Allergies     Allergies as of 2023 - Reviewed 2023   Allergen Reaction Noted   • Other  08/15/2018   • Vancomycin  08/15/2018            The following portions of the patient's history were reviewed and updated as appropriate: allergies, current medications, past family history, past medical history, past social history, past surgical history and problem list.     Past Medical History:   Diagnosis Date   • Anxiety    • Asthma    • Disease of thyroid gland    • GERD (gastroesophageal reflux disease)    • Hypertension    • MRSA (methicillin resistant Staphylococcus aureus)     right leg & right leg inner thigh       Past Surgical History:   Procedure Laterality Date   • BREAST BIOPSY Right     benign   •  SECTION      x1 live birth   •  SECTION     • LEG SURGERY Right 2015    removal of MRSA   • THYROIDECTOMY  2015       Family History   Problem Relation Age of Onset   • Cervical cancer Mother    • Hypertension Mother    • Hypertension Father    • Heart disease Maternal Uncle    • Breast cancer Paternal Grandmother    • No Known Problems Sister    • No Known Problems Daughter    • No Known Problems Maternal Grandmother    • No Known Problems Maternal Grandfather    • No Known Problems Paternal Grandfather    • No Known Problems Sister    • No Known Problems Daughter    • No Known Problems Paternal Aunt    • No Known Problems Paternal Aunt    • No Known Problems Paternal Aunt    • No Known Problems Paternal Aunt    • No Known Problems Paternal Aunt    • No Known Problems Paternal Aunt    • No Known Problems Paternal Aunt          Medications have been verified. Objective   /72   Pulse 89   Temp 98 °F (36.7 °C) (Tympanic)   Resp 20   Ht 5' 6" (1.676 m)   Wt (!) 138 kg (305 lb)   SpO2 99%   BMI 49.23 kg/m²        Physical Exam     Physical Exam  Vitals and nursing note reviewed. Constitutional:       General: She is not in acute distress. Appearance: She is well-developed. She is not diaphoretic. HENT:      Right Ear: Tympanic membrane, ear canal and external ear normal.      Left Ear: Tympanic membrane, ear canal and external ear normal.      Nose: Congestion and rhinorrhea present. Cardiovascular:      Rate and Rhythm: Normal rate and regular rhythm. Heart sounds: Normal heart sounds. No murmur heard. Pulmonary:      Effort: Pulmonary effort is normal. No respiratory distress. Breath sounds: Normal breath sounds. No wheezing. Musculoskeletal:         General: No tenderness or deformity. Normal range of motion. Skin:     General: Skin is warm and dry. Findings: No erythema or rash. Neurological:      Mental Status: She is alert and oriented to person, place, and time.    Psychiatric:         Mood and Affect: Mood normal.         Behavior: Behavior normal. Behavior is cooperative. Thought Content:  Thought content normal.         Judgment: Judgment normal.

## 2023-08-24 NOTE — PROGRESS NOTES
HPI:  Fara Pulido is a 34 y.o. female here for her yearly health maintenance exam.   Patient Active Problem List   Diagnosis   • Hypertension   • GERD (gastroesophageal reflux disease)   • Asthma   • Arthritis   • Class 3 severe obesity due to excess calories with serious comorbidity and body mass index (BMI) of 40.0 to 44.9 in adult [facility])   • Chronic sinusitis   • B12 deficiency   • Iron deficiency anemia   • Vitamin D deficiency   • Hypothyroidism   • H/O total thyroidectomy   • Celiac disease   • Anxiety   • History of MRSA infection   • Prediabetes   • Allergic rhinitis with postnasal drip   • Obstructive sleep apnea   • Excessive daytime sleepiness   • Insomnia   • Difficulty using continuous positive airway pressure (CPAP) device   • Hyperlipidemia LDL goal <70     Past Medical History:   Diagnosis Date   • Anxiety    • Asthma    • Disease of thyroid gland    • GERD (gastroesophageal reflux disease)    • Hypertension    • MRSA (methicillin resistant Staphylococcus aureus)     right leg & right leg inner thigh         PHQ-2/9 Depression Screening    Little interest or pleasure in doing things: 0 - not at all  Feeling down, depressed, or hopeless: 0 - not at all  PHQ-2 Score: 0  PHQ-2 Interpretation: Negative depression screen           Current Outpatient Medications   Medication Sig Dispense Refill   • azithromycin (ZITHROMAX) 250 mg tablet Take 2 tablets today then 1 tablet daily x 4 days 6 tablet 0   • metFORMIN (GLUCOPHAGE) 500 mg tablet Take 1 tablet (500 mg total) by mouth daily with dinner 90 tablet 1   • rosuvastatin (CRESTOR) 5 mg tablet Take 1 tablet (5 mg total) by mouth daily 90 tablet 3   • albuterol (ProAir HFA) 90 mcg/act inhaler Inhale 2 puffs every 6 (six) hours as needed for wheezing 18 g 3   • diclofenac sodium (VOLTAREN) 50 mg EC tablet Take 1 tablet (50 mg total) by mouth 2 (two) times a day as needed (pain) 90 tablet 1   • escitalopram (LEXAPRO) 20 mg tablet Take 1 tablet (20 mg total) by mouth daily 90 tablet 3   • Flowflex COVID-19 Ag Home Test KIT      • fluticasone (FLONASE) 50 mcg/act nasal spray 2 sprays into each nostril daily 48 g 3   • hydrochlorothiazide (HYDRODIURIL) 25 mg tablet Take 1 tablet (25 mg total) by mouth daily 90 tablet 3   • levocetirizine (XYZAL) 5 MG tablet TAKE 1 TABLET EVERY EVENING 90 tablet 3   • lisinopril (ZESTRIL) 20 mg tablet Take 1 tablet (20 mg total) by mouth daily 90 tablet 3   • LORazepam (ATIVAN) 1 mg tablet Take 1 tablet (1 mg total) by mouth daily at bedtime as needed for sleep (Patient not taking: Reported on 6/5/2023) 90 tablet 0   • LOW-DOSE ASPIRIN PO      • montelukast (SINGULAIR) 10 mg tablet Take 1 tablet (10 mg total) by mouth daily at bedtime 90 tablet 3   • mupirocin (BACTROBAN) 2 % ointment Put into both nostrils at bedtime with a Qtip x 3 months 30 g 2   • omeprazole (PriLOSEC) 20 mg delayed release capsule Take 1 capsule (20 mg total) by mouth daily 90 capsule 3   • Synthroid 200 MCG tablet TAKE 1 TABLET DAILY 90 tablet 0   • triamcinolone (KENALOG) 0.1 % cream Apply topically 2 (two) times a day 30 g 0     No current facility-administered medications for this visit. Allergies   Allergen Reactions   • Other      seasonal   • Vancomycin      Immunization History   Administered Date(s) Administered   • Hep B, adult 02/05/2008, 03/04/2008   • INFLUENZA 09/04/2015, 10/14/2022   • Influenza, injectable, quadrivalent, preservative free 0.5 mL 11/02/2020, 10/21/2021, 10/14/2022   • Tdap 04/23/2008, 05/07/2021       Patient Care Team:  Jada Garibay as PCP - General (Family Medicine)  Igor Carson MD as PCP - [address] [place] [address] (RTE)  RACHELLE Salas (Neurology)    Review of Systems   Constitutional: Positive for fatigue. Negative for activity change, diaphoresis and fever. HENT: Positive for congestion and rhinorrhea. Negative for facial swelling, hearing loss, sinus pressure, sinus pain, sneezing, sore throat and voice change. Eyes: Negative for discharge and visual disturbance. Respiratory: Negative for cough, chest tightness, wheezing and stridor. Cardiovascular: Negative for chest pain, palpitations and leg swelling. Gastrointestinal: Negative for abdominal distention, abdominal pain, constipation, diarrhea, nausea and vomiting. Endocrine: Negative for polydipsia, polyphagia and polyuria. Genitourinary: Negative for difficulty urinating, dysuria, frequency and urgency. Musculoskeletal: Negative for arthralgias, back pain, gait problem, joint swelling, myalgias, neck pain and neck stiffness. Skin: Negative for color change, rash and wound. Neurological: Negative for dizziness, syncope, speech difficulty, weakness, light-headedness and headaches. Hematological: Negative for adenopathy. Does not bruise/bleed easily. Psychiatric/Behavioral: Positive for sleep disturbance. Negative for agitation, behavioral problems, confusion, hallucinations and suicidal ideas. The patient is nervous/anxious. Physical Exam :  Physical Exam      Reviewed Updated [facility] Prior Wellness Visits:   Last Health Maintenance visit information was reviewed, patient interviewed , no change since last HM visit yes  Last  visit information was reviewed, patient interviewed and updates made to the record today yes    Assessment and Plan:  1. Annual physical exam        2. Type 2 diabetes mellitus without complication, without long-term current use of insulin ([facility])  metFORMIN (GLUCOPHAGE) 500 mg tablet    Hemoglobin A1C    Comprehensive metabolic panel    CBC and differential    Albumin / creatinine urine ratio      3. Vitamin D deficiency        4. Iron deficiency anemia, unspecified iron deficiency anemia type  Ambulatory Referral to Hematology / Oncology      5. Hyperlipidemia LDL goal <70  rosuvastatin (CRESTOR) 5 mg tablet    Lipid Panel with Direct LDL reflex      6.  Hypothyroidism, unspecified type  TSH, 3rd generation with Free T4 reflex      7. Cervical cancer screening  Ambulatory Referral to Gynecology      8.  Acute recurrent maxillary sinusitis  azithromycin (ZITHROMAX) 250 mg tablet          Health Maintenance Due   Topic Date Due   • Hepatitis C Screening  Never done   • COVID-19 Vaccine (1) Never done   • Pneumococcal Vaccine: Pediatrics (0 to 5 Years) and At-Risk Patients (6 to 59 Years) (1 - PCV) Never done   • HIV Screening  Never done   • Cervical Cancer Screening  Never done   • Annual Physical  06/13/2023   • Influenza Vaccine (1) 09/01/2023

## 2023-08-24 NOTE — PATIENT INSTRUCTIONS
Start Metformin 500mg daily and Rosuvastatin 5mg daily  Get repeat bloodwork before next visit  Start Zithromax as directed  6 month medcheck or call sooner for problems/concerns

## 2023-08-28 ENCOUNTER — TELEPHONE (OUTPATIENT)
Dept: HEMATOLOGY ONCOLOGY | Facility: CLINIC | Age: 38
End: 2023-08-28

## 2023-08-28 NOTE — TELEPHONE ENCOUNTER
I called Mayte Guerrier in response to a referral that was received for patient to establish care with Hematology. Outreach was made to schedule a consultation. I left a voicemail explaining the reason for my call and advised patient to call [facility] at [phone]. Another attempt will be made to contact patient.

## 2023-09-06 DIAGNOSIS — E03.9 HYPOTHYROIDISM, UNSPECIFIED TYPE: ICD-10-CM

## 2023-09-06 RX ORDER — LEVOTHYROXINE SODIUM 200 MCG
TABLET ORAL
Qty: 90 TABLET | Refills: 0 | Status: SHIPPED | OUTPATIENT
Start: 2023-09-06

## 2023-09-21 ENCOUNTER — HOSPITAL ENCOUNTER (OUTPATIENT)
Dept: SLEEP CENTER | Facility: CLINIC | Age: 38
Discharge: HOME | End: 2023-09-21
Payer: COMMERCIAL

## 2023-09-21 DIAGNOSIS — G47.33 OBSTRUCTIVE SLEEP APNEA: ICD-10-CM

## 2023-09-21 DIAGNOSIS — J45.20 MILD INTERMITTENT ASTHMA, UNSPECIFIED WHETHER COMPLICATED: ICD-10-CM

## 2023-09-21 DIAGNOSIS — G47.19 EXCESSIVE DAYTIME SLEEPINESS: ICD-10-CM

## 2023-09-21 DIAGNOSIS — I10 PRIMARY HYPERTENSION: ICD-10-CM

## 2023-09-21 DIAGNOSIS — Z78.9 DIFFICULTY USING CONTINUOUS POSITIVE AIRWAY PRESSURE (CPAP) DEVICE: ICD-10-CM

## 2023-09-21 DIAGNOSIS — G47.00 INSOMNIA, UNSPECIFIED TYPE: ICD-10-CM

## 2023-09-21 PROCEDURE — 95811 POLYSOM 6/>YRS CPAP 4/> PARM: CPT | Performed by: INTERNAL MEDICINE

## 2023-09-21 PROCEDURE — 95811 POLYSOM 6/>YRS CPAP 4/> PARM: CPT

## 2023-09-22 NOTE — PROGRESS NOTES
Sleep Study Documentation    Pre-Sleep Study       Sleep testing procedure explained to patient:YES    Patient napped prior to study:NO    Caffeine:Dayshift worker after 12PM.  Caffeine use:NO    Alcohol:Dayshift workers after 5PM: Alcohol use:NO    Typical day for patient:YES       Study Documentation    Sleep Study Indications: snoring, witnessed apneas, gasping, on cpap previously    Sleep Study: Split Optimal PAP pressure: 15  Leak:Small  Snore:Eliminated  REM Obtained:yes  Supplemental O2: no    Minimum SaO2 76  Baseline SaO2 94  PAP mask tried (list all)Res Med Airfit F/F  PAP mask choice (final)SAME  PAP mask type:full face  PAP pressure at which snoring was eliminated 15  Minimum SaO2 at final PAP pressure 93  Mode of Therapy:CPAP  ETCO2:No  CPAP changed to BiPAP:No    Mode of Therapy:CPAP    EKG abnormalities: no     EEG abnormalities: no    Were abnormal behaviors in sleep observed:NO    Is Total Sleep Study Recording Time < 2 hours: N/A    Is Total Sleep Study Recording Time > 2 hours but study is incomplete: N/A    Is Total Sleep Study Recording Time 6 hours or more but sleep was not obtained: NO        Post-Sleep Study    Medication used at bedtime or during sleep study:NO    Patient reports time it took to fall asleep:20 to 30 minutes    Patient reports waking up during study:1 to 2 times. Patient reports returning to sleep without difficulty. Patient reports sleeping 2 to 4 hours without dreaming. Does the Patient feel this is a typical night of sleep:better than usual    Patient rated sleepiness: Not sleepy or tired    PAP treatment:yes: Post PAP treatment patient reports feeling better and  would wear PAP mask at home.

## 2023-09-24 DIAGNOSIS — G47.33 OBSTRUCTIVE SLEEP APNEA: Primary | ICD-10-CM

## 2023-09-24 DIAGNOSIS — G47.19 EXCESSIVE DAYTIME SLEEPINESS: ICD-10-CM

## 2023-09-26 ENCOUNTER — TELEPHONE (OUTPATIENT)
Dept: SLEEP CENTER | Facility: CLINIC | Age: 38
End: 2023-09-26

## 2023-09-26 NOTE — TELEPHONE ENCOUNTER
Split study shows very severe JEROME and APAP ordered. Patient needs DME set up.    Left call back message and a MyChart message

## 2023-09-26 NOTE — TELEPHONE ENCOUNTER
----- Message from [place], [place] sent at 9/24/2023  9:36 PM EDT -----  Very severe obstructive sleep apnea was confirmed during the split night sleep study and is likely contributing to symptoms. Recommend trial of auto-CPAP 10-15cm, due to past intolerance of CPAP. A prescription for equipment has been provided. Patient to be scheduled for set up of equipment, followed by compliance follow up 31-91 days after set up.

## 2023-10-03 LAB
DME PARACHUTE DELIVERY DATE REQUESTED: NORMAL
DME PARACHUTE DELIVERY NOTE: NORMAL
DME PARACHUTE ITEM DESCRIPTION: NORMAL
DME PARACHUTE ORDER STATUS: NORMAL
DME PARACHUTE SUPPLIER NAME: NORMAL
DME PARACHUTE SUPPLIER PHONE: NORMAL

## 2023-10-03 NOTE — TELEPHONE ENCOUNTER
DME set up 10/16/2023 in [facility]. Rx for CPAP and clinicals sent to [facility] via [place].

## 2023-10-13 LAB
DME PARACHUTE DELIVERY DATE EXPECTED: NORMAL
DME PARACHUTE DELIVERY DATE REQUESTED: NORMAL
DME PARACHUTE DELIVERY NOTE: NORMAL
DME PARACHUTE ITEM DESCRIPTION: NORMAL
DME PARACHUTE ORDER STATUS: NORMAL
DME PARACHUTE SUPPLIER NAME: NORMAL
DME PARACHUTE SUPPLIER PHONE: NORMAL

## 2023-10-16 ENCOUNTER — TELEPHONE (OUTPATIENT)
Dept: SLEEP CENTER | Facility: CLINIC | Age: 38
End: 2023-10-16

## 2023-10-16 LAB
DME PARACHUTE DELIVERY DATE ACTUAL: NORMAL
DME PARACHUTE DELIVERY DATE EXPECTED: NORMAL
DME PARACHUTE DELIVERY DATE REQUESTED: NORMAL
DME PARACHUTE DELIVERY NOTE: NORMAL
DME PARACHUTE ITEM DESCRIPTION: NORMAL
DME PARACHUTE ORDER STATUS: NORMAL
DME PARACHUTE SUPPLIER NAME: NORMAL
DME PARACHUTE SUPPLIER PHONE: NORMAL

## 2023-10-16 NOTE — TELEPHONE ENCOUNTER
Pt. was set up on 10/16/23 by Carolina Abdalla on a ResMed S11 PAP 10-15cm flex 2 and mask AirTouch F20 medium cushion.

## 2023-10-23 ENCOUNTER — IMMUNIZATIONS (OUTPATIENT)
Dept: FAMILY MEDICINE CLINIC | Facility: CLINIC | Age: 38
End: 2023-10-23
Payer: COMMERCIAL

## 2023-10-23 DIAGNOSIS — Z23 ENCOUNTER FOR IMMUNIZATION: Primary | ICD-10-CM

## 2023-10-23 PROCEDURE — 90471 IMMUNIZATION ADMIN: CPT

## 2023-10-23 PROCEDURE — 90686 IIV4 VACC NO PRSV 0.5 ML IM: CPT

## 2023-10-25 ENCOUNTER — CONSULT (OUTPATIENT)
Dept: HEMATOLOGY ONCOLOGY | Facility: CLINIC | Age: 38
End: 2023-10-25
Payer: COMMERCIAL

## 2023-10-25 ENCOUNTER — TELEPHONE (OUTPATIENT)
Dept: HEMATOLOGY ONCOLOGY | Facility: CLINIC | Age: 38
End: 2023-10-25

## 2023-10-25 VITALS
TEMPERATURE: 97.5 F | HEIGHT: 66 IN | HEART RATE: 75 BPM | RESPIRATION RATE: 17 BRPM | DIASTOLIC BLOOD PRESSURE: 72 MMHG | BODY MASS INDEX: 47.09 KG/M2 | SYSTOLIC BLOOD PRESSURE: 130 MMHG | OXYGEN SATURATION: 98 % | WEIGHT: 293 LBS

## 2023-10-25 DIAGNOSIS — D50.9 MICROCYTIC ANEMIA: Primary | ICD-10-CM

## 2023-10-25 DIAGNOSIS — D50.8 OTHER IRON DEFICIENCY ANEMIA: ICD-10-CM

## 2023-10-25 PROCEDURE — 99204 OFFICE O/P NEW MOD 45 MIN: CPT | Performed by: NURSE PRACTITIONER

## 2023-10-25 RX ORDER — SODIUM CHLORIDE 9 MG/ML
20 INJECTION, SOLUTION INTRAVENOUS ONCE
OUTPATIENT
Start: 2023-11-08

## 2023-10-25 NOTE — TELEPHONE ENCOUNTER
What would be a preferred day of the week that would work best for your infusion appointment? Any day  Do you prefer mornings or afternoons for your appointments? AM  Are there any days or dates that do not work for your schedule, including any upcoming vacations? N/a  We are going to try our best to schedule you at the infusion center closest to your home. In the event that we are unable to what would be your next preferred infusion site or sites? [facility]  Do you have transportation to take you to all of your appointments?  yes  Would you like the infusion center to draw labs from your port? (disregard if patient doesn't have a port or need labs for infusion appointment) n/a

## 2023-10-25 NOTE — TELEPHONE ENCOUNTER
Left detailed message with dates & times. Can view on [employer]. Gave my Teams # to call back to confirm.

## 2023-10-25 NOTE — PROGRESS NOTES
Hematology/Oncology Outpatient Consultation  Elizabeth Clements 34 y.o. female 1985 [id number]    Date:  10/25/2023      Assessment and Plan:  1. Microcytic anemia  Patient noted to have microcytic anemia hemoglobin 10.2 with iron deficiency ferritin of 6. Etiology most likely due to her ongoing menorrhagia. Malabsorption due to her reflux/PPI use and celiac disease is most likely contributory as well. Did recommend she discuss her menorrhagia and menstrual symptoms with her new gynecologist she is scheduled to see tomorrow. She states that she does have history of iron deficiency and has received IV iron in the past.  Last treatment was around 2015. We will correct the iron deficiency with IV iron Venofer 200 mg weekly for 6 sessions total.  Patient educated about the iron product, administration and common adverse effects including but not limited to: Anaphylaxis/allergic reaction, arthralgias/myalgias, nausea, headaches, blood pressure changes and phlebitis; agrees to proceed with treatment. The patient will be back for follow-up again in about 3 months with repeat laboratory studies prior. We will also pursue additional anemia workup to rule out other etiologies of microcytic anemia prior to follow-up. - CBC and differential; Future  - Comprehensive metabolic panel; Future  - C-reactive protein; Future  - Sedimentation rate, automated; Future  - LD,Blood; Future  - IgG, IgA, IgM; Future  - Protein electrophoresis, serum; Future  - Occult Blood, Fecal Immunochemical; Future  - Vitamin B12; Future  - Folate; Future  - Direct antiglobulin test; Future  - Iron Panel (Includes Ferritin, Iron Sat%, Iron, and TIBC); Future  - Ferritin; Future  - Iron Panel (Includes Ferritin, Iron Sat%, Iron, and TIBC); Future  - Ferritin; Future    2. Other iron deficiency anemia  - CBC and differential; Future  - Comprehensive metabolic panel; Future  - C-reactive protein; Future  - Sedimentation rate, automated;  Future  - LD,Blood; Future  - IgG, IgA, IgM; Future  - Protein electrophoresis, serum; Future  - Occult Blood, Fecal Immunochemical; Future  - Vitamin B12; Future  - Folate; Future  - Direct antiglobulin test; Future  - Iron Panel (Includes Ferritin, Iron Sat%, Iron, and TIBC); Future  - Ferritin; Future  - Iron Panel (Includes Ferritin, Iron Sat%, Iron, and TIBC); Future  - Ferritin; Future       HPI:  Patient is a pleasant 75-year-old female who presents for further evaluation/treatment of her microcytic anemia/iron deficiency. She has past medical history of diabetes, hyperlipidemia, GERD, asthma, JEROME, arthritis, hypertension, celiac disease and hypothyroidism status post thyroidectomy 2015 due to enlarged/nodular thyroid. She mentions that she does have a history of iron deficiency and was treated with IV iron in the past last treatment was around 2015. She did have upper endoscopy in the past.  Mentions that she has trialed oral iron in the past which was not effective and caused nausea/GI upset. She is on PPI for her reflux but states that she continues to have heartburn. Denies bleeding from any site other than her menstrual cycles which have been rather heavy. She states that her menses typically last 5 to 7 days the first 2 to 3 days are extremely heavy with clots where she is changing her pad/tampon every 1 to 1-1/2 hours. She is scheduled to meet with a new gynecologist tomorrow. She does report significant fatigue/exhaustion. Reports occasional headaches and dizziness/lightheadedness however they tend to occur around the time of her menses. Reports dyspnea with exertion. Admits to frequent leg cramps, restless legs at hour of sleep and pica for ice. Even mentions that she is particular about her ice and prefers the ice from [address] [place] [address]. Her most recent laboratory studies from 8/22/2023 showed normal white cells and platelets, she has microcytic hypochromic anemia H&H 10.2/35.9, MCV 75, MCH 21.3. Glucose 127 otherwise remaining metabolic panel is appropriate. TSH normal.  She is definitely iron deficient iron saturation low 5%, TIBC elevated 474, serum iron 26 with low ferritin 6. ROS: Review of Systems   Constitutional:  Positive for fatigue. +hotflashes   Respiratory:  Positive for shortness of breath. Genitourinary:  Positive for menstrual problem. Musculoskeletal:  Positive for arthralgias and myalgias. Neurological:  Positive for numbness. Psychiatric/Behavioral:  Positive for sleep disturbance. All other systems reviewed and are negative.       Past Medical History:   Diagnosis Date    Anxiety     Asthma     Disease of thyroid gland     GERD (gastroesophageal reflux disease)     Hypertension     MRSA (methicillin resistant Staphylococcus aureus)     right leg & right leg inner thigh       Past Surgical History:   Procedure Laterality Date    BREAST BIOPSY Right     benign     SECTION      x1 live birth     SECTION      LEG SURGERY Right 2015    removal of MRSA    THYROIDECTOMY  2015       Social History     Socioeconomic History    Marital status: /Civil Union     Spouse name: Not on file    Number of children: 2    Years of education: Not on file    Highest education level: Not on file   Occupational History    Occupation: employed   Tobacco Use    Smoking status: Never    Smokeless tobacco: Never   Vaping Use    Vaping Use: Never used   Substance and Sexual Activity    Alcohol use: Not Currently     Comment: socially    Drug use: No    Sexual activity: Not on file   Other Topics Concern    Not on file   Social History Narrative    Daily Caffeine Use- 2 cups of soda and coffee/hot tea on occasion     Social Determinants of Health     Financial Resource Strain: Not on file   Food Insecurity: Not on file   Transportation Needs: Not on file   Physical Activity: Not on file   Stress: Not on file   Social Connections: Not on file   Intimate Partner Violence: Not on file   Housing Stability: Not on file       Family History   Problem Relation Age of Onset    Cervical cancer Mother     Hypertension Mother     Hypertension Father     Heart disease Maternal Uncle     Breast cancer Paternal Grandmother     No Known Problems Sister     No Known Problems Daughter     No Known Problems Maternal Grandmother     No Known Problems Maternal Grandfather     No Known Problems Paternal Grandfather     No Known Problems Sister     No Known Problems Daughter     No Known Problems Paternal Aunt     No Known Problems Paternal Aunt     No Known Problems Paternal Aunt     No Known Problems Paternal Aunt     No Known Problems Paternal Aunt     No Known Problems Paternal Aunt     No Known Problems Paternal Aunt        Allergies   Allergen Reactions    Other      seasonal    Vancomycin          Current Outpatient Medications:     albuterol (ProAir HFA) 90 mcg/act inhaler, Inhale 2 puffs every 6 (six) hours as needed for wheezing, Disp: 18 g, Rfl: 3    diclofenac sodium (VOLTAREN) 50 mg EC tablet, Take 1 tablet (50 mg total) by mouth 2 (two) times a day as needed (pain), Disp: 90 tablet, Rfl: 1    escitalopram (LEXAPRO) 20 mg tablet, Take 1 tablet (20 mg total) by mouth daily, Disp: 90 tablet, Rfl: 3    Flowflex COVID-19 Ag Home Test KIT, , Disp: , Rfl:     fluticasone (FLONASE) 50 mcg/act nasal spray, 2 sprays into each nostril daily, Disp: 48 g, Rfl: 3    hydrochlorothiazide (HYDRODIURIL) 25 mg tablet, Take 1 tablet (25 mg total) by mouth daily, Disp: 90 tablet, Rfl: 3    levocetirizine (XYZAL) 5 MG tablet, TAKE 1 TABLET EVERY EVENING, Disp: 90 tablet, Rfl: 3    lisinopril (ZESTRIL) 20 mg tablet, Take 1 tablet (20 mg total) by mouth daily, Disp: 90 tablet, Rfl: 3    LOW-DOSE ASPIRIN PO, , Disp: , Rfl:     metFORMIN (GLUCOPHAGE) 500 mg tablet, Take 1 tablet (500 mg total) by mouth daily with dinner, Disp: 90 tablet, Rfl: 1    montelukast (SINGULAIR) 10 mg tablet, Take 1 tablet (10 mg total) by mouth daily at bedtime, Disp: 90 tablet, Rfl: 3    mupirocin (BACTROBAN) 2 % ointment, Put into both nostrils at bedtime with a Qtip x 3 months, Disp: 30 g, Rfl: 2    omeprazole (PriLOSEC) 20 mg delayed release capsule, Take 1 capsule (20 mg total) by mouth daily, Disp: 90 capsule, Rfl: 3    rosuvastatin (CRESTOR) 5 mg tablet, Take 1 tablet (5 mg total) by mouth daily, Disp: 90 tablet, Rfl: 3    Synthroid 200 MCG tablet, TAKE 1 TABLET DAILY, Disp: 90 tablet, Rfl: 0    triamcinolone (KENALOG) 0.1 % cream, Apply topically 2 (two) times a day, Disp: 30 g, Rfl: 0    LORazepam (ATIVAN) 1 mg tablet, Take 1 tablet (1 mg total) by mouth daily at bedtime as needed for sleep (Patient not taking: Reported on 6/5/2023), Disp: 90 tablet, Rfl: 0      Physical Exam:  /72 (BP Location: Left arm, Patient Position: Sitting, Cuff Size: Adult)   Pulse 75   Temp 97.5 °F (36.4 °C)   Resp 17   Ht 5' 6" (1.676 m)   Wt (!) 142 kg (314 lb)   SpO2 98%   BMI 50.68 kg/m²     Physical Exam  Vitals reviewed. Constitutional:       General: She is not in acute distress. Appearance: She is well-developed. She is morbidly obese. She is not diaphoretic. HENT:      Head: Normocephalic and atraumatic. Eyes:      General: No scleral icterus. Conjunctiva/sclera: Conjunctivae normal.      Pupils: Pupils are equal, round, and reactive to light. Neck:      Thyroid: No thyromegaly. Cardiovascular:      Rate and Rhythm: Normal rate and regular rhythm. Heart sounds: Normal heart sounds. No murmur heard. Pulmonary:      Effort: Pulmonary effort is normal. No respiratory distress. Breath sounds: Normal breath sounds. Abdominal:      General: There is no distension. Palpations: Abdomen is soft. There is hepatomegaly. There is no splenomegaly. Tenderness: There is no abdominal tenderness. Musculoskeletal:         General: No swelling. Normal range of motion.       Cervical back: Normal range of motion and neck supple. Lymphadenopathy:      Cervical: No cervical adenopathy. Upper Body:      Right upper body: No axillary adenopathy. Left upper body: No axillary adenopathy. Skin:     General: Skin is warm and dry. Findings: No erythema or rash. Neurological:      General: No focal deficit present. Mental Status: She is alert and oriented to person, place, and time. Psychiatric:         Mood and Affect: Mood and affect normal.         Behavior: Behavior normal. Behavior is cooperative. Thought Content: Thought content normal.         Judgment: Judgment normal.           Labs:  Lab Results   Component Value Date    WBC 8.50 08/22/2023    HGB 10.2 (L) 08/22/2023    HCT 35.9 08/22/2023    MCV 75 (L) 08/22/2023     08/22/2023        Patient voiced understanding and agreement in the above discussion. Aware to contact our office with questions/symptoms in the interim. This note has been generated by voice recognition software system. Therefore, there may be spelling, grammar, and or syntax errors. Please contact if questions arise.

## 2023-10-26 ENCOUNTER — OFFICE VISIT (OUTPATIENT)
Dept: OBGYN CLINIC | Facility: CLINIC | Age: 38
End: 2023-10-26
Payer: COMMERCIAL

## 2023-10-26 ENCOUNTER — APPOINTMENT (OUTPATIENT)
Dept: LAB | Facility: CLINIC | Age: 38
End: 2023-10-26
Payer: COMMERCIAL

## 2023-10-26 VITALS
SYSTOLIC BLOOD PRESSURE: 116 MMHG | DIASTOLIC BLOOD PRESSURE: 70 MMHG | WEIGHT: 293 LBS | HEIGHT: 66 IN | BODY MASS INDEX: 47.09 KG/M2

## 2023-10-26 DIAGNOSIS — N93.9 ABNORMAL UTERINE BLEEDING (AUB): ICD-10-CM

## 2023-10-26 DIAGNOSIS — D50.8 OTHER IRON DEFICIENCY ANEMIA: ICD-10-CM

## 2023-10-26 DIAGNOSIS — Z01.419 ENCOUNTER FOR WELL WOMAN EXAM WITH ROUTINE GYNECOLOGICAL EXAM: Primary | ICD-10-CM

## 2023-10-26 DIAGNOSIS — D50.9 MICROCYTIC ANEMIA: ICD-10-CM

## 2023-10-26 DIAGNOSIS — Z12.4 CERVICAL CANCER SCREENING: ICD-10-CM

## 2023-10-26 DIAGNOSIS — Z12.31 ENCOUNTER FOR SCREENING MAMMOGRAM FOR MALIGNANT NEOPLASM OF BREAST: ICD-10-CM

## 2023-10-26 LAB
FERRITIN SERPL-MCNC: 8 NG/ML (ref 11–307)
IRON SATN MFR SERPL: 5 % (ref 15–50)
IRON SERPL-MCNC: 25 UG/DL (ref 50–212)
TIBC SERPL-MCNC: 466 UG/DL (ref 250–450)
UIBC SERPL-MCNC: 441 UG/DL (ref 155–355)

## 2023-10-26 PROCEDURE — 83550 IRON BINDING TEST: CPT

## 2023-10-26 PROCEDURE — 83540 ASSAY OF IRON: CPT

## 2023-10-26 PROCEDURE — 99386 PREV VISIT NEW AGE 40-64: CPT | Performed by: OBSTETRICS & GYNECOLOGY

## 2023-10-26 PROCEDURE — G0476 HPV COMBO ASSAY CA SCREEN: HCPCS | Performed by: OBSTETRICS & GYNECOLOGY

## 2023-10-26 PROCEDURE — G0145 SCR C/V CYTO,THINLAYER,RESCR: HCPCS | Performed by: OBSTETRICS & GYNECOLOGY

## 2023-10-26 PROCEDURE — 82728 ASSAY OF FERRITIN: CPT

## 2023-10-26 PROCEDURE — 36415 COLL VENOUS BLD VENIPUNCTURE: CPT

## 2023-10-26 NOTE — PROGRESS NOTES
[facility] [address] [place] [address]    ASSESSMENT/PLAN: Darion Moreira is a 34 y.o. Sundeep Hopping who presents for annual gynecologic exam.    Encounter for routine gynecologic examination  - Routine well woman exam completed today. - Cervical Cancer Screening: Current ASCCP Guidelines reviewed. Last Pap: Not on file . Next Pap Due: today  - HPV Vaccination status: unsure, discussed vaccination  - STI screening offered including HIV: not done  - Contraceptive counseling discussed. Current contraception: Vasectomy   - Breast Cancer Screening: Last Mammogram 04/12/2023, mammo ordered    Additional problems addressed at this visit:  1. Encounter for well woman exam with routine gynecological exam    2. Cervical cancer screening  -     Ambulatory Referral to Gynecology  -     Liquid-based pap, screening    3. Encounter for screening mammogram for malignant neoplasm of breast  -     Mammo screening bilateral w 3d & cad; Future; Expected date: 10/26/2023    4. Abnormal uterine bleeding (AUB)  -     US pelvis complete w transvaginal; Future; Expected date: 10/26/2023          CC: Annual Gynecologic Examination    HPI: Darion Moreira is a 34 y.o. Sundeep Viramontes who presents for annual gynecologic examination. HPI  Patient presents for annual exam. States her cycles are heavy and irregular. Being treated for anemia. Discussed options, including Lysteda, contraception, IUD, ablation, or hysterectomy. Recommend EMB and ultrasound. Unsure if she has had HPV vaccination. Discussed option for vaccination  Also reports decreased libido, we discussed could be caused by SSRI or recent stresses. The following portions of the patient's history were reviewed and updated as appropriate: allergies, current medications, past family history, past medical history, obstetric history, gynecologic history, past social history, past surgical history and problem list.    Review of Systems   Constitutional: Negative. Respiratory: Negative. Cardiovascular: Negative. Gastrointestinal: Negative. Genitourinary:  Positive for menstrual problem. Musculoskeletal: Negative. All other systems reviewed and are negative. Objective:  /70   Ht 5' 6" (1.676 m)   Wt (!) 143 kg (316 lb)   LMP 10/03/2023 (Approximate)   BMI 51.00 kg/m²    Physical Exam  Vitals reviewed. Constitutional:       General: She is not in acute distress. Appearance: She is well-developed. HENT:      Head: Normocephalic and atraumatic. Nose: Nose normal.   Cardiovascular:      Rate and Rhythm: Normal rate. Pulmonary:      Effort: Pulmonary effort is normal. No respiratory distress. Chest:   Breasts:     Breasts are symmetrical.      Right: Normal. No mass, nipple discharge, skin change or tenderness. Left: Normal. No mass, nipple discharge, skin change or tenderness. Abdominal:      General: There is no distension. Palpations: Abdomen is soft. There is no mass. Tenderness: There is no abdominal tenderness. There is no guarding or rebound. Genitourinary:     General: Normal vulva. Exam position: Lithotomy position. Labia:         Right: No lesion. Left: No lesion. Urethra: No prolapse (urethral meatus normal). Vagina: Normal. No vaginal discharge, erythema or bleeding. Cervix: Normal.      Uterus: Normal.       Adnexa: Right adnexa normal and left adnexa normal.      Comments: Minimal decensus  Musculoskeletal:         General: Normal range of motion. Cervical back: Normal range of motion. Lymphadenopathy:      Upper Body:      Right upper body: No supraclavicular, axillary or pectoral adenopathy. Left upper body: No supraclavicular, axillary or pectoral adenopathy. Lower Body: No right inguinal adenopathy. No left inguinal adenopathy. Skin:     General: Skin is warm and dry.    Neurological:      Mental Status: She is alert and oriented to person, place, and time. Psychiatric:         Behavior: Behavior normal.         Thought Content:  Thought content normal.         Judgment: Judgment normal.             Aishwarya Coronado MD  [facility]  10/26/23 10:27 AM

## 2023-10-27 LAB
HPV HR 12 DNA CVX QL NAA+PROBE: NEGATIVE
HPV16 DNA CVX QL NAA+PROBE: NEGATIVE
HPV18 DNA CVX QL NAA+PROBE: NEGATIVE

## 2023-10-31 NOTE — PROGRESS NOTES
Letter of medical necessity required for split night sleep study, which was denied after completion. Letter requested by Nehemiah Le of [address]  [employer]. Letter emailed 10/31/23.

## 2023-10-31 NOTE — LETTER
October 31, 2023      To Whom it May Concern:      Kerri Miles, evaluated at at consultation in Sleep Medicine in June 2023, reported a past history of severe obstructive sleep apnea. Prior sleep study results are unavailable. She reported that she was prescribed CPAP therapy, but was unable to tolerate use of the equipment. She states that she then lost insurance and was required to return the equipment. She continues with ongoing loud snoring, witnessed gasping, witnessed periods of apnea, frequent night time awakenings leading to insomnia and excessive daytime sleepiness with an [place] of 14/24. All of these symptoms indicate severe obstructive sleep apnea. BMI is 51. She also has a history of hypertension and asthma. Due to severely elevated BMI, the severity of patient symptoms and past intolerance of CPAP equipment, it is medically necessary to complete a split night sleep study.     Thank you for your consideration,    RACHELLE Cloud

## 2023-11-01 LAB
LAB AP GYN PRIMARY INTERPRETATION: NORMAL
Lab: NORMAL
PATH INTERP SPEC-IMP: NORMAL

## 2023-11-06 DIAGNOSIS — B37.31 VAGINAL YEAST INFECTION: Primary | ICD-10-CM

## 2023-11-06 RX ORDER — FLUCONAZOLE 150 MG/1
TABLET ORAL
Qty: 2 TABLET | Refills: 0 | Status: SHIPPED | OUTPATIENT
Start: 2023-11-06 | End: 2023-11-13

## 2023-11-10 ENCOUNTER — HOSPITAL ENCOUNTER (OUTPATIENT)
Dept: ULTRASOUND IMAGING | Facility: HOSPITAL | Age: 38
End: 2023-11-10
Attending: OBSTETRICS & GYNECOLOGY
Payer: COMMERCIAL

## 2023-11-10 ENCOUNTER — HOSPITAL ENCOUNTER (OUTPATIENT)
Dept: INFUSION CENTER | Facility: HOSPITAL | Age: 38
End: 2023-11-10
Attending: INTERNAL MEDICINE
Payer: COMMERCIAL

## 2023-11-10 VITALS
OXYGEN SATURATION: 97 % | DIASTOLIC BLOOD PRESSURE: 74 MMHG | HEART RATE: 73 BPM | RESPIRATION RATE: 18 BRPM | SYSTOLIC BLOOD PRESSURE: 140 MMHG | TEMPERATURE: 97.8 F

## 2023-11-10 DIAGNOSIS — D50.8 OTHER IRON DEFICIENCY ANEMIA: Primary | ICD-10-CM

## 2023-11-10 DIAGNOSIS — N93.9 ABNORMAL UTERINE BLEEDING (AUB): ICD-10-CM

## 2023-11-10 PROCEDURE — 76830 TRANSVAGINAL US NON-OB: CPT

## 2023-11-10 PROCEDURE — 76856 US EXAM PELVIC COMPLETE: CPT

## 2023-11-10 PROCEDURE — 96365 THER/PROPH/DIAG IV INF INIT: CPT

## 2023-11-10 RX ORDER — SODIUM CHLORIDE 9 MG/ML
20 INJECTION, SOLUTION INTRAVENOUS ONCE
Status: COMPLETED | OUTPATIENT
Start: 2023-11-10 | End: 2023-11-10

## 2023-11-10 RX ORDER — SODIUM CHLORIDE 9 MG/ML
20 INJECTION, SOLUTION INTRAVENOUS ONCE
Status: CANCELLED | OUTPATIENT
Start: 2023-11-17

## 2023-11-10 RX ADMIN — SODIUM CHLORIDE 20 ML/HR: 0.9 INJECTION, SOLUTION INTRAVENOUS at 09:20

## 2023-11-10 RX ADMIN — IRON SUCROSE 200 MG: 20 INJECTION, SOLUTION INTRAVENOUS at 09:18

## 2023-11-14 ENCOUNTER — DOCUMENTATION (OUTPATIENT)
Dept: HEMATOLOGY ONCOLOGY | Facility: CLINIC | Age: 38
End: 2023-11-14

## 2023-11-14 NOTE — PROGRESS NOTES
Oncology Finance Advocacy Intake and Intervention  Oncology Finance Counselor/Advocate placed call to patient. This writer informed patient that this writer is here to assist patient with billing questions, financial assistance, payment/payment plans, quotes, copayment assistance, insurance optimization, and insurance navigation.    This writer conducted a thorough benefit review of copayment, deductible, and out of pocket cost. This information is documented below and has been reviewed with patient.     Copayment: N/A  Deductible: $550 MET $549.21 REMAINING 0.79  Out of Pocket Cost: $2050 MET $799.97 REMAINING $1250.03  Insurance optimization (Limited benefit vs self-pay): PT HS INS  Patient assistance status: NONE  Free Drug Applications: NONE  Interventions:  Pt has active yoselin meng  Called pt to go over her ins benefits got her voicemail. Left a message for pt to call me bacl  Added to careteam          Information above was review thoroughly with patient and patient was advise of possible assistance programs/interventions. If any question arise patient can contact this writer at below information. This information was given to patient at time of contact.      Tamera Rucker  Phone:[phone]  Email: [email or web address]

## 2023-11-17 ENCOUNTER — HOSPITAL ENCOUNTER (OUTPATIENT)
Dept: INFUSION CENTER | Facility: HOSPITAL | Age: 38
End: 2023-11-17
Attending: INTERNAL MEDICINE
Payer: COMMERCIAL

## 2023-11-17 VITALS
OXYGEN SATURATION: 98 % | DIASTOLIC BLOOD PRESSURE: 62 MMHG | RESPIRATION RATE: 18 BRPM | TEMPERATURE: 97.7 F | SYSTOLIC BLOOD PRESSURE: 130 MMHG | HEART RATE: 76 BPM

## 2023-11-17 DIAGNOSIS — D50.8 OTHER IRON DEFICIENCY ANEMIA: Primary | ICD-10-CM

## 2023-11-17 PROCEDURE — 96365 THER/PROPH/DIAG IV INF INIT: CPT

## 2023-11-17 RX ORDER — SODIUM CHLORIDE 9 MG/ML
20 INJECTION, SOLUTION INTRAVENOUS ONCE
Status: COMPLETED | OUTPATIENT
Start: 2023-11-17 | End: 2023-11-17

## 2023-11-17 RX ORDER — SODIUM CHLORIDE 9 MG/ML
20 INJECTION, SOLUTION INTRAVENOUS ONCE
Status: CANCELLED | OUTPATIENT
Start: 2023-11-24

## 2023-11-17 RX ADMIN — IRON SUCROSE 200 MG: 20 INJECTION, SOLUTION INTRAVENOUS at 09:10

## 2023-11-17 RX ADMIN — SODIUM CHLORIDE 20 ML/HR: 0.9 INJECTION, SOLUTION INTRAVENOUS at 09:09

## 2023-11-20 ENCOUNTER — OFFICE VISIT (OUTPATIENT)
Dept: SLEEP CENTER | Facility: CLINIC | Age: 38
End: 2023-11-20
Payer: COMMERCIAL

## 2023-11-20 VITALS
DIASTOLIC BLOOD PRESSURE: 74 MMHG | BODY MASS INDEX: 47.09 KG/M2 | HEART RATE: 76 BPM | TEMPERATURE: 98 F | HEIGHT: 66 IN | WEIGHT: 293 LBS | SYSTOLIC BLOOD PRESSURE: 136 MMHG | OXYGEN SATURATION: 98 % | RESPIRATION RATE: 20 BRPM

## 2023-11-20 DIAGNOSIS — G47.33 OBSTRUCTIVE SLEEP APNEA TREATED WITH CONTINUOUS POSITIVE AIRWAY PRESSURE (CPAP): Primary | ICD-10-CM

## 2023-11-20 PROCEDURE — 99214 OFFICE O/P EST MOD 30 MIN: CPT | Performed by: NURSE PRACTITIONER

## 2023-11-20 NOTE — PROGRESS NOTES
Progress Note - [facility] 34 y.o. female   WRM:[birth year], MRN: [id number]  11/20/2023      Follow Up Evaluation / Problem:     Very severe obstructive sleep apnea      Thank you for the opportunity of participating in the evaluation and care of this patient in the Sleep Clinic at [address] [place] [address]. HPI: Pratima Piper is a 34y.o. year old female. The patient presents for follow up of very severe obstructive sleep apnea. She was diagnosed with JEROME more than 8 years ago, but was unable to tolerated use of CPAP equipment. She continued to have loud snoring, gasping and periods of apnea noted by her . Results of past testing were not available. A split study was ordered to re-evaluate and confirm JEROME, as well as for titration, due to past intolerance of CPAP equipment. She completed a split night sleep study in September 2023. She was set up with equipment and returns to review compliance and effectiveness of treatment.       Current Outpatient Medications:     albuterol (ProAir HFA) 90 mcg/act inhaler, Inhale 2 puffs every 6 (six) hours as needed for wheezing, Disp: 18 g, Rfl: 3    diclofenac sodium (VOLTAREN) 50 mg EC tablet, Take 1 tablet (50 mg total) by mouth 2 (two) times a day as needed (pain), Disp: 90 tablet, Rfl: 1    escitalopram (LEXAPRO) 20 mg tablet, Take 1 tablet (20 mg total) by mouth daily, Disp: 90 tablet, Rfl: 3    fluticasone (FLONASE) 50 mcg/act nasal spray, 2 sprays into each nostril daily, Disp: 48 g, Rfl: 3    hydrochlorothiazide (HYDRODIURIL) 25 mg tablet, Take 1 tablet (25 mg total) by mouth daily, Disp: 90 tablet, Rfl: 3    levocetirizine (XYZAL) 5 MG tablet, TAKE 1 TABLET EVERY EVENING, Disp: 90 tablet, Rfl: 3    lisinopril (ZESTRIL) 20 mg tablet, Take 1 tablet (20 mg total) by mouth daily, Disp: 90 tablet, Rfl: 3    LOW-DOSE ASPIRIN PO, , Disp: , Rfl:     metFORMIN (GLUCOPHAGE) 500 mg tablet, Take 1 tablet (500 mg total) by mouth daily with dinner, Disp: 90 tablet, Rfl: 1    montelukast (SINGULAIR) 10 mg tablet, Take 1 tablet (10 mg total) by mouth daily at bedtime, Disp: 90 tablet, Rfl: 3    omeprazole (PriLOSEC) 20 mg delayed release capsule, Take 1 capsule (20 mg total) by mouth daily, Disp: 90 capsule, Rfl: 3    rosuvastatin (CRESTOR) 5 mg tablet, Take 1 tablet (5 mg total) by mouth daily, Disp: 90 tablet, Rfl: 3    Synthroid 200 MCG tablet, TAKE 1 TABLET DAILY, Disp: 90 tablet, Rfl: 0    How likely are you to doze off or fall asleep in the following situations, in contrast to feeling just tired? Sitting and reading: Slight chance of dozing  Watching TV: Slight chance of dozing  Sitting, inactive in a public place (e.g. a theatre or a meeting): Would never doze  As a passenger in a car for an hour without a break: Would never doze  Lying down to rest in the afternoon when circumstances permit: Moderate chance of dozing  Sitting and talking to someone: Would never doze  Sitting quietly after a lunch without alcohol: Would never doze  In a car, while stopped for a few minutes in traffic: Would never doze  Total score: 4              Vitals:    11/20/23 1007   BP: 136/74   BP Location: Left arm   Patient Position: Sitting   Cuff Size: Large   Pulse: 76   Resp: 20   Temp: 98 °F (36.7 °C)   SpO2: 98%   Weight: (!) 144 kg (316 lb 6.4 oz)   Height: 5' 6" (1.676 m)       Body mass index is 51.07 kg/m². EPWORTH SLEEPINESS SCORE  Total score: 4      Past History Since Last Sleep Center Visit:   Since her last visit, she has been getting iron infusions. Restless leg symptoms have improved. She reports that she will likely be having a hysterectomy in the future. She reports that she uses the CPAP equipment almost every night. She feels the current setting and mask are comfortable. She is sleeping for 5-8 hours and feels like she has been sleeping for much longer.   She feels refreshed when she wakes up.  Occasionally she wakes up at 3:00am and can't return to sleep. On these days, she takes an afternoon nap. She has received cushions and filters for her equipment. Results of the split night sleep study, completed on 9/21/23, are as follows:  IMPRESSION:     Very severe obstructive sleep apnea was observed in the pre-treatment phase with an apnea/hypopnea index of 111.5 events/hour. Obstructive sleep apnea caused significant sleep fragmentation, early awakenings from sleep, and moderate to severe oxygen desaturations. Obstructive sleep apnea had a significant supine component with supine AHI of 143.4 events/hour. The baseline oxygenation was normal.  During the treatment phase of the study, CPAP was titrated up to 15 cm H2O with effective elimination of   Increased periodic limb movements of sleep were observed during the treatment phase of the study with clinically insignificant arousal index  EKG showed sinus rhythm     RECOMMENDATION:  Based on this study, a trial on CPAP at 15cm H2O is recommended. If there was a history of difficulty tolerating pressures, a trial on auto titrating CPAP 10-15cm H2O can also be considered. Compliance data should be evaluated 1-3 months after CPAP initiation. Periodic limb movements were observed during the treatment phase of the study. This can be caused by the use of SSRI. If excessive daytime sleepiness persists despite adequate CPAP treatment, workup and treatment of periodic limb movements can be considered.       The review of systems and following portions of the patient's history were reviewed and updated as appropriate: allergies, current medications, past family history, past medical history, past social history, past surgical history, and problem list.        OBJECTIVE  Equipment set up date:  10/16/23  PAP Pressure: Nasal AutoPAP using a lower limit of 10 cm and an upper limit of 15 cm of water pressure  Type of mask used: full face  DME Provider: [employer]    Physical Exam:     General Appearance:   Alert, cooperative, no distress, appears stated age, obese     Lungs:    Clear to auscultation bilaterally, respirations unlabored     Heart:   Regular rate and rhythm, S1 and S2 normal, no murmur, rub or gallop       ASSESSMENT / PLAN    1. Obstructive sleep apnea treated with continuous positive airway pressure (CPAP)  PAP DME Resupply/Reorder              Counseling / Coordination of Care  I have spent a total time of 30 minutes on 11/20/23 in caring for this patient including Risks and benefits of tx options, Instructions for management, Patient and family education, Importance of tx compliance, Risk factor reductions, Impressions, Counseling / Coordination of care, Documenting in the medical record, and Reviewing / ordering tests, medicine, procedures  . Today I reviewed the patient's compliance data. she has been able to use the equipment 97% of all days recorded. Average usage was 4 or more hours 90% of all days recorded. The patient uses the equipment for an average of 5 hours and 22 minutes per night. The estimated AHI is 1.3 abnormal breathing events per hour. She is at goal for hours of use and effectiveness of treatment. The patient feels they benefit from the use of PAP equipment and would like to continue PAP therapy. Response to treatment has been excellent. A prescription for supplies has been provided to last for the next year. We discussed that she should use the CPAP equipment if she takes a nap as well. She has restless leg symptoms at times, however, she reports that symptoms have improved since having the iron infusions. She was encouraged to work on weight loss. She is hoping that energy levels will continue to improve with continued use of CPAP equipment and the iron infusions, which will help her to be more active. She will continue using this equipment at the settings noted above for the next 6 months. At that timeshe will then return for a routine follow-up evaluation. I have asked the patient to contact the Sleep [facility] if she encounters any difficulties prior to that time. The following instructions have been given to the patient today:    Patient Instructions   1. Continue use of CPAP equipment nightly  2. Continue to clean your equipment, as discussed  3. Contact the [address] with any questions or concerns prior to your next visit, as needed  4. Schedule visit for follow-up in 6 months      Nursing Support:  When: Monday through Friday 7A-5PM except holidays  Where: Our direct line is [phone]. If you are having a true emergency please call 911. In the event that the line is busy or it is after hours please leave a voice message and we will return your call. Please speak clearly, leaving your full name, birth date, best number to reach you and the reason for your call. Medication refills: We will need the name of the medication, the dosage, the ordering provider, whether you get a 30 or 90 day refill, and the pharmacy name and address. Medications will be ordered by the provider only. Nurses cannot call in prescriptions. Please allow 7 days for medication refills. Physician requested updates: If your provider requested that you call with an update after starting medication, please be ready to provide us the medication and dosage, what time you take your medication, the time you attempt to fall asleep, time you fall asleep, when you wake up, and what time you get out of bed. Sleep Study Results: We will contact you with sleep study results and/or next steps after the physician has reviewed your testing. Yudelka Sandoval, [address]      Portions of the record may have been created with voice recognition software.  Occasional wrong word or "sound a like" substitutions may have occurred due to the inherent limitations of voice recognition software. Read the chart carefully and recognize, using context, where substitutions have occurred.

## 2023-11-20 NOTE — PATIENT INSTRUCTIONS
1.  Continue use of CPAP equipment nightly  2. Continue to clean your equipment, as discussed  3. Contact the [facility] with any questions or concerns prior to your next visit, as needed  4. Schedule visit for follow-up in 6 months      Nursing Support:  When: Monday through Friday 7A-5PM except holidays  Where: Our direct line is [phone]. If you are having a true emergency please call 911. In the event that the line is busy or it is after hours please leave a voice message and we will return your call. Please speak clearly, leaving your full name, birth date, best number to reach you and the reason for your call. Medication refills: We will need the name of the medication, the dosage, the ordering provider, whether you get a 30 or 90 day refill, and the pharmacy name and address. Medications will be ordered by the provider only. Nurses cannot call in prescriptions. Please allow 7 days for medication refills. Physician requested updates: If your provider requested that you call with an update after starting medication, please be ready to provide us the medication and dosage, what time you take your medication, the time you attempt to fall asleep, time you fall asleep, when you wake up, and what time you get out of bed. Sleep Study Results: We will contact you with sleep study results and/or next steps after the physician has reviewed your testing.

## 2023-11-21 ENCOUNTER — TELEPHONE (OUTPATIENT)
Dept: SLEEP CENTER | Facility: CLINIC | Age: 38
End: 2023-11-21

## 2023-11-24 ENCOUNTER — HOSPITAL ENCOUNTER (OUTPATIENT)
Dept: INFUSION CENTER | Facility: HOSPITAL | Age: 38
End: 2023-11-24
Attending: INTERNAL MEDICINE
Payer: COMMERCIAL

## 2023-11-24 VITALS
OXYGEN SATURATION: 98 % | TEMPERATURE: 97.5 F | SYSTOLIC BLOOD PRESSURE: 140 MMHG | RESPIRATION RATE: 16 BRPM | DIASTOLIC BLOOD PRESSURE: 87 MMHG | HEART RATE: 76 BPM

## 2023-11-24 DIAGNOSIS — D50.8 OTHER IRON DEFICIENCY ANEMIA: Primary | ICD-10-CM

## 2023-11-24 PROCEDURE — 96365 THER/PROPH/DIAG IV INF INIT: CPT

## 2023-11-24 RX ORDER — SODIUM CHLORIDE 9 MG/ML
20 INJECTION, SOLUTION INTRAVENOUS ONCE
Status: CANCELLED | OUTPATIENT
Start: 2023-12-01

## 2023-11-24 RX ORDER — SODIUM CHLORIDE 9 MG/ML
20 INJECTION, SOLUTION INTRAVENOUS ONCE
Status: COMPLETED | OUTPATIENT
Start: 2023-11-24 | End: 2023-11-24

## 2023-11-24 RX ADMIN — SODIUM CHLORIDE 20 ML/HR: 0.9 INJECTION, SOLUTION INTRAVENOUS at 09:15

## 2023-11-24 RX ADMIN — IRON SUCROSE 200 MG: 20 INJECTION, SOLUTION INTRAVENOUS at 09:16

## 2023-11-24 NOTE — PROGRESS NOTES
Pt here for venofer infusion pt offered no complaints today. Vitals stable. Pt resting comfortably with call bell in place.

## 2023-11-27 LAB

## 2023-12-01 ENCOUNTER — HOSPITAL ENCOUNTER (OUTPATIENT)
Dept: INFUSION CENTER | Facility: HOSPITAL | Age: 38
End: 2023-12-01
Attending: INTERNAL MEDICINE
Payer: COMMERCIAL

## 2023-12-01 DIAGNOSIS — D50.8 OTHER IRON DEFICIENCY ANEMIA: Primary | ICD-10-CM

## 2023-12-01 PROCEDURE — 96365 THER/PROPH/DIAG IV INF INIT: CPT

## 2023-12-01 RX ORDER — SODIUM CHLORIDE 9 MG/ML
20 INJECTION, SOLUTION INTRAVENOUS ONCE
Status: COMPLETED | OUTPATIENT
Start: 2023-12-01 | End: 2023-12-01

## 2023-12-01 RX ORDER — SODIUM CHLORIDE 9 MG/ML
20 INJECTION, SOLUTION INTRAVENOUS ONCE
Status: CANCELLED | OUTPATIENT
Start: 2023-12-08

## 2023-12-01 RX ADMIN — IRON SUCROSE 200 MG: 20 INJECTION, SOLUTION INTRAVENOUS at 09:30

## 2023-12-01 RX ADMIN — SODIUM CHLORIDE 20 ML/HR: 0.9 INJECTION, SOLUTION INTRAVENOUS at 09:28

## 2023-12-01 NOTE — PROGRESS NOTES
Patient tolerated IV Venofer infusion without incident. PIV removed. AVS declined, patient stated she has MyChart. Patient ambulated off unit in stable condition.

## 2023-12-08 ENCOUNTER — HOSPITAL ENCOUNTER (OUTPATIENT)
Dept: INFUSION CENTER | Facility: HOSPITAL | Age: 38
End: 2023-12-08
Attending: INTERNAL MEDICINE
Payer: COMMERCIAL

## 2023-12-08 VITALS
RESPIRATION RATE: 18 BRPM | HEART RATE: 74 BPM | DIASTOLIC BLOOD PRESSURE: 93 MMHG | OXYGEN SATURATION: 98 % | SYSTOLIC BLOOD PRESSURE: 162 MMHG | TEMPERATURE: 97.4 F

## 2023-12-08 DIAGNOSIS — M79.671 RIGHT FOOT PAIN: Primary | ICD-10-CM

## 2023-12-08 DIAGNOSIS — D50.8 OTHER IRON DEFICIENCY ANEMIA: Primary | ICD-10-CM

## 2023-12-08 PROCEDURE — 96365 THER/PROPH/DIAG IV INF INIT: CPT

## 2023-12-08 RX ORDER — SODIUM CHLORIDE 9 MG/ML
20 INJECTION, SOLUTION INTRAVENOUS ONCE
Status: COMPLETED | OUTPATIENT
Start: 2023-12-08 | End: 2023-12-08

## 2023-12-08 RX ORDER — SODIUM CHLORIDE 9 MG/ML
20 INJECTION, SOLUTION INTRAVENOUS ONCE
OUTPATIENT
Start: 2023-12-15

## 2023-12-08 RX ADMIN — SODIUM CHLORIDE 20 ML/HR: 0.9 INJECTION, SOLUTION INTRAVENOUS at 10:18

## 2023-12-08 RX ADMIN — IRON SUCROSE 200 MG: 20 INJECTION, SOLUTION INTRAVENOUS at 10:20

## 2023-12-08 NOTE — PROGRESS NOTES
Pt tolerated venofer infusion well without incident. Discharged in stable condition and pt aware of next infusion appointment on 12/15 at 9:30AM. AVS provided.

## 2023-12-12 ENCOUNTER — TELEPHONE (OUTPATIENT)
Dept: HEMATOLOGY ONCOLOGY | Facility: CLINIC | Age: 38
End: 2023-12-12

## 2023-12-12 NOTE — TELEPHONE ENCOUNTER
Left message for patient stating we had to reschdule her appointment with Dr. Casey Ugalde to 1/23/24 at 9:20 AM.

## 2023-12-15 ENCOUNTER — HOSPITAL ENCOUNTER (OUTPATIENT)
Dept: RADIOLOGY | Facility: HOSPITAL | Age: 38
End: 2023-12-15
Payer: COMMERCIAL

## 2023-12-15 ENCOUNTER — HOSPITAL ENCOUNTER (OUTPATIENT)
Dept: INFUSION CENTER | Facility: HOSPITAL | Age: 38
Discharge: HOME | End: 2023-12-15
Attending: INTERNAL MEDICINE

## 2023-12-15 VITALS
HEART RATE: 70 BPM | RESPIRATION RATE: 18 BRPM | SYSTOLIC BLOOD PRESSURE: 142 MMHG | DIASTOLIC BLOOD PRESSURE: 85 MMHG | TEMPERATURE: 96.3 F

## 2023-12-15 DIAGNOSIS — D50.8 OTHER IRON DEFICIENCY ANEMIA: ICD-10-CM

## 2023-12-15 DIAGNOSIS — M79.671 RIGHT FOOT PAIN: ICD-10-CM

## 2023-12-15 PROCEDURE — 73630 X-RAY EXAM OF FOOT: CPT

## 2023-12-15 NOTE — PROGRESS NOTES
Unable to obtain successful IV access after multiple sticks. Patient rescheduled next Friday 12-22-23 at 0900. AVS declined Patient is aware of appointment time.

## 2023-12-18 ENCOUNTER — HOSPITAL ENCOUNTER (OUTPATIENT)
Dept: ULTRASOUND IMAGING | Facility: HOSPITAL | Age: 38
Discharge: HOME | End: 2023-12-18
Payer: COMMERCIAL

## 2023-12-18 ENCOUNTER — OFFICE VISIT (OUTPATIENT)
Dept: FAMILY MEDICINE CLINIC | Facility: CLINIC | Age: 38
End: 2023-12-18

## 2023-12-18 ENCOUNTER — APPOINTMENT (OUTPATIENT)
Dept: RADIOLOGY | Facility: CLINIC | Age: 38
End: 2023-12-18
Payer: COMMERCIAL

## 2023-12-18 VITALS
DIASTOLIC BLOOD PRESSURE: 88 MMHG | HEART RATE: 82 BPM | OXYGEN SATURATION: 98 % | RESPIRATION RATE: 18 BRPM | SYSTOLIC BLOOD PRESSURE: 138 MMHG | TEMPERATURE: 99 F | BODY MASS INDEX: 47.09 KG/M2 | WEIGHT: 293 LBS | HEIGHT: 66 IN

## 2023-12-18 DIAGNOSIS — R10.11 RUQ PAIN: ICD-10-CM

## 2023-12-18 DIAGNOSIS — M79.671 RIGHT FOOT PAIN: Primary | ICD-10-CM

## 2023-12-18 DIAGNOSIS — R07.81 RIB PAIN ON RIGHT SIDE: ICD-10-CM

## 2023-12-18 PROCEDURE — 71101 X-RAY EXAM UNILAT RIBS/CHEST: CPT

## 2023-12-18 PROCEDURE — 76705 ECHO EXAM OF ABDOMEN: CPT

## 2023-12-18 NOTE — PROGRESS NOTES
[facility]        NAME: Ines Solares is a 38 y.o. female  : 1985    MRN: [id number]  DATE: 2023  TIME: 1:54 PM    Assessment and Plan   Right foot pain [M79.671]  1. Right foot pain  Ambulatory Referral to Podiatry      2. Rib pain on right side  XR ribs right w pa chest min 3 views      3. RUQ pain  US right upper quadrant            Patient Instructions     Patient Instructions   Xray of right rib  US of right abdomen ordered  Referral to podiatry given  Will continue to monitor blood pressure, if continues high call office for medication adjustment  Call sooner for problems/concerns        Chief Complaint     Chief Complaint   Patient presents with    Chest Pain     States pain started Saturday night, took COVID and negative and does not have sore throat.     Follow-up     For foot X-Ray.    Fever     Pt states last night she started to spike fevers of 100-102.         History of Present Illness       C/o right rib/back pain on Saturday night. Was severe/on-off, improved today but not resolved. Went miniature golfing before pain started. When she pushes on the site that has pain the pain does improve. Reports feeling less pain in the last 1-2 hours. Started with a fever last night 102 temp. Denies sore throat.   Took Tylenol and lowest temp then was 100-101. Denies increased congestion, runny nose. Had pressure behind her left ear. Headaches not worse than normal.   Last dose of Tylenol at 5am.   Menstrual period started on Friday- first one since getting iron infusions. Bleeding very heavy- changing pad every hour on Saturday. Heaviness has slightly improved.   Discussed higher blood pressures at iron infusions- she has been stuck 3-5 times at every visit and has been sent home without infusion due to no IV access. She reports increased stress at these visits    C/o right foot pain- outer side of foot. Xray done. Small radiopaque soft tissue foreign body at the lateral  base of the great toe. This does not correlate to site of pain. She would like to follow up with Podiatry. She has tried to start exercising and noticed the pain is worse since, no other known injury             Review of Systems   Review of Systems   Constitutional:  Positive for fever (now resolved). Negative for activity change, diaphoresis and fatigue.   HENT:  Negative for congestion, facial swelling, hearing loss, rhinorrhea, sinus pressure, sinus pain, sneezing, sore throat and voice change.    Eyes:  Negative for discharge and visual disturbance.   Respiratory:  Negative for cough, choking, chest tightness, shortness of breath, wheezing and stridor.    Cardiovascular:  Positive for chest pain (right rib/RUQ). Negative for palpitations and leg swelling.   Gastrointestinal:  Positive for abdominal pain (RUQ). Negative for abdominal distention, constipation, diarrhea, nausea and vomiting.   Endocrine: Negative for polydipsia, polyphagia and polyuria.   Genitourinary:  Negative for difficulty urinating, dysuria, frequency and urgency.   Musculoskeletal:  Positive for arthralgias (right foot). Negative for back pain, gait problem, joint swelling, myalgias, neck pain and neck stiffness.   Skin:  Negative for color change, rash and wound.   Neurological:  Negative for dizziness, syncope, speech difficulty, weakness, light-headedness and headaches.   Hematological:  Negative for adenopathy. Does not bruise/bleed easily.   Psychiatric/Behavioral:  Negative for agitation, behavioral problems, confusion, hallucinations, sleep disturbance and suicidal ideas. The patient is not nervous/anxious.          Current Medications       Current Outpatient Medications:     albuterol (ProAir HFA) 90 mcg/act inhaler, Inhale 2 puffs every 6 (six) hours as needed for wheezing, Disp: 18 g, Rfl: 3    diclofenac sodium (VOLTAREN) 50 mg EC tablet, Take 1 tablet (50 mg total) by mouth 2 (two) times a day as needed (pain), Disp: 90 tablet,  Rfl: 1    escitalopram (LEXAPRO) 20 mg tablet, Take 1 tablet (20 mg total) by mouth daily, Disp: 90 tablet, Rfl: 3    fluticasone (FLONASE) 50 mcg/act nasal spray, 2 sprays into each nostril daily, Disp: 48 g, Rfl: 3    hydrochlorothiazide (HYDRODIURIL) 25 mg tablet, Take 1 tablet (25 mg total) by mouth daily, Disp: 90 tablet, Rfl: 3    levocetirizine (XYZAL) 5 MG tablet, TAKE 1 TABLET EVERY EVENING, Disp: 90 tablet, Rfl: 3    lisinopril (ZESTRIL) 20 mg tablet, Take 1 tablet (20 mg total) by mouth daily, Disp: 90 tablet, Rfl: 3    LOW-DOSE ASPIRIN PO, , Disp: , Rfl:     metFORMIN (GLUCOPHAGE) 500 mg tablet, Take 1 tablet (500 mg total) by mouth daily with dinner, Disp: 90 tablet, Rfl: 1    montelukast (SINGULAIR) 10 mg tablet, Take 1 tablet (10 mg total) by mouth daily at bedtime, Disp: 90 tablet, Rfl: 3    omeprazole (PriLOSEC) 20 mg delayed release capsule, Take 1 capsule (20 mg total) by mouth daily, Disp: 90 capsule, Rfl: 3    rosuvastatin (CRESTOR) 5 mg tablet, Take 1 tablet (5 mg total) by mouth daily, Disp: 90 tablet, Rfl: 3    Synthroid 200 MCG tablet, TAKE 1 TABLET DAILY, Disp: 90 tablet, Rfl: 0    Current Allergies     Allergies as of 2023 - Reviewed 2023   Allergen Reaction Noted    Other  08/15/2018    Vancomycin  08/15/2018            The following portions of the patient's history were reviewed and updated as appropriate: allergies, current medications, past family history, past medical history, past social history, past surgical history and problem list.     Past Medical History:   Diagnosis Date    Anxiety     Asthma     Disease of thyroid gland     GERD (gastroesophageal reflux disease)     Hypertension     MRSA (methicillin resistant Staphylococcus aureus)     right leg & right leg inner thigh       Past Surgical History:   Procedure Laterality Date    BREAST BIOPSY Right     benign     SECTION      x1 live birth     SECTION      LEG SURGERY Right     removal of MRSA  "   THYROIDECTOMY         Family History   Problem Relation Age of Onset    Cervical cancer Mother     Hypertension Mother     Ovarian cancer Mother     Hypertension Father     Heart disease Maternal Uncle     Breast cancer Paternal Grandmother         My dads mom  of breast cancer    No Known Problems Sister     No Known Problems Daughter     No Known Problems Maternal Grandmother     No Known Problems Maternal Grandfather     No Known Problems Paternal Grandfather     No Known Problems Sister     No Known Problems Daughter     No Known Problems Paternal Aunt     No Known Problems Paternal Aunt     No Known Problems Paternal Aunt     No Known Problems Paternal Aunt     No Known Problems Paternal Aunt     No Known Problems Paternal Aunt     No Known Problems Paternal Aunt          Medications have been verified.        Objective   /88   Pulse 82   Temp 99 °F (37.2 °C) (Tympanic)   Resp 18   Ht 5' 6\" (1.676 m)   Wt (!) 144 kg (318 lb)   SpO2 98%   BMI 51.33 kg/m²        Physical Exam     Physical Exam  Vitals and nursing note reviewed.   Constitutional:       General: She is not in acute distress.     Appearance: She is well-developed. She is not diaphoretic.   HENT:      Head: Normocephalic and atraumatic.      Right Ear: Tympanic membrane, ear canal and external ear normal.      Left Ear: Tympanic membrane, ear canal and external ear normal.      Nose: Congestion (chronic) present.      Mouth/Throat:      Mouth: Mucous membranes are moist.      Pharynx: Oropharynx is clear. Uvula midline.   Eyes:      General:         Right eye: No discharge.         Left eye: No discharge.      Conjunctiva/sclera: Conjunctivae normal.   Cardiovascular:      Rate and Rhythm: Normal rate and regular rhythm.      Heart sounds: Normal heart sounds.      No friction rub. No gallop.   Pulmonary:      Effort: Pulmonary effort is normal. No respiratory distress.      Breath sounds: Normal breath sounds. No wheezing or " rales.   Chest:          Comments: RUQ/right rib pain with tenderness/palpation.   Abdominal:      Tenderness: There is abdominal tenderness (RUQ).   Musculoskeletal:         General: No tenderness or deformity. Normal range of motion.      Cervical back: Normal range of motion.        Feet:    Feet:      Comments: Pain- worse with weight bearing, reviewed xray- pain does not correlate with FB seen by great toe  Skin:     General: Skin is warm and dry.      Findings: No erythema or rash.   Neurological:      Mental Status: She is alert and oriented to person, place, and time.   Psychiatric:         Mood and Affect: Mood normal.         Behavior: Behavior normal.         Thought Content: Thought content normal.         Judgment: Judgment normal.

## 2023-12-18 NOTE — PATIENT INSTRUCTIONS
Xray of right rib  US of right abdomen ordered  Referral to podiatry given  Will continue to monitor blood pressure, if continues high call office for medication adjustment  Call sooner for problems/concerns

## 2023-12-19 ENCOUNTER — HOSPITAL ENCOUNTER (OUTPATIENT)
Dept: CT IMAGING | Facility: HOSPITAL | Age: 38
Discharge: HOME | End: 2023-12-19
Payer: COMMERCIAL

## 2023-12-19 ENCOUNTER — APPOINTMENT (OUTPATIENT)
Dept: LAB | Facility: HOSPITAL | Age: 38
End: 2023-12-19
Payer: COMMERCIAL

## 2023-12-19 DIAGNOSIS — R10.11 RUQ PAIN: ICD-10-CM

## 2023-12-19 DIAGNOSIS — R93.89 ABNORMAL ULTRASOUND: ICD-10-CM

## 2023-12-19 DIAGNOSIS — R10.11 RUQ PAIN: Primary | ICD-10-CM

## 2023-12-19 LAB
ALBUMIN SERPL BCP-MCNC: 3.9 G/DL (ref 3.5–5)
ALP SERPL-CCNC: 85 U/L (ref 34–104)
ALT SERPL W P-5'-P-CCNC: 9 U/L (ref 7–52)
AMYLASE SERPL-CCNC: 28 IU/L (ref 29–103)
ANION GAP SERPL CALCULATED.3IONS-SCNC: 8 MMOL/L
AST SERPL W P-5'-P-CCNC: 10 U/L (ref 13–39)
BASOPHILS # BLD AUTO: 0.04 THOUSANDS/ÂΜL (ref 0–0.1)
BASOPHILS NFR BLD AUTO: 1 % (ref 0–1)
BILIRUB SERPL-MCNC: 0.26 MG/DL (ref 0.2–1)
BUN SERPL-MCNC: 12 MG/DL (ref 5–25)
CALCIUM SERPL-MCNC: 8.8 MG/DL (ref 8.4–10.2)
CHLORIDE SERPL-SCNC: 104 MMOL/L (ref 96–108)
CO2 SERPL-SCNC: 27 MMOL/L (ref 21–32)
CREAT SERPL-MCNC: 0.61 MG/DL (ref 0.6–1.3)
EOSINOPHIL # BLD AUTO: 0.17 THOUSAND/ÂΜL (ref 0–0.61)
EOSINOPHIL NFR BLD AUTO: 2 % (ref 0–6)
ERYTHROCYTE [DISTWIDTH] IN BLOOD BY AUTOMATED COUNT: 22.2 % (ref 11.6–15.1)
GFR SERPL CREATININE-BSD FRML MDRD: 113 ML/MIN/1.73SQ M
GLUCOSE P FAST SERPL-MCNC: 152 MG/DL (ref 65–99)
HCT VFR BLD AUTO: 36.1 % (ref 34.8–46.1)
HGB BLD-MCNC: 10.6 G/DL (ref 11.5–15.4)
IMM GRANULOCYTES # BLD AUTO: 0.05 THOUSAND/UL (ref 0–0.2)
IMM GRANULOCYTES NFR BLD AUTO: 1 % (ref 0–2)
LIPASE SERPL-CCNC: 21 U/L (ref 11–82)
LYMPHOCYTES # BLD AUTO: 1.81 THOUSANDS/ÂΜL (ref 0.6–4.47)
LYMPHOCYTES NFR BLD AUTO: 23 % (ref 14–44)
MCH RBC QN AUTO: 23.5 PG (ref 26.8–34.3)
MCHC RBC AUTO-ENTMCNC: 29.4 G/DL (ref 31.4–37.4)
MCV RBC AUTO: 80 FL (ref 82–98)
MONOCYTES # BLD AUTO: 0.39 THOUSAND/ÂΜL (ref 0.17–1.22)
MONOCYTES NFR BLD AUTO: 5 % (ref 4–12)
NEUTROPHILS # BLD AUTO: 5.58 THOUSANDS/ÂΜL (ref 1.85–7.62)
NEUTS SEG NFR BLD AUTO: 68 % (ref 43–75)
NRBC BLD AUTO-RTO: 0 /100 WBCS
PLATELET # BLD AUTO: 274 THOUSANDS/UL (ref 149–390)
PMV BLD AUTO: 9.1 FL (ref 8.9–12.7)
POTASSIUM SERPL-SCNC: 4.1 MMOL/L (ref 3.5–5.3)
PROT SERPL-MCNC: 7.8 G/DL (ref 6.4–8.4)
RBC # BLD AUTO: 4.51 MILLION/UL (ref 3.81–5.12)
SODIUM SERPL-SCNC: 139 MMOL/L (ref 135–147)
WBC # BLD AUTO: 8.04 THOUSAND/UL (ref 4.31–10.16)

## 2023-12-19 PROCEDURE — 83690 ASSAY OF LIPASE: CPT

## 2023-12-19 PROCEDURE — 80053 COMPREHEN METABOLIC PANEL: CPT

## 2023-12-19 PROCEDURE — 85025 COMPLETE CBC W/AUTO DIFF WBC: CPT

## 2023-12-19 PROCEDURE — G1004 CDSM NDSC: HCPCS

## 2023-12-19 PROCEDURE — 74177 CT ABD & PELVIS W/CONTRAST: CPT

## 2023-12-19 PROCEDURE — 36415 COLL VENOUS BLD VENIPUNCTURE: CPT

## 2023-12-19 PROCEDURE — 82150 ASSAY OF AMYLASE: CPT

## 2023-12-19 RX ADMIN — IOHEXOL 100 ML: 350 INJECTION, SOLUTION INTRAVENOUS at 15:32

## 2023-12-19 NOTE — PROGRESS NOTES
Scheduling called. Pt scheduled CT for this afternoon. Labs will need to be completed prior. Patient is going now. Will need them STAT. Reordered.

## 2023-12-20 ENCOUNTER — OFFICE VISIT (OUTPATIENT)
Dept: PODIATRY | Facility: CLINIC | Age: 38
End: 2023-12-20
Payer: COMMERCIAL

## 2023-12-20 VITALS
HEART RATE: 69 BPM | DIASTOLIC BLOOD PRESSURE: 93 MMHG | SYSTOLIC BLOOD PRESSURE: 145 MMHG | BODY MASS INDEX: 51 KG/M2 | WEIGHT: 293 LBS

## 2023-12-20 DIAGNOSIS — M77.31 CALCANEAL SPUR OF RIGHT FOOT: ICD-10-CM

## 2023-12-20 DIAGNOSIS — Q66.229 METATARSUS ADDUCTUS: Primary | ICD-10-CM

## 2023-12-20 DIAGNOSIS — M79.671 RIGHT FOOT PAIN: ICD-10-CM

## 2023-12-20 DIAGNOSIS — M19.079 ARTHRITIS OF MIDFOOT: ICD-10-CM

## 2023-12-20 PROCEDURE — 99204 OFFICE O/P NEW MOD 45 MIN: CPT | Performed by: PODIATRIST

## 2023-12-20 RX ORDER — SODIUM CHLORIDE 9 MG/ML
20 INJECTION, SOLUTION INTRAVENOUS ONCE
Status: CANCELLED | OUTPATIENT
Start: 2023-12-22

## 2023-12-20 NOTE — PROGRESS NOTES
"Assessment/Plan:    No problem-specific Assessment & Plan notes found for this encounter.       Diagnoses and all orders for this visit:    Metatarsus adductus    Right foot pain  -     Ambulatory Referral to Podiatry    Calcaneal spur of right foot    Arthritis of midfoot      -X-rays 3 views taken reviewed of the right foot weightbearing and do show irregularities on the lateral view across the dorsal aspect of the midfoot, no first metatarsal cellulitis but there is plantar and posterior calcaneal spurring there is also some significant metatarsus adductus on AP view there is also a \"foreign body\" at the base of her great toe, this is only visible on 1 view and is not on any locus of her pain or issues  -We will begin with simple conservative care consisting of rigid shoes and also topical anti-inflammatory such as diclofenac gel which I sent to her pharmacy, if this is not covered I advised her to purchase Voltaren gel    - I also discussed shoe gear changes no more proximal and she is to go to our [facility] running store down in [place] and they will hopefully size and dispensed good over-the-counter shoes for her.  Advised also considering purchasing of carbon fiber footplate while she is waiting for her course orthotics to be sized and dispensed for her.    Subjective:      Patient ID: Ines Solares is a 38 y.o. female.    Patient presents for evaluation and management of her right foot. She has pain on the top of the foot which feels like swelling, pressure and pain. No pain when she is she is sitting. Had taken some NSAIDs and this helps for a little bit, but doesn't last very long.  Really interferes with her activities of daily living by the end of the day.  She finds herself wishing she was off her feet finding herself doing less activity than normal she points to her dorsal midfoot.        The following portions of the patient's history were reviewed and updated as appropriate: allergies, current " medications, past family history, past medical history, past social history, past surgical history, and problem list.    Review of Systems   Constitutional:  Negative for chills and fever.   HENT:  Negative for ear pain and sore throat.    Eyes:  Negative for pain and visual disturbance.   Respiratory:  Negative for cough and shortness of breath.    Cardiovascular:  Negative for chest pain and palpitations.   Gastrointestinal:  Negative for abdominal pain and vomiting.   Genitourinary:  Negative for dysuria and hematuria.   Musculoskeletal:  Negative for arthralgias and back pain.   Skin:  Negative for color change and rash.   Neurological:  Negative for seizures and syncope.   All other systems reviewed and are negative.        Objective:      /93 (BP Location: Left arm, Patient Position: Sitting, Cuff Size: Large)   Pulse 69   Wt (!) 143 kg (316 lb)   BMI 51.00 kg/m²          Physical Exam  Musculoskeletal:      Comments: There is significant soft tissue swelling and pain with palpation over the first and second TMT J.  Pain with range of motion of the TMT J specially the lateral 4, 5, there is crepitus range of motion and there, there is some soft tissue swelling and warmth overlying the dorsal foot

## 2023-12-20 NOTE — PATIENT INSTRUCTIONS
[employer]oes: Comfortable Stylish Shoes, Sandals, Boots & More     Carbon fiber foot plate    [facility] running store    Voltaren Gel

## 2023-12-22 ENCOUNTER — HOSPITAL ENCOUNTER (OUTPATIENT)
Dept: INFUSION CENTER | Facility: HOSPITAL | Age: 38
Discharge: HOME | End: 2023-12-22
Attending: INTERNAL MEDICINE

## 2023-12-22 NOTE — PROGRESS NOTES
Pt to infusion center for venofer infusion.  Pt stuck for PIV 4x without success.  Pt prefers to reschedule and try again for next week.  Scheduled for 12/29 @ 9 am.  AVS declined.  Discharged in stable condition.

## 2023-12-26 ENCOUNTER — CLINICAL SUPPORT (OUTPATIENT)
Dept: FAMILY MEDICINE CLINIC | Facility: CLINIC | Age: 38
End: 2023-12-26
Payer: COMMERCIAL

## 2023-12-26 VITALS — DIASTOLIC BLOOD PRESSURE: 88 MMHG | SYSTOLIC BLOOD PRESSURE: 138 MMHG

## 2023-12-26 DIAGNOSIS — I10 PRIMARY HYPERTENSION: Primary | ICD-10-CM

## 2023-12-26 PROCEDURE — 99211 OFF/OP EST MAY X REQ PHY/QHP: CPT

## 2023-12-27 RX ORDER — SODIUM CHLORIDE 9 MG/ML
20 INJECTION, SOLUTION INTRAVENOUS ONCE
OUTPATIENT
Start: 2023-12-29

## 2023-12-29 DIAGNOSIS — U07.1 COVID-19: Primary | ICD-10-CM

## 2023-12-29 RX ORDER — PREDNISONE 20 MG/1
20 TABLET ORAL 2 TIMES DAILY WITH MEALS
Qty: 10 TABLET | Refills: 0 | Status: SHIPPED | OUTPATIENT
Start: 2023-12-29 | End: 2024-01-03

## 2024-01-04 ENCOUNTER — EVALUATION (OUTPATIENT)
Dept: PHYSICAL THERAPY | Facility: CLINIC | Age: 39
End: 2024-01-04
Payer: COMMERCIAL

## 2024-01-04 DIAGNOSIS — M77.31 CALCANEAL SPUR OF FOOT, RIGHT: ICD-10-CM

## 2024-01-04 DIAGNOSIS — Q66.229 METATARSUS ADDUCTUS: ICD-10-CM

## 2024-01-04 DIAGNOSIS — M79.671 RIGHT FOOT PAIN: Primary | ICD-10-CM

## 2024-01-04 DIAGNOSIS — M19.079 ARTHRITIS OF MIDFOOT: ICD-10-CM

## 2024-01-04 PROCEDURE — 97110 THERAPEUTIC EXERCISES: CPT | Performed by: PHYSICAL MEDICINE & REHABILITATION

## 2024-01-04 PROCEDURE — 97161 PT EVAL LOW COMPLEX 20 MIN: CPT | Performed by: PHYSICAL MEDICINE & REHABILITATION

## 2024-01-04 NOTE — PROGRESS NOTES
"PT Evaluation     Today's date: 2024  Patient name: Ines Solares  : 1985  MRN: [id number]  Referring provider: Inocencio Trejo*  Dx:   Encounter Diagnosis     ICD-10-CM    1. Right foot pain  M79.671       2. Metatarsus adductus  Q66.229       3. Calcaneal spur of foot, right  M77.31       4. Arthritis of midfoot  M19.079                                    SUBJECTIVE:    Per DPM note: \"-X-rays 3 views taken reviewed of the right foot weightbearing and do show irregularities on the lateral view across the dorsal aspect of the midfoot, no first metatarsal cellulitis but there is plantar and posterior calcaneal spurring there is also some significant metatarsus adductus on AP view there is also a \"foreign body\" at the base of her great toe, this is only visible on 1 view and is not on any locus of her pain or issues  -We will begin with simple conservative care consisting of rigid shoes and also topical anti-inflammatory such as diclofenac gel which I sent to her pharmacy, if this is not covered I advised her to purchase Voltaren gel     - I also discussed shoe gear changes no more proximal and she is to go to our [employer] store down in [place] and they will hopefully size and dispensed good over-the-counter shoes for her.  Advised also considering purchasing of carbon fiber footplate while she is waiting for her course orthotics to be sized and dispensed for her.     Subjective:       Patient ID: Ines Solares is a 38 y.o. female.     Patient presents for evaluation and management of her right foot. She has pain on the top of the foot which feels like swelling, pressure and pain. No pain when she is she is sitting. Had taken some NSAIDs and this helps for a little bit, but doesn't last very long.  Really interferes with her activities of daily living by the end of the day.  She finds herself wishing she was off her feet finding herself doing less activity than normal she points to her dorsal " "midfoot.\"    PT IE 1/4:  Pain started ~1.5 months ago. No known trauma/NUHA.  Thinks her 90lb day \"may have stepped on\" R foot , but not sure. No hx of such pain/sx in the past. Pain 1* R dorsal aspect of R mid foot to ankle. Notes concurrent swelling as well dorsum of R foot. No recent cuts/abrasions. No color or temp changes. No redness, fever, chills, rashes etc. Pain is worse with standing/walking. Pain is better with sitting /resting/NWB. Notes pain can linger for about 1-2 hours after sitting down. Has tried to be more active but can't bc of R foot pain and swelling. Notes her foot also hurts and is stiff when first getting up/going after sitting or laying long periods; not better with movement/activity. No other pain/sx. Pt is SAHM. Pt typically wears Crocs. Pt notes overall her sx are better with wearing more supportive shoe. Pt notes overall sx are better as of last week since changing shoes and using anti inflammatories. Does not want orthotics 2* cost. RTD upcoming/prn.     OBJECTIVE:      Foot Posture Index Score    Right Foot  Talar dome - +1  Malleolar curve - 0   Calcaneal eversion - 0  Talonavicular bulge - +1  Medial longitudinal arch - +1  Too many toes - 0  Total Score R = +3    Left Foot  Talar dome - +1  Malleolar curve - 0   Calcaneal eversion - 0  Talonavicular bulge - +1  Medial longitudinal arch - +1  Too many toes - 0  Total Score L = +3    Reference Values  Normal =    0 to +5  Pronated =  +6 to +9 Highly Pronated =  10+  Supinated =   -1 to -4 Highly Supinated = -5 to -12      Objective      Gait Assessment:  Right Stance Phase > L- Increased RF valgus and FF pronation mid-terminal stance with increased toe out and early heel rise , medial heel whip       AROM/PROM:             MMT          AROM          PROM    Ankle         R          L          R         L          R         L   PF 4- pain  WNL      DF. 4- pain  -5* stiff      DF bent knee         EHL         Inv. 4-4+  WFL      Ever. " 4-4+  WFL      Great toe Extension    25-30* stiff only , lesser toe extension AROM WNL without pain/sx         Functional Ankle Dorsiflexion ROM:  NT     Palpation:  Elevated ttp dorsal midfoot , most at proximal METS 2/3> 4 and cuneiforms, ttp R cuboid, min ttp proximal dorsal aspect 1st MET       Observation:  Very Min swelling dorsum L foot   Increased callusing under R/L 1st MET and great toe and medial great toe  Mild HAV B     Neurological Testing:  Intact light touch B; denies n/t R/L foot/LE     Joint Mobility:        Right                           Subtalar-                  WFL                                 Midfoot-                   Mild-moderate limitation, pain limited > true limitation                                     Forefoot-                 Mild limitation, limited by pain                                  Talocrural-              Hypomobile                                  First Ray-                WFL                                   Subtalar Neutral Assessment:  Right- NT          ASSESSMENT:    Ines Solares is a 38 y.o. female who was referred to physical therapy for management of ongoing R midfoot pain. Signs/sx appear consistent with repetitive stress/strain/overuse. She demonstrates hypomobility R ankle with limited R ankle DF mobility; also demonstrates poor great toe extension mobility on R; contributes to over pronation R midfoot and forefoot with increased compensatory toe out and repetitive stress/strain of midfoot and great toe.  Primary impairments include limited ankle and great toe mobility, R foot pain/swelling, limited R calf flexibility, gait abnormality, and reduced R foot/ankle strength.  Consequently, patient has difficulty completing ADLs including standing, walking, stairs, cooking/cleaning, , bathing/dressing etc.  Ines would benefit from skilled intervention to address all deficits and improve functional capability.  Patient is a good candidate for  "therapy, pending compliance with HEP and consistent participation in physical therapy.  Thank you for the referral and please do not hesitate to contact me with any questions or concerns regarding Ines’s care!      Plan:  Frequency:1-2x/week   Duration in weeks: 4-6 weeks    POC start date: 1/4/2024  POC end date: 2/15  Therapeutic exercise/activity, neuromuscular reeducation, manual therapy, and modalities.   Patient understands and agrees to plan of care.    Goals  Short Term--4 weeks  1. Patient will demonstrate 2 point decrease in pain levels.  2. Patient will demonstrate 1/2 point increase in all deficient MMT scores.  3. Pt will demonstrate increased calf flexibility with knee extended to at least 5* AROM DF to improve gait mechanics.     Long Term--By Discharge  1. Patient will achieve expected FOTO score.  2. Pt will be able to complete ADLs without pain/sx.   3. Pt will be I with HEP  4. Pt will be able to walk at least 20-30 min without increased pain/sx.     Patient's Goal: reduced R foot pain                      Precautions: HTN, asthma, GERD, anxiety       Re-eval Date:  2/15    Date 1/4/2024        Visit Count 1       FOTO 1/4/2024        Pain In See IE       Pain Out See IE           Manuals 1/4/2024        Gentle R midfoot mobs as tolerated grade 2-3, R talocrural mobs grade 3-4 as amor, R calf/heel cord stretch, R great toe extension/mobs prn as amor                                 Neuro Re-Ed        Seated arch lifts      Toe yoga        Toe splaying         Romberg      Tandem                                         Ther Ex        Calf stretch       Great toe extension/plantar fascia stretch  4x30\"        4x20\"        Self great toe extension seated  Reviewed HEP        Towel scrunches       Great toe flexion PRE         Seated-->Standing Hrs         Ankle Tband 4 way                                 Ther Activity                        Gait Training                        Modalities              "                 1/4/2024  - HEP was issued and reviewed this date for above noted exercises. Pt demonstrated understanding without incident and without questions/concerns. Will continue to update upcoming.

## 2024-01-18 ENCOUNTER — APPOINTMENT (OUTPATIENT)
Dept: LAB | Facility: CLINIC | Age: 39
End: 2024-01-18
Payer: COMMERCIAL

## 2024-01-18 DIAGNOSIS — E03.9 HYPOTHYROIDISM, UNSPECIFIED TYPE: ICD-10-CM

## 2024-01-18 DIAGNOSIS — E11.9 TYPE 2 DIABETES MELLITUS WITHOUT COMPLICATION, WITHOUT LONG-TERM CURRENT USE OF INSULIN (HCC): ICD-10-CM

## 2024-01-18 DIAGNOSIS — E78.5 HYPERLIPIDEMIA LDL GOAL <70: ICD-10-CM

## 2024-01-18 LAB
ALBUMIN SERPL BCP-MCNC: 4 G/DL (ref 3.5–5)
ALP SERPL-CCNC: 77 U/L (ref 34–104)
ALT SERPL W P-5'-P-CCNC: 11 U/L (ref 7–52)
ANION GAP SERPL CALCULATED.3IONS-SCNC: 9 MMOL/L
AST SERPL W P-5'-P-CCNC: 10 U/L (ref 13–39)
BASOPHILS # BLD AUTO: 0.05 THOUSANDS/ÂΜL (ref 0–0.1)
BASOPHILS NFR BLD AUTO: 1 % (ref 0–1)
BILIRUB SERPL-MCNC: 0.47 MG/DL (ref 0.2–1)
BUN SERPL-MCNC: 11 MG/DL (ref 5–25)
CALCIUM SERPL-MCNC: 8.9 MG/DL (ref 8.4–10.2)
CHLORIDE SERPL-SCNC: 100 MMOL/L (ref 96–108)
CHOLEST SERPL-MCNC: 147 MG/DL
CO2 SERPL-SCNC: 28 MMOL/L (ref 21–32)
CREAT SERPL-MCNC: 0.57 MG/DL (ref 0.6–1.3)
CREAT UR-MCNC: 165.2 MG/DL
CRP SERPL QL: 81.1 MG/L
DAT POLY-SP REAG RBC QL: NEGATIVE
EOSINOPHIL # BLD AUTO: 0.2 THOUSAND/ÂΜL (ref 0–0.61)
EOSINOPHIL NFR BLD AUTO: 2 % (ref 0–6)
ERYTHROCYTE [DISTWIDTH] IN BLOOD BY AUTOMATED COUNT: 21.2 % (ref 11.6–15.1)
ERYTHROCYTE [SEDIMENTATION RATE] IN BLOOD: 64 MM/HOUR (ref 0–19)
EST. AVERAGE GLUCOSE BLD GHB EST-MCNC: 163 MG/DL
FERRITIN SERPL-MCNC: 69 NG/ML (ref 11–307)
FOLATE SERPL-MCNC: 11.6 NG/ML
GFR SERPL CREATININE-BSD FRML MDRD: 116 ML/MIN/1.73SQ M
GLUCOSE P FAST SERPL-MCNC: 149 MG/DL (ref 65–99)
HBA1C MFR BLD: 7.3 %
HCT VFR BLD AUTO: 40.3 % (ref 34.8–46.1)
HDLC SERPL-MCNC: 47 MG/DL
HGB BLD-MCNC: 12.2 G/DL (ref 11.5–15.4)
IGA SERPL-MCNC: 460 MG/DL (ref 66–433)
IGG SERPL-MCNC: 1431 MG/DL (ref 635–1741)
IGM SERPL-MCNC: 229 MG/DL (ref 45–281)
IMM GRANULOCYTES # BLD AUTO: 0.05 THOUSAND/UL (ref 0–0.2)
IMM GRANULOCYTES NFR BLD AUTO: 1 % (ref 0–2)
IRON SATN MFR SERPL: 9 % (ref 15–50)
IRON SERPL-MCNC: 35 UG/DL (ref 50–212)
LDH SERPL-CCNC: 125 U/L (ref 140–271)
LDLC SERPL CALC-MCNC: 76 MG/DL (ref 0–100)
LYMPHOCYTES # BLD AUTO: 2.01 THOUSANDS/ÂΜL (ref 0.6–4.47)
LYMPHOCYTES NFR BLD AUTO: 20 % (ref 14–44)
MCH RBC QN AUTO: 24.2 PG (ref 26.8–34.3)
MCHC RBC AUTO-ENTMCNC: 30.3 G/DL (ref 31.4–37.4)
MCV RBC AUTO: 80 FL (ref 82–98)
MICROALBUMIN UR-MCNC: 21.4 MG/L
MICROALBUMIN/CREAT 24H UR: 13 MG/G CREATININE (ref 0–30)
MONOCYTES # BLD AUTO: 0.7 THOUSAND/ÂΜL (ref 0.17–1.22)
MONOCYTES NFR BLD AUTO: 7 % (ref 4–12)
NEUTROPHILS # BLD AUTO: 6.98 THOUSANDS/ÂΜL (ref 1.85–7.62)
NEUTS SEG NFR BLD AUTO: 69 % (ref 43–75)
NRBC BLD AUTO-RTO: 0 /100 WBCS
PLATELET # BLD AUTO: 288 THOUSANDS/UL (ref 149–390)
PMV BLD AUTO: 9.3 FL (ref 8.9–12.7)
POTASSIUM SERPL-SCNC: 4.1 MMOL/L (ref 3.5–5.3)
PROT SERPL-MCNC: 7.3 G/DL (ref 6.4–8.4)
RBC # BLD AUTO: 5.04 MILLION/UL (ref 3.81–5.12)
SODIUM SERPL-SCNC: 137 MMOL/L (ref 135–147)
TIBC SERPL-MCNC: 389 UG/DL (ref 250–450)
TRIGL SERPL-MCNC: 119 MG/DL
TSH SERPL DL<=0.05 MIU/L-ACNC: 0.59 UIU/ML (ref 0.45–4.5)
UIBC SERPL-MCNC: 354 UG/DL (ref 155–355)
VIT B12 SERPL-MCNC: 189 PG/ML (ref 180–914)
WBC # BLD AUTO: 9.99 THOUSAND/UL (ref 4.31–10.16)

## 2024-01-18 PROCEDURE — 83550 IRON BINDING TEST: CPT

## 2024-01-18 PROCEDURE — 80061 LIPID PANEL: CPT

## 2024-01-18 PROCEDURE — 86140 C-REACTIVE PROTEIN: CPT

## 2024-01-18 PROCEDURE — 85652 RBC SED RATE AUTOMATED: CPT

## 2024-01-18 PROCEDURE — 86880 COOMBS TEST DIRECT: CPT

## 2024-01-18 PROCEDURE — 82746 ASSAY OF FOLIC ACID SERUM: CPT

## 2024-01-18 PROCEDURE — 84165 PROTEIN E-PHORESIS SERUM: CPT

## 2024-01-18 PROCEDURE — 82784 ASSAY IGA/IGD/IGG/IGM EACH: CPT

## 2024-01-18 PROCEDURE — 82043 UR ALBUMIN QUANTITATIVE: CPT

## 2024-01-18 PROCEDURE — 82607 VITAMIN B-12: CPT

## 2024-01-18 PROCEDURE — 83540 ASSAY OF IRON: CPT

## 2024-01-18 PROCEDURE — 84443 ASSAY THYROID STIM HORMONE: CPT

## 2024-01-18 PROCEDURE — 82728 ASSAY OF FERRITIN: CPT

## 2024-01-18 PROCEDURE — 82570 ASSAY OF URINE CREATININE: CPT

## 2024-01-18 PROCEDURE — 85025 COMPLETE CBC W/AUTO DIFF WBC: CPT

## 2024-01-18 PROCEDURE — 80053 COMPREHEN METABOLIC PANEL: CPT

## 2024-01-18 PROCEDURE — 83615 LACTATE (LD) (LDH) ENZYME: CPT

## 2024-01-18 PROCEDURE — 83036 HEMOGLOBIN GLYCOSYLATED A1C: CPT

## 2024-01-22 LAB
ALBUMIN SERPL ELPH-MCNC: 3.6 G/DL (ref 3.2–5.1)
ALBUMIN SERPL ELPH-MCNC: 50 % (ref 48–70)
ALPHA1 GLOB SERPL ELPH-MCNC: 0.34 G/DL (ref 0.15–0.47)
ALPHA1 GLOB SERPL ELPH-MCNC: 4.7 % (ref 1.8–7)
ALPHA2 GLOB SERPL ELPH-MCNC: 0.73 G/DL (ref 0.42–1.04)
ALPHA2 GLOB SERPL ELPH-MCNC: 10.2 % (ref 5.9–14.9)
BETA GLOB ABNORMAL SERPL ELPH-MCNC: 0.55 G/DL (ref 0.31–0.57)
BETA1 GLOB SERPL ELPH-MCNC: 7.7 % (ref 4.7–7.7)
BETA2 GLOB SERPL ELPH-MCNC: 9.2 % (ref 3.1–7.9)
BETA2+GAMMA GLOB SERPL ELPH-MCNC: 0.66 G/DL (ref 0.2–0.58)
GAMMA GLOB ABNORMAL SERPL ELPH-MCNC: 1.31 G/DL (ref 0.4–1.66)
GAMMA GLOB SERPL ELPH-MCNC: 18.2 % (ref 6.9–22.3)
IGG/ALB SER: 1 {RATIO} (ref 1.1–1.8)
PROT PATTERN SERPL ELPH-IMP: ABNORMAL
PROT SERPL-MCNC: 7.2 G/DL (ref 6.4–8.2)

## 2024-01-22 PROCEDURE — 84165 PROTEIN E-PHORESIS SERUM: CPT | Performed by: PATHOLOGY

## 2024-01-23 ENCOUNTER — OFFICE VISIT (OUTPATIENT)
Dept: HEMATOLOGY ONCOLOGY | Facility: CLINIC | Age: 39
End: 2024-01-23
Payer: COMMERCIAL

## 2024-01-23 ENCOUNTER — TELEPHONE (OUTPATIENT)
Dept: HEMATOLOGY ONCOLOGY | Facility: CLINIC | Age: 39
End: 2024-01-23

## 2024-01-23 VITALS
BODY MASS INDEX: 47.09 KG/M2 | WEIGHT: 293 LBS | RESPIRATION RATE: 18 BRPM | TEMPERATURE: 98.2 F | HEART RATE: 76 BPM | SYSTOLIC BLOOD PRESSURE: 132 MMHG | OXYGEN SATURATION: 99 % | DIASTOLIC BLOOD PRESSURE: 80 MMHG | HEIGHT: 66 IN

## 2024-01-23 DIAGNOSIS — D50.9 MICROCYTIC ANEMIA: ICD-10-CM

## 2024-01-23 DIAGNOSIS — E53.8 B12 DEFICIENCY: Primary | ICD-10-CM

## 2024-01-23 DIAGNOSIS — D50.8 OTHER IRON DEFICIENCY ANEMIA: ICD-10-CM

## 2024-01-23 PROCEDURE — 99214 OFFICE O/P EST MOD 30 MIN: CPT | Performed by: INTERNAL MEDICINE

## 2024-01-23 RX ORDER — CYANOCOBALAMIN 1000 UG/ML
1000 INJECTION, SOLUTION INTRAMUSCULAR; SUBCUTANEOUS ONCE
OUTPATIENT
Start: 2024-02-06 | End: 2024-01-30

## 2024-01-23 RX ORDER — SODIUM CHLORIDE 9 MG/ML
20 INJECTION, SOLUTION INTRAVENOUS ONCE
OUTPATIENT
Start: 2024-02-06

## 2024-01-23 NOTE — PROGRESS NOTES
Hematology/Oncology Outpatient Follow-up  Ines Solares 38 y.o. female 1985 [id number]    Date:  1/23/2024    Assessment and Plan:  1. B12 deficiency  The patient was educated about her low vitamin B12 level.  She was told that we will give her 3 doses of vitamin B12 IM.  I did also ask her to take vitamin B12 supplements on a daily basis.  - CBC and differential; Future  - Comprehensive metabolic panel; Future  - Ferritin; Future  - C-reactive protein; Future  - Iron Panel (Includes Ferritin, Iron Sat%, Iron, and TIBC); Future  - Sedimentation rate, automated; Future  - Vitamin B12; Future    2. Other iron deficiency anemia  The patient does not seem to be anemic at this point.  Her iron level continues to be borderline low.  I did offer her 3 more doses of Venofer IV to correct her iron deficiency related anemia or low normal hemoglobin level.  She will be seen by the GYN team to discuss hysterectomy in the near future since she does have heavy menstrual bleeding.  - CBC and differential; Future  - Comprehensive metabolic panel; Future  - Ferritin; Future  - C-reactive protein; Future  - Iron Panel (Includes Ferritin, Iron Sat%, Iron, and TIBC); Future  - Sedimentation rate, automated; Future  - Vitamin B12; Future    3. Microcytic anemia  As above.      HPI:  Patient is a pleasant 38-year-old female who presents for further evaluation/treatment of her microcytic anemia/iron deficiency.  She has past medical history of diabetes, hyperlipidemia, GERD, asthma, JEROME, arthritis, hypertension, celiac disease and hypothyroidism status post thyroidectomy 2015 due to enlarged/nodular thyroid.  She mentions that she does have a history of iron deficiency and was treated with IV iron in the past last treatment was around 2015.  She did have upper endoscopy in the past.  Mentions that she has trialed oral iron in the past which was not effective and caused nausea/GI upset.     She is on PPI for her reflux but states that  she continues to have heartburn.  Denies bleeding from any site other than her menstrual cycles which have been rather heavy.  She states that her menses typically last 5 to 7 days the first 2 to 3 days are extremely heavy with clots where she is changing her pad/tampon every 1 to 1-1/2 hours.  She is scheduled to meet with a new gynecologist tomorrow.     She does report significant fatigue/exhaustion.  Reports occasional headaches and dizziness/lightheadedness however they tend to occur around the time of her menses.  Reports dyspnea with exertion.  Admits to frequent leg cramps, restless legs at hour of sleep and pica for ice.  Even mentions that she is particular about her ice and prefers the ice from [employer].          Interval history:  The patient came today for follow-up visit.  She was treated with 5 sessions out of 6 of Venofer to correct her iron deficiency related anemia.    She had an ultrasound of the abdomen which showed hepatomegaly and hepatic steatosis.  A CT scan of the abdomen pelvis was also done on 12/19/2023 which showed:  MPRESSION:     No acute intra-abdominal pathology.  No peripancreatic inflammatory changes to suggest acute pancreatitis. Mildly decreased areas of attenuation in the pancreatic head and mid to distal pancreatic body are likely on the basis of focal fatty infiltration.  Hepatomegaly/hepatic steatosis.    Blood work from 1/18/2024 showed hemoglobin of 12.2 up from 10.6.  MCV was 80.  White cells and platelets were within normal range.  Creatinine 0.5 with normal calcium and liver enzymes.  Inflammatory markers are elevated.  Immunoglobulins showed slightly elevated IgA with normal SPEP.  Vitamin B12 189.  Folate was normal.  Hemolysis markers were within normal range.  The iron panel showed ferritin of 69 up from 8 with iron saturation of 9%.  She stated she has heavy menstrual bleeding.  She is in the process of getting hysterectomy.          ROS: Review of Systems    Constitutional:  Positive for fatigue. Negative for chills and fever.   HENT:  Positive for trouble swallowing. Negative for ear pain and sore throat.    Eyes:  Negative for pain and visual disturbance.   Respiratory:  Positive for shortness of breath. Negative for cough.    Cardiovascular:  Negative for chest pain and palpitations.   Gastrointestinal:  Positive for constipation and nausea. Negative for abdominal pain and vomiting.   Genitourinary:  Negative for dysuria and hematuria.   Musculoskeletal:  Negative for arthralgias and back pain.   Skin:  Negative for color change and rash.   Neurological:  Positive for dizziness. Negative for seizures and syncope.   Psychiatric/Behavioral:  Positive for sleep disturbance.    All other systems reviewed and are negative.      Past Medical History:   Diagnosis Date    Anxiety     Asthma     Disease of thyroid gland     GERD (gastroesophageal reflux disease)     Hypertension     MRSA (methicillin resistant Staphylococcus aureus)     right leg & right leg inner thigh       Past Surgical History:   Procedure Laterality Date    BREAST BIOPSY Right     benign     SECTION      x1 live birth     SECTION      LEG SURGERY Right 2015    removal of MRSA    THYROIDECTOMY  2015       Social History     Socioeconomic History    Marital status: /Civil Union     Spouse name: None    Number of children: 2    Years of education: None    Highest education level: None   Occupational History    Occupation: employed   Tobacco Use    Smoking status: Never    Smokeless tobacco: Never   Vaping Use    Vaping status: Never Used   Substance and Sexual Activity    Alcohol use: Not Currently     Comment: socially    Drug use: Never    Sexual activity: Yes     Partners: Male     Birth control/protection: None   Other Topics Concern    None   Social History Narrative    Daily Caffeine Use- 2 cups of soda and coffee/hot tea on occasion     Social Determinants of Health      Financial Resource Strain: Not on file   Food Insecurity: Not on file   Transportation Needs: Not on file   Physical Activity: Not on file   Stress: Not on file   Social Connections: Not on file   Intimate Partner Violence: Not on file   Housing Stability: Not on file       Family History   Problem Relation Age of Onset    Cervical cancer Mother     Hypertension Mother     Ovarian cancer Mother     Hypertension Father     Heart disease Maternal Uncle     Breast cancer Paternal Grandmother         My dads mom  of breast cancer    No Known Problems Sister     No Known Problems Daughter     No Known Problems Maternal Grandmother     No Known Problems Maternal Grandfather     No Known Problems Paternal Grandfather     No Known Problems Sister     No Known Problems Daughter     No Known Problems Paternal Aunt     No Known Problems Paternal Aunt     No Known Problems Paternal Aunt     No Known Problems Paternal Aunt     No Known Problems Paternal Aunt     No Known Problems Paternal Aunt     No Known Problems Paternal Aunt        Allergies   Allergen Reactions    Other      seasonal    Vancomycin          Current Outpatient Medications:     albuterol (ProAir HFA) 90 mcg/act inhaler, Inhale 2 puffs every 6 (six) hours as needed for wheezing, Disp: 18 g, Rfl: 3    Diclofenac Sodium (VOLTAREN) 1 %, Apply 2 g topically 4 (four) times a day, Disp: 60 g, Rfl: 3    diclofenac sodium (VOLTAREN) 50 mg EC tablet, Take 1 tablet (50 mg total) by mouth 2 (two) times a day as needed (pain), Disp: 90 tablet, Rfl: 1    escitalopram (LEXAPRO) 20 mg tablet, Take 1 tablet (20 mg total) by mouth daily, Disp: 90 tablet, Rfl: 3    fluticasone (FLONASE) 50 mcg/act nasal spray, 2 sprays into each nostril daily, Disp: 48 g, Rfl: 3    hydrochlorothiazide (HYDRODIURIL) 25 mg tablet, Take 1 tablet (25 mg total) by mouth daily, Disp: 90 tablet, Rfl: 3    levocetirizine (XYZAL) 5 MG tablet, TAKE 1 TABLET EVERY EVENING, Disp: 90 tablet, Rfl: 3    " lisinopril (ZESTRIL) 20 mg tablet, Take 1 tablet (20 mg total) by mouth daily, Disp: 90 tablet, Rfl: 3    LOW-DOSE ASPIRIN PO, , Disp: , Rfl:     metFORMIN (GLUCOPHAGE) 500 mg tablet, Take 1 tablet (500 mg total) by mouth daily with dinner, Disp: 90 tablet, Rfl: 1    montelukast (SINGULAIR) 10 mg tablet, Take 1 tablet (10 mg total) by mouth daily at bedtime, Disp: 90 tablet, Rfl: 3    omeprazole (PriLOSEC) 20 mg delayed release capsule, Take 1 capsule (20 mg total) by mouth daily, Disp: 90 capsule, Rfl: 3    rosuvastatin (CRESTOR) 5 mg tablet, Take 1 tablet (5 mg total) by mouth daily, Disp: 90 tablet, Rfl: 3    Synthroid 200 MCG tablet, TAKE 1 TABLET DAILY, Disp: 90 tablet, Rfl: 0      Physical Exam:  /80 (BP Location: Right arm, Patient Position: Sitting, Cuff Size: Adult)   Pulse 76   Temp 98.2 °F (36.8 °C)   Resp 18   Ht 5' 6\" (1.676 m)   Wt (!) 143 kg (316 lb)   SpO2 99%   BMI 51.00 kg/m²     Physical Exam  Constitutional:       General: She is not in acute distress.     Appearance: She is well-developed. She is obese. She is not diaphoretic.   HENT:      Head: Normocephalic and atraumatic.      Nose: Nose normal.   Eyes:      General: No scleral icterus.        Right eye: No discharge.         Left eye: No discharge.      Conjunctiva/sclera: Conjunctivae normal.      Pupils: Pupils are equal, round, and reactive to light.   Neck:      Thyroid: No thyromegaly.      Vascular: No JVD.      Trachea: No tracheal deviation.   Cardiovascular:      Rate and Rhythm: Normal rate and regular rhythm.      Heart sounds: Normal heart sounds. No murmur heard.     No friction rub.   Pulmonary:      Effort: Pulmonary effort is normal. No respiratory distress.      Breath sounds: Normal breath sounds. No stridor. No wheezing or rales.   Chest:      Chest wall: No tenderness.   Abdominal:      General: There is no distension.      Palpations: Abdomen is soft. There is no hepatomegaly or splenomegaly.      " Tenderness: There is no abdominal tenderness. There is no guarding or rebound.      Comments: Morbidly obese abdomen   Musculoskeletal:         General: No tenderness or deformity. Normal range of motion.      Cervical back: Normal range of motion and neck supple.   Lymphadenopathy:      Cervical: No cervical adenopathy.   Skin:     General: Skin is warm and dry.      Coloration: Skin is not pale.      Findings: No erythema or rash.   Neurological:      Mental Status: She is alert and oriented to person, place, and time.      Cranial Nerves: No cranial nerve deficit.      Coordination: Coordination normal.      Deep Tendon Reflexes: Reflexes are normal and symmetric.   Psychiatric:         Behavior: Behavior normal.         Thought Content: Thought content normal.         Judgment: Judgment normal.           Labs:  Lab Results   Component Value Date    WBC 9.99 01/18/2024    HGB 12.2 01/18/2024    HCT 40.3 01/18/2024    MCV 80 (L) 01/18/2024     01/18/2024     Lab Results   Component Value Date     04/23/2018    K 4.1 01/18/2024     01/18/2024    CO2 28 01/18/2024    ANIONGAP 12.1 04/23/2018    BUN 11 01/18/2024    CREATININE 0.57 (L) 01/18/2024    GLUF 149 (H) 01/18/2024    CALCIUM 8.9 01/18/2024    CORRECTEDCA 9.0 04/03/2023    AST 10 (L) 01/18/2024    ALT 11 01/18/2024    ALKPHOS 77 01/18/2024    PROT 8.2 04/23/2018    BILITOT 0.5 04/23/2018    EGFR 116 01/18/2024       Patient voiced understanding and agreement in the above discussion. Aware to contact our office with questions/symptoms in the interim.

## 2024-01-28 DIAGNOSIS — E03.9 HYPOTHYROIDISM, UNSPECIFIED TYPE: ICD-10-CM

## 2024-01-29 RX ORDER — LEVOTHYROXINE SODIUM 200 MCG
TABLET ORAL
Qty: 90 TABLET | Refills: 3 | Status: SHIPPED | OUTPATIENT
Start: 2024-01-29

## 2024-01-29 NOTE — TELEPHONE ENCOUNTER
Patient requesting refill(s) of: synthroid 200 mcg daily     Last filled: 9/6/2023 #90 x 0  Last appt: 12/18/2023  Next appt: 2/13/2024  Pharmacy: [facility]

## 2024-02-06 ENCOUNTER — HOSPITAL ENCOUNTER (OUTPATIENT)
Dept: INFUSION CENTER | Facility: HOSPITAL | Age: 39
Discharge: HOME | End: 2024-02-06
Attending: INTERNAL MEDICINE
Payer: COMMERCIAL

## 2024-02-06 VITALS
TEMPERATURE: 98.2 F | SYSTOLIC BLOOD PRESSURE: 132 MMHG | RESPIRATION RATE: 16 BRPM | HEART RATE: 77 BPM | OXYGEN SATURATION: 97 % | DIASTOLIC BLOOD PRESSURE: 82 MMHG

## 2024-02-06 DIAGNOSIS — D50.8 OTHER IRON DEFICIENCY ANEMIA: Primary | ICD-10-CM

## 2024-02-06 DIAGNOSIS — E53.8 B12 DEFICIENCY: ICD-10-CM

## 2024-02-06 PROCEDURE — 96372 THER/PROPH/DIAG INJ SC/IM: CPT

## 2024-02-06 PROCEDURE — 96365 THER/PROPH/DIAG IV INF INIT: CPT

## 2024-02-06 RX ORDER — CYANOCOBALAMIN 1000 UG/ML
1000 INJECTION, SOLUTION INTRAMUSCULAR; SUBCUTANEOUS ONCE
OUTPATIENT
Start: 2024-02-13 | End: 2024-02-13

## 2024-02-06 RX ORDER — UBIDECARENONE 75 MG
1000 CAPSULE ORAL DAILY
COMMUNITY

## 2024-02-06 RX ORDER — SODIUM CHLORIDE 9 MG/ML
20 INJECTION, SOLUTION INTRAVENOUS ONCE
Status: COMPLETED | OUTPATIENT
Start: 2024-02-06 | End: 2024-02-06

## 2024-02-06 RX ORDER — SODIUM CHLORIDE 9 MG/ML
20 INJECTION, SOLUTION INTRAVENOUS ONCE
OUTPATIENT
Start: 2024-02-13

## 2024-02-06 RX ORDER — CYANOCOBALAMIN 1000 UG/ML
1000 INJECTION, SOLUTION INTRAMUSCULAR; SUBCUTANEOUS ONCE
Status: COMPLETED | OUTPATIENT
Start: 2024-02-06 | End: 2024-02-06

## 2024-02-06 RX ADMIN — SODIUM CHLORIDE 20 ML/HR: 0.9 INJECTION, SOLUTION INTRAVENOUS at 08:23

## 2024-02-06 RX ADMIN — IRON SUCROSE 200 MG: 20 INJECTION, SOLUTION INTRAVENOUS at 08:28

## 2024-02-06 RX ADMIN — CYANOCOBALAMIN 1000 MCG: 1000 INJECTION, SOLUTION INTRAMUSCULAR at 08:25

## 2024-02-06 NOTE — PROGRESS NOTES
Ines Solares  tolerated Venofer and B12 treatment well with no complications.      Ines Solares is aware of future appt on 2/13 at 12PM.     AVS printed and given to Ines Solares:No (Declined by Ines Solares).

## 2024-02-12 ENCOUNTER — OFFICE VISIT (OUTPATIENT)
Dept: FAMILY MEDICINE CLINIC | Facility: CLINIC | Age: 39
End: 2024-02-12
Payer: COMMERCIAL

## 2024-02-12 VITALS
WEIGHT: 293 LBS | OXYGEN SATURATION: 98 % | BODY MASS INDEX: 47.09 KG/M2 | DIASTOLIC BLOOD PRESSURE: 76 MMHG | RESPIRATION RATE: 18 BRPM | HEART RATE: 79 BPM | TEMPERATURE: 98 F | HEIGHT: 66 IN | SYSTOLIC BLOOD PRESSURE: 128 MMHG

## 2024-02-12 DIAGNOSIS — E11.9 ENCOUNTER FOR DIABETIC FOOT EXAM (HCC): ICD-10-CM

## 2024-02-12 DIAGNOSIS — E03.9 HYPOTHYROIDISM, UNSPECIFIED TYPE: ICD-10-CM

## 2024-02-12 DIAGNOSIS — E11.9 TYPE 2 DIABETES MELLITUS WITHOUT COMPLICATION, WITHOUT LONG-TERM CURRENT USE OF INSULIN (HCC): Primary | ICD-10-CM

## 2024-02-12 DIAGNOSIS — E78.5 HYPERLIPIDEMIA LDL GOAL <70: ICD-10-CM

## 2024-02-12 DIAGNOSIS — E55.9 VITAMIN D DEFICIENCY: ICD-10-CM

## 2024-02-12 LAB
LEFT EYE DIABETIC RETINOPATHY: NORMAL
LEFT EYE IMAGE QUALITY: NORMAL
LEFT EYE MACULAR EDEMA: NORMAL
LEFT EYE OTHER RETINOPATHY: NORMAL
RIGHT EYE DIABETIC RETINOPATHY: NORMAL
RIGHT EYE IMAGE QUALITY: NORMAL
RIGHT EYE MACULAR EDEMA: NORMAL
RIGHT EYE OTHER RETINOPATHY: NORMAL
SEVERITY (EYE EXAM): NORMAL

## 2024-02-12 PROCEDURE — 99214 OFFICE O/P EST MOD 30 MIN: CPT | Performed by: NURSE PRACTITIONER

## 2024-02-12 NOTE — PATIENT INSTRUCTIONS
Increase Metformin to twice daily  Continue same medications  Soak right toes in Epsom salt 2x/day for 3-4 days, if symptoms worsen, call for antibiotic as discussed  Return in 6 months or call sooner for problems/concerns

## 2024-02-12 NOTE — PROGRESS NOTES
Name: Ines Solares      : 1985      MRN: [id number]  Encounter Provider: RACHELLE Ospina  Encounter Date: 2024   Encounter department: [facility] PRIMARY [facility]ARE    Assessment & Plan     1. Type 2 diabetes mellitus without complication, without long-term current use of insulin (HCC)  -     metFORMIN (GLUCOPHAGE) 500 mg tablet; Take 1 tablet (500 mg total) by mouth 2 (two) times a day with meals  -     Comprehensive metabolic panel; Future; Expected date: 2024  -     CBC and differential; Future; Expected date: 2024  -     Hemoglobin A1C; Future; Expected date: 2024  -     IRIS Diabetic eye exam    2. Hypothyroidism, unspecified type  -     TSH, 3rd generation with Free T4 reflex; Future; Expected date: 2024    3. Hyperlipidemia LDL goal <70  -     Lipid Panel with Direct LDL reflex; Future; Expected date: 2024    4. Encounter for diabetic foot exam (HCC)    5. Vitamin D deficiency        Depression Screening and Follow-up Plan: Patient was screened for depression during today's encounter. They screened negative with a PHQ-2 score of 0.        Subjective      Here for 6 month medcheck-  Is following with Hematology- is scheduled for 3 more iron infusions and B12 injections, is also taking B12 oral. Will be getting levels checked in 6 months    Erythema of her third toe on the right foot. Patient reports swelling and redness. Began using epsom salt soaks with significant improvement. Instructed to continue twice daily foot soaks. Patient agreeable to this. Will call if symptoms worsen.          Review of Systems   Constitutional:  Negative for activity change, diaphoresis, fatigue and fever.   HENT:  Negative for congestion, facial swelling, hearing loss, rhinorrhea, sinus pressure, sinus pain, sneezing, sore throat and voice change.    Eyes:  Negative for discharge and visual disturbance.   Respiratory:  Negative for cough, choking, chest tightness, shortness of  breath, wheezing and stridor.    Cardiovascular:  Negative for chest pain, palpitations and leg swelling.   Gastrointestinal:  Negative for abdominal distention, abdominal pain, constipation, diarrhea, nausea and vomiting.   Endocrine: Negative for polydipsia, polyphagia and polyuria.   Genitourinary:  Negative for difficulty urinating, dysuria, frequency and urgency.   Musculoskeletal:  Negative for arthralgias, back pain, gait problem, joint swelling, myalgias, neck pain and neck stiffness.   Skin:  Negative for rash and wound.        Erythematous area on third toe of right foot    Neurological:  Negative for dizziness, syncope, speech difficulty, weakness, light-headedness and headaches.   Hematological:  Negative for adenopathy. Does not bruise/bleed easily.   Psychiatric/Behavioral:  Negative for agitation, behavioral problems, confusion, hallucinations, sleep disturbance and suicidal ideas. The patient is not nervous/anxious.    All other systems reviewed and are negative.      Current Outpatient Medications on File Prior to Visit   Medication Sig    albuterol (ProAir HFA) 90 mcg/act inhaler Inhale 2 puffs every 6 (six) hours as needed for wheezing    cyanocobalamin (VITAMIN B-12) 100 mcg tablet Take 1,000 mcg by mouth daily    Diclofenac Sodium (VOLTAREN) 1 % Apply 2 g topically 4 (four) times a day    diclofenac sodium (VOLTAREN) 50 mg EC tablet Take 1 tablet (50 mg total) by mouth 2 (two) times a day as needed (pain)    escitalopram (LEXAPRO) 20 mg tablet Take 1 tablet (20 mg total) by mouth daily    fluticasone (FLONASE) 50 mcg/act nasal spray 2 sprays into each nostril daily    hydrochlorothiazide (HYDRODIURIL) 25 mg tablet Take 1 tablet (25 mg total) by mouth daily    levocetirizine (XYZAL) 5 MG tablet TAKE 1 TABLET EVERY EVENING    lisinopril (ZESTRIL) 20 mg tablet Take 1 tablet (20 mg total) by mouth daily    LOW-DOSE ASPIRIN PO     montelukast (SINGULAIR) 10 mg tablet Take 1 tablet (10 mg total) by  "mouth daily at bedtime    omeprazole (PriLOSEC) 20 mg delayed release capsule Take 1 capsule (20 mg total) by mouth daily    rosuvastatin (CRESTOR) 5 mg tablet Take 1 tablet (5 mg total) by mouth daily    Synthroid 200 MCG tablet TAKE 1 TABLET DAILY    [DISCONTINUED] metFORMIN (GLUCOPHAGE) 500 mg tablet Take 1 tablet (500 mg total) by mouth daily with dinner       Objective     /76   Pulse 79   Temp 98 °F (36.7 °C) (Tympanic)   Resp 18   Ht 5' 6\" (1.676 m)   Wt (!) 142 kg (314 lb)   SpO2 98%   BMI 50.68 kg/m²     Physical Exam  Vitals and nursing note reviewed.   Constitutional:       General: She is not in acute distress.     Appearance: She is well-developed. She is obese. She is not diaphoretic.   Neck:      Thyroid: No thyromegaly.      Trachea: No tracheal deviation.   Cardiovascular:      Rate and Rhythm: Normal rate and regular rhythm.      Pulses: no weak pulses          Dorsalis pedis pulses are 2+ on the right side and 2+ on the left side.      Heart sounds: Normal heart sounds. No murmur heard.  Pulmonary:      Effort: Pulmonary effort is normal. No respiratory distress.      Breath sounds: Normal breath sounds. No wheezing.   Musculoskeletal:         General: No tenderness or deformity. Normal range of motion.      Cervical back: Normal range of motion and neck supple.        Feet:    Feet:      Right foot:      Skin integrity: No ulcer, skin breakdown, erythema, warmth, callus or dry skin.      Left foot:      Skin integrity: Erythema (see diagram) present. No ulcer, skin breakdown, warmth, callus or dry skin.   Skin:     General: Skin is warm and dry.      Findings: Erythema present. No rash.      Comments: Third toe of right foot      Neurological:      Mental Status: She is alert and oriented to person, place, and time.   Psychiatric:         Mood and Affect: Mood normal.         Behavior: Behavior normal. Behavior is cooperative.         Thought Content: Thought content normal.         " Judgment: Judgment normal.           Patient's shoes and socks removed.    Right Foot/Ankle   Right Foot Inspection  Skin Exam: skin normal and skin intact. No dry skin, no warmth, no callus, no erythema, no maceration, no abnormal color, no pre-ulcer, no ulcer and no callus.     Toe Exam: ROM and strength within normal limits.     Sensory   Monofilament testing: intact    Vascular  Capillary refills: < 3 seconds  The right DP pulse is 2+.     Left Foot/Ankle  Left Foot Inspection  Skin Exam: skin normal, skin intact and erythema (see diagram). No dry skin, no warmth, no maceration, normal color, no pre-ulcer, no ulcer and no callus.     Toe Exam: ROM and strength within normal limits.     Sensory   Monofilament testing: intact    Vascular  Capillary refills: < 3 seconds  The left DP pulse is 2+.     Assign Risk Category  No deformity present  No loss of protective sensation  No weak pulses  Risk: 0          RACHELLE Ospina

## 2024-02-13 ENCOUNTER — HOSPITAL ENCOUNTER (OUTPATIENT)
Dept: INFUSION CENTER | Facility: HOSPITAL | Age: 39
Discharge: HOME | End: 2024-02-13
Attending: INTERNAL MEDICINE

## 2024-02-14 DIAGNOSIS — Z00.6 ENCOUNTER FOR EXAMINATION FOR NORMAL COMPARISON OR CONTROL IN CLINICAL RESEARCH PROGRAM: ICD-10-CM

## 2024-02-15 DIAGNOSIS — R09.82 ALLERGIC RHINITIS WITH POSTNASAL DRIP: Primary | ICD-10-CM

## 2024-02-15 DIAGNOSIS — J30.9 ALLERGIC RHINITIS WITH POSTNASAL DRIP: Primary | ICD-10-CM

## 2024-02-17 ENCOUNTER — OFFICE VISIT (OUTPATIENT)
Dept: URGENT CARE | Facility: CLINIC | Age: 39
End: 2024-02-17
Payer: COMMERCIAL

## 2024-02-17 VITALS
HEART RATE: 80 BPM | TEMPERATURE: 98.4 F | OXYGEN SATURATION: 97 % | DIASTOLIC BLOOD PRESSURE: 84 MMHG | RESPIRATION RATE: 18 BRPM | SYSTOLIC BLOOD PRESSURE: 167 MMHG

## 2024-02-17 DIAGNOSIS — J02.9 SORE THROAT: Primary | ICD-10-CM

## 2024-02-17 LAB — S PYO AG THROAT QL: NEGATIVE

## 2024-02-17 PROCEDURE — 87880 STREP A ASSAY W/OPTIC: CPT | Performed by: PHYSICIAN ASSISTANT

## 2024-02-17 PROCEDURE — G0382 LEV 3 HOSP TYPE B ED VISIT: HCPCS | Performed by: PHYSICIAN ASSISTANT

## 2024-02-17 NOTE — PROGRESS NOTES
[facility]        NAME: Ines Solares is a 38 y.o. female  : 1985    MRN: [id number]  DATE: 2024  TIME: 11:44 AM    Assessment and Plan   Sore throat [J02.9]  1. Sore throat  POCT rapid strep A        Rapid strep negative, per Centor score no further testing needed at this time    Patient Instructions     Patient Instructions   Rapid strep negative.  Discussed sore throat most likely related to postnasal drip and noncompliance with CPAP.  No signs of infection upon examination.  Recommend continuing supportive care.  All patient's questions answered and is agreeable with this plan.      Follow up with PCP in 3-5 days.  Proceed to  ER if symptoms worsen.    Chief Complaint     Chief Complaint   Patient presents with    Sore Throat     Sore throat and ear pain since this morning.          History of Present Illness       Patient is a 38-year-old female presenting today with sore throat and ear pain x 1 day.  Patient notes she awoke this morning with some discomfort of her throat and pain of her left ear, states her tonsils felt like they are swollen, states that she is feeling slightly better now than she did upon awakening this morning, has not taken any medication relieving factors for her symptoms.  Notes that she does have sleep apnea and is supposed to sleep with the CPAP but did not do so last night.  Also admits to slight sensation of postnasal drip.  Denies fever, chills, voice change, trouble swallowing, drooling.    Sore Throat   This is a new problem. The current episode started today. The problem has been unchanged. Neither side of throat is experiencing more pain than the other. There has been no fever. The fever has been present for Less than 1 day. The pain is at a severity of 4/10. Associated symptoms include ear pain. Pertinent negatives include no abdominal pain, congestion, coughing, diarrhea, drooling, ear discharge, headaches, hoarse voice, plugged ear sensation, neck  pain, shortness of breath, stridor or vomiting. She has had no exposure to strep or mono.       Review of Systems   Review of Systems   HENT:  Positive for ear pain and sore throat. Negative for congestion, drooling, ear discharge and hoarse voice.    Respiratory:  Negative for cough, shortness of breath and stridor.    Gastrointestinal:  Negative for abdominal pain, diarrhea and vomiting.   Musculoskeletal:  Negative for neck pain.   Neurological:  Negative for headaches.         Current Medications       Current Outpatient Medications:     albuterol (ProAir HFA) 90 mcg/act inhaler, Inhale 2 puffs every 6 (six) hours as needed for wheezing, Disp: 18 g, Rfl: 3    cyanocobalamin (VITAMIN B-12) 100 mcg tablet, Take 1,000 mcg by mouth daily, Disp: , Rfl:     Diclofenac Sodium (VOLTAREN) 1 %, Apply 2 g topically 4 (four) times a day, Disp: 60 g, Rfl: 3    diclofenac sodium (VOLTAREN) 50 mg EC tablet, Take 1 tablet (50 mg total) by mouth 2 (two) times a day as needed (pain), Disp: 90 tablet, Rfl: 1    escitalopram (LEXAPRO) 20 mg tablet, Take 1 tablet (20 mg total) by mouth daily, Disp: 90 tablet, Rfl: 3    fluticasone (FLONASE) 50 mcg/act nasal spray, 2 sprays into each nostril daily, Disp: 48 g, Rfl: 3    hydrochlorothiazide (HYDRODIURIL) 25 mg tablet, Take 1 tablet (25 mg total) by mouth daily, Disp: 90 tablet, Rfl: 3    levocetirizine (XYZAL) 5 MG tablet, TAKE 1 TABLET EVERY EVENING, Disp: 90 tablet, Rfl: 3    lisinopril (ZESTRIL) 20 mg tablet, Take 1 tablet (20 mg total) by mouth daily, Disp: 90 tablet, Rfl: 3    LOW-DOSE ASPIRIN PO, , Disp: , Rfl:     metFORMIN (GLUCOPHAGE) 500 mg tablet, Take 1 tablet (500 mg total) by mouth 2 (two) times a day with meals, Disp: 180 tablet, Rfl: 3    montelukast (SINGULAIR) 10 mg tablet, Take 1 tablet (10 mg total) by mouth daily at bedtime, Disp: 90 tablet, Rfl: 3    omeprazole (PriLOSEC) 20 mg delayed release capsule, Take 1 capsule (20 mg total) by mouth daily, Disp: 90  capsule, Rfl: 3    rosuvastatin (CRESTOR) 5 mg tablet, Take 1 tablet (5 mg total) by mouth daily, Disp: 90 tablet, Rfl: 3    Synthroid 200 MCG tablet, TAKE 1 TABLET DAILY, Disp: 90 tablet, Rfl: 3    Current Allergies     Allergies as of 2024 - Reviewed 2024   Allergen Reaction Noted    Other  08/15/2018    Vancomycin  08/15/2018            The following portions of the patient's history were reviewed and updated as appropriate: allergies, current medications, past family history, past medical history, past social history, past surgical history and problem list.     Past Medical History:   Diagnosis Date    Anxiety     Asthma     Disease of thyroid gland     GERD (gastroesophageal reflux disease)     Hypertension     MRSA (methicillin resistant Staphylococcus aureus)     right leg & right leg inner thigh       Past Surgical History:   Procedure Laterality Date    BREAST BIOPSY Right     benign     SECTION      x1 live birth     SECTION      LEG SURGERY Right 2015    removal of MRSA    THYROIDECTOMY         Family History   Problem Relation Age of Onset    Cervical cancer Mother     Hypertension Mother     Ovarian cancer Mother     Hypertension Father     Heart disease Maternal Uncle     Breast cancer Paternal Grandmother         My dads mom  of breast cancer    No Known Problems Sister     No Known Problems Daughter     No Known Problems Maternal Grandmother     No Known Problems Maternal Grandfather     No Known Problems Paternal Grandfather     No Known Problems Sister     No Known Problems Daughter     No Known Problems Paternal Aunt     No Known Problems Paternal Aunt     No Known Problems Paternal Aunt     No Known Problems Paternal Aunt     No Known Problems Paternal Aunt     No Known Problems Paternal Aunt     No Known Problems Paternal Aunt          Medications have been verified.        Objective   /84   Pulse 80   Temp 98.4 °F (36.9 °C)   Resp 18   SpO2 97%         Physical Exam     Physical Exam  Vitals and nursing note reviewed.   Constitutional:       General: She is not in acute distress.     Appearance: Normal appearance. She is well-developed. She is obese.   HENT:      Head: Normocephalic.      Right Ear: Tympanic membrane, ear canal and external ear normal.      Left Ear: Tympanic membrane, ear canal and external ear normal.      Nose: Nose normal.      Mouth/Throat:      Mouth: Mucous membranes are moist.      Pharynx: Oropharynx is clear. No oropharyngeal exudate or posterior oropharyngeal erythema.   Eyes:      Conjunctiva/sclera: Conjunctivae normal.   Cardiovascular:      Rate and Rhythm: Normal rate and regular rhythm.      Pulses: Normal pulses.      Heart sounds: Normal heart sounds.   Pulmonary:      Effort: Pulmonary effort is normal.      Breath sounds: Normal breath sounds.   Skin:     General: Skin is warm.      Capillary Refill: Capillary refill takes less than 2 seconds.   Neurological:      Mental Status: She is alert.

## 2024-02-17 NOTE — PATIENT INSTRUCTIONS
Rapid strep negative.  Discussed sore throat most likely related to postnasal drip and noncompliance with CPAP.  No signs of infection upon examination.  Recommend continuing supportive care.  All patient's questions answered and is agreeable with this plan.

## 2024-02-20 ENCOUNTER — HOSPITAL ENCOUNTER (OUTPATIENT)
Dept: INFUSION CENTER | Facility: HOSPITAL | Age: 39
Discharge: HOME | End: 2024-02-20
Attending: INTERNAL MEDICINE

## 2024-02-20 VITALS
RESPIRATION RATE: 18 BRPM | SYSTOLIC BLOOD PRESSURE: 140 MMHG | DIASTOLIC BLOOD PRESSURE: 85 MMHG | HEART RATE: 78 BPM | TEMPERATURE: 99 F | OXYGEN SATURATION: 97 %

## 2024-02-20 DIAGNOSIS — E53.8 B12 DEFICIENCY: ICD-10-CM

## 2024-02-20 DIAGNOSIS — D50.8 OTHER IRON DEFICIENCY ANEMIA: ICD-10-CM

## 2024-02-20 NOTE — PROGRESS NOTES
IV access unable to be obtained after multiple attempts. Patient requests to reschedule. Patient aware of next appointment on 2/27 at 9AM.

## 2024-02-27 ENCOUNTER — HOSPITAL ENCOUNTER (OUTPATIENT)
Dept: INFUSION CENTER | Facility: HOSPITAL | Age: 39
Discharge: HOME | End: 2024-02-27
Attending: INTERNAL MEDICINE
Payer: COMMERCIAL

## 2024-02-27 VITALS
SYSTOLIC BLOOD PRESSURE: 140 MMHG | DIASTOLIC BLOOD PRESSURE: 65 MMHG | TEMPERATURE: 97.6 F | OXYGEN SATURATION: 96 % | HEART RATE: 88 BPM | RESPIRATION RATE: 18 BRPM

## 2024-02-27 DIAGNOSIS — D50.8 OTHER IRON DEFICIENCY ANEMIA: Primary | ICD-10-CM

## 2024-02-27 DIAGNOSIS — E53.8 B12 DEFICIENCY: ICD-10-CM

## 2024-02-27 PROCEDURE — 96365 THER/PROPH/DIAG IV INF INIT: CPT

## 2024-02-27 PROCEDURE — 96372 THER/PROPH/DIAG INJ SC/IM: CPT

## 2024-02-27 RX ORDER — SODIUM CHLORIDE 9 MG/ML
20 INJECTION, SOLUTION INTRAVENOUS ONCE
Status: COMPLETED | OUTPATIENT
Start: 2024-02-27 | End: 2024-02-27

## 2024-02-27 RX ORDER — SODIUM CHLORIDE 9 MG/ML
20 INJECTION, SOLUTION INTRAVENOUS ONCE
Status: CANCELLED | OUTPATIENT
Start: 2024-03-05

## 2024-02-27 RX ORDER — CYANOCOBALAMIN 1000 UG/ML
1000 INJECTION, SOLUTION INTRAMUSCULAR; SUBCUTANEOUS ONCE
Status: CANCELLED | OUTPATIENT
Start: 2024-03-05 | End: 2024-03-05

## 2024-02-27 RX ORDER — CYANOCOBALAMIN 1000 UG/ML
1000 INJECTION, SOLUTION INTRAMUSCULAR; SUBCUTANEOUS ONCE
Status: COMPLETED | OUTPATIENT
Start: 2024-02-27 | End: 2024-02-27

## 2024-02-27 RX ADMIN — SODIUM CHLORIDE 20 ML/HR: 0.9 INJECTION, SOLUTION INTRAVENOUS at 09:22

## 2024-02-27 RX ADMIN — CYANOCOBALAMIN 1000 MCG: 1000 INJECTION, SOLUTION INTRAMUSCULAR at 09:24

## 2024-02-27 RX ADMIN — IRON SUCROSE 200 MG: 20 INJECTION, SOLUTION INTRAVENOUS at 09:22

## 2024-02-27 NOTE — PROGRESS NOTES
Patient denies current infection or being on antibiotics.  Patient tolerated IV Venofer without reaction or issues.  B 12 injection given in left deltoid without issues.    Ines Solares is aware of future appt on 3/5/24 at 0830.     AVS - No (Declined by Ines Solares).  Patient has Mychart.    Patient ambulated off unit without incident.  All personal belongings taken with patient.

## 2024-02-29 DIAGNOSIS — K04.7 DENTAL INFECTION: Primary | ICD-10-CM

## 2024-02-29 RX ORDER — AMOXICILLIN 500 MG/1
500 CAPSULE ORAL EVERY 8 HOURS SCHEDULED
Qty: 30 CAPSULE | Refills: 0 | Status: SHIPPED | OUTPATIENT
Start: 2024-02-29 | End: 2024-03-10

## 2024-03-05 ENCOUNTER — HOSPITAL ENCOUNTER (OUTPATIENT)
Dept: INFUSION CENTER | Facility: HOSPITAL | Age: 39
Discharge: HOME | End: 2024-03-05
Payer: COMMERCIAL

## 2024-03-05 VITALS
SYSTOLIC BLOOD PRESSURE: 148 MMHG | HEART RATE: 82 BPM | RESPIRATION RATE: 16 BRPM | DIASTOLIC BLOOD PRESSURE: 85 MMHG | TEMPERATURE: 96.1 F

## 2024-03-05 DIAGNOSIS — E53.8 B12 DEFICIENCY: ICD-10-CM

## 2024-03-05 DIAGNOSIS — D50.8 OTHER IRON DEFICIENCY ANEMIA: Primary | ICD-10-CM

## 2024-03-05 RX ORDER — SODIUM CHLORIDE 9 MG/ML
20 INJECTION, SOLUTION INTRAVENOUS ONCE
Status: COMPLETED | OUTPATIENT
Start: 2024-03-05 | End: 2024-03-05

## 2024-03-05 RX ORDER — SODIUM CHLORIDE 9 MG/ML
20 INJECTION, SOLUTION INTRAVENOUS ONCE
Status: CANCELLED | OUTPATIENT
Start: 2024-03-05

## 2024-03-05 RX ORDER — CYANOCOBALAMIN 1000 UG/ML
1000 INJECTION, SOLUTION INTRAMUSCULAR; SUBCUTANEOUS ONCE
Status: CANCELLED | OUTPATIENT
Start: 2024-03-05 | End: 2024-03-05

## 2024-03-05 RX ORDER — CYANOCOBALAMIN 1000 UG/ML
1000 INJECTION, SOLUTION INTRAMUSCULAR; SUBCUTANEOUS ONCE
Status: COMPLETED | OUTPATIENT
Start: 2024-03-05 | End: 2024-03-05

## 2024-03-05 RX ADMIN — CYANOCOBALAMIN 1000 MCG: 1000 INJECTION, SOLUTION INTRAMUSCULAR at 08:54

## 2024-03-05 RX ADMIN — SODIUM CHLORIDE 20 ML/HR: 0.9 INJECTION, SOLUTION INTRAVENOUS at 08:48

## 2024-03-05 RX ADMIN — IRON SUCROSE 200 MG: 20 INJECTION, SOLUTION INTRAVENOUS at 08:51

## 2024-03-05 NOTE — PROGRESS NOTES
Patient denies current infection or being on antibiotics.  Patient tolerated IV Venofer without reaction or issues.  B 12 injection given in right deltoid without issues.  Patient completed ordered treatment.    Ines Solares is aware of future appt on 7/23/24 at 0940 with Dr. Yeh.       AVS -  No (Declined by Ines Solares)     Patient ambulated off unit without incident.  All personal belongings taken with patient.

## 2024-04-23 DIAGNOSIS — A49.02 MRSA INFECTION: Primary | ICD-10-CM

## 2024-04-23 RX ORDER — SULFAMETHOXAZOLE AND TRIMETHOPRIM 800; 160 MG/1; MG/1
1 TABLET ORAL EVERY 12 HOURS SCHEDULED
Qty: 20 TABLET | Refills: 0 | Status: SHIPPED | OUTPATIENT
Start: 2024-04-23 | End: 2024-05-03

## 2024-05-16 ENCOUNTER — CLINICAL SUPPORT (OUTPATIENT)
Dept: FAMILY MEDICINE CLINIC | Facility: CLINIC | Age: 39
End: 2024-05-16

## 2024-05-16 ENCOUNTER — TELEPHONE (OUTPATIENT)
Age: 39
End: 2024-05-16

## 2024-05-16 ENCOUNTER — TELEPHONE (OUTPATIENT)
Dept: FAMILY MEDICINE CLINIC | Facility: CLINIC | Age: 39
End: 2024-05-16

## 2024-05-16 VITALS — SYSTOLIC BLOOD PRESSURE: 144 MMHG | OXYGEN SATURATION: 98 % | DIASTOLIC BLOOD PRESSURE: 92 MMHG | HEART RATE: 78 BPM

## 2024-05-16 DIAGNOSIS — I10 PRIMARY HYPERTENSION: Primary | ICD-10-CM

## 2024-05-16 PROCEDURE — 99211 OFF/OP EST MAY X REQ PHY/QHP: CPT

## 2024-05-16 NOTE — PROGRESS NOTES
"Patient came into the office for BP check. Reports having HA that comes and goes. Experiencing lightheadedness intermittently, states she gets a \"weird\" chest pain intermittently, unsure if this is from anxiety or bp. No SOB.      BP: 160/90  Pulse: 78  O2: 98    On recheck BP: 144/92      Spoke with PCP in regards to this. Wants pt to up lisinopril to 40mg and come back for BP check next week. Informed pt and scheduled.  "
85

## 2024-05-16 NOTE — TELEPHONE ENCOUNTER
Patient called back in as she was on hold awaiting to see if there is a charge for today's visit.  I placed patient on hold  I then confirmed  with Laura/clerical what charge may be and she stated no cost for a Nurse Visit.  I advised patient that Nurse visit today there should no charge according to Laura.  Advised patient to confirm at check in.

## 2024-05-21 ENCOUNTER — CLINICAL SUPPORT (OUTPATIENT)
Dept: FAMILY MEDICINE CLINIC | Facility: CLINIC | Age: 39
End: 2024-05-21

## 2024-05-21 VITALS — SYSTOLIC BLOOD PRESSURE: 126 MMHG | DIASTOLIC BLOOD PRESSURE: 88 MMHG

## 2024-05-21 DIAGNOSIS — I10 PRIMARY HYPERTENSION: Primary | ICD-10-CM

## 2024-05-21 PROCEDURE — 99211 OFF/OP EST MAY X REQ PHY/QHP: CPT

## 2024-06-04 ENCOUNTER — TELEPHONE (OUTPATIENT)
Dept: SLEEP CENTER | Facility: CLINIC | Age: 39
End: 2024-06-04

## 2024-07-30 DIAGNOSIS — I10 HYPERTENSION, UNSPECIFIED TYPE: ICD-10-CM

## 2024-07-30 RX ORDER — LISINOPRIL 20 MG/1
20 TABLET ORAL DAILY
Qty: 100 TABLET | Refills: 1 | Status: SHIPPED | OUTPATIENT
Start: 2024-07-30

## 2024-08-01 DIAGNOSIS — B34.9 ACUTE VIRAL SYNDROME: ICD-10-CM

## 2024-08-01 DIAGNOSIS — R05.1 ACUTE COUGH: ICD-10-CM

## 2024-08-01 RX ORDER — ALBUTEROL SULFATE 90 UG/1
2 AEROSOL, METERED RESPIRATORY (INHALATION) EVERY 6 HOURS PRN
Qty: 18 G | Refills: 5 | Status: SHIPPED | OUTPATIENT
Start: 2024-08-01

## 2024-08-02 ENCOUNTER — TELEPHONE (OUTPATIENT)
Dept: OBGYN CLINIC | Facility: MEDICAL CENTER | Age: 39
End: 2024-08-02

## 2024-08-02 DIAGNOSIS — F41.9 ANXIETY: ICD-10-CM

## 2024-08-02 DIAGNOSIS — E11.9 TYPE 2 DIABETES MELLITUS WITHOUT COMPLICATION, WITHOUT LONG-TERM CURRENT USE OF INSULIN (HCC): ICD-10-CM

## 2024-08-02 DIAGNOSIS — K21.00 GASTROESOPHAGEAL REFLUX DISEASE WITH ESOPHAGITIS WITHOUT HEMORRHAGE: ICD-10-CM

## 2024-08-02 DIAGNOSIS — E03.9 HYPOTHYROIDISM, UNSPECIFIED TYPE: ICD-10-CM

## 2024-08-02 DIAGNOSIS — E78.5 HYPERLIPIDEMIA LDL GOAL <70: ICD-10-CM

## 2024-08-02 DIAGNOSIS — E53.8 B12 DEFICIENCY: Primary | ICD-10-CM

## 2024-08-02 RX ORDER — LEVOTHYROXINE SODIUM 200 MCG
200 TABLET ORAL DAILY
Qty: 100 TABLET | Refills: 1 | Status: SHIPPED | OUTPATIENT
Start: 2024-08-02 | End: 2024-08-12 | Stop reason: SDUPTHER

## 2024-08-02 RX ORDER — ESCITALOPRAM OXALATE 20 MG/1
20 TABLET ORAL DAILY
Qty: 100 TABLET | Refills: 1 | Status: SHIPPED | OUTPATIENT
Start: 2024-08-02

## 2024-08-02 RX ORDER — ROSUVASTATIN CALCIUM 5 MG/1
5 TABLET, COATED ORAL DAILY
Qty: 100 TABLET | Refills: 1 | Status: SHIPPED | OUTPATIENT
Start: 2024-08-02

## 2024-08-02 RX ORDER — OMEPRAZOLE 20 MG/1
20 CAPSULE, DELAYED RELEASE ORAL DAILY
Qty: 100 CAPSULE | Refills: 1 | Status: SHIPPED | OUTPATIENT
Start: 2024-08-02

## 2024-08-05 ENCOUNTER — HOSPITAL ENCOUNTER (EMERGENCY)
Facility: HOSPITAL | Age: 39
Discharge: HOME | End: 2024-08-05
Attending: EMERGENCY MEDICINE
Payer: COMMERCIAL

## 2024-08-05 ENCOUNTER — TELEPHONE (OUTPATIENT)
Age: 39
End: 2024-08-05

## 2024-08-05 VITALS
OXYGEN SATURATION: 96 % | RESPIRATION RATE: 18 BRPM | TEMPERATURE: 97.8 F | DIASTOLIC BLOOD PRESSURE: 102 MMHG | SYSTOLIC BLOOD PRESSURE: 168 MMHG | HEART RATE: 116 BPM

## 2024-08-05 DIAGNOSIS — K04.7 DENTAL ABSCESS: Primary | ICD-10-CM

## 2024-08-05 PROCEDURE — 99284 EMERGENCY DEPT VISIT MOD MDM: CPT | Performed by: EMERGENCY MEDICINE

## 2024-08-05 PROCEDURE — 99283 EMERGENCY DEPT VISIT LOW MDM: CPT

## 2024-08-05 PROCEDURE — 96372 THER/PROPH/DIAG INJ SC/IM: CPT

## 2024-08-05 PROCEDURE — 41800 DRAINAGE OF GUM LESION: CPT | Performed by: EMERGENCY MEDICINE

## 2024-08-05 RX ORDER — KETOROLAC TROMETHAMINE 30 MG/ML
30 INJECTION, SOLUTION INTRAMUSCULAR; INTRAVENOUS ONCE
Status: COMPLETED | OUTPATIENT
Start: 2024-08-05 | End: 2024-08-05

## 2024-08-05 RX ORDER — AMOXICILLIN AND CLAVULANATE POTASSIUM 875; 125 MG/1; MG/1
1 TABLET, FILM COATED ORAL EVERY 12 HOURS
Qty: 14 TABLET | Refills: 0 | Status: SHIPPED | OUTPATIENT
Start: 2024-08-05 | End: 2024-08-12

## 2024-08-05 RX ORDER — AMOXICILLIN AND CLAVULANATE POTASSIUM 875; 125 MG/1; MG/1
1 TABLET, FILM COATED ORAL ONCE
Status: COMPLETED | OUTPATIENT
Start: 2024-08-05 | End: 2024-08-05

## 2024-08-05 RX ADMIN — AMOXICILLIN AND CLAVULANATE POTASSIUM 1 TABLET: 875; 125 TABLET, FILM COATED ORAL at 16:27

## 2024-08-05 RX ADMIN — KETOROLAC TROMETHAMINE 30 MG: 30 INJECTION, SOLUTION INTRAMUSCULAR at 16:27

## 2024-08-05 NOTE — TELEPHONE ENCOUNTER
[facility] called to have lab orders place for pt labs has to be doen in then last 4 weeks.

## 2024-08-05 NOTE — TELEPHONE ENCOUNTER
I don't understand the above message, but it sounds like this can wait until PCP returns later this week.

## 2024-08-05 NOTE — ED PROVIDER NOTES
History  Chief Complaint   Patient presents with    Dental Pain     Patient comes in with dental pain on the right upper side. Reports possible abscess that she is feeling pressure in her face as well. Patient denies any fever or chills.      Patient is a 39-year-old female with history of poor dentition who presents for evaluation of dental pain.  She complains about some swelling and pain over her right upper molars.  No trismus drooling or voice change.  She denies fevers and has been able to eat and drink despite her symptoms.  Taking over-the-counter NSAIDs with some relief.  She denies vomiting.      Prior to Admission Medications   Prescriptions Last Dose Informant Patient Reported? Taking?   Diclofenac Sodium (VOLTAREN) 1 %  Self No No   Sig: Apply 2 g topically 4 (four) times a day   LOW-DOSE ASPIRIN PO  Self Yes No   Synthroid 200 MCG tablet   No No   Sig: Take 1 tablet (200 mcg total) by mouth daily   albuterol (ProAir HFA) 90 mcg/act inhaler   No No   Sig: Inhale 2 puffs every 6 (six) hours as needed for wheezing   cyanocobalamin (VITAMIN B-12) 100 mcg tablet   Yes No   Sig: Take 1,000 mcg by mouth daily   diclofenac sodium (VOLTAREN) 50 mg EC tablet  Self No No   Sig: Take 1 tablet (50 mg total) by mouth 2 (two) times a day as needed (pain)   escitalopram (LEXAPRO) 20 mg tablet   No No   Sig: Take 1 tablet (20 mg total) by mouth daily   fluticasone (FLONASE) 50 mcg/act nasal spray  Self No No   Si sprays into each nostril daily   hydrochlorothiazide (HYDRODIURIL) 25 mg tablet  Self No No   Sig: Take 1 tablet (25 mg total) by mouth daily   levocetirizine (XYZAL) 5 MG tablet  Self No No   Sig: TAKE 1 TABLET EVERY EVENING   lisinopril (ZESTRIL) 20 mg tablet   No No   Sig: Take 1 tablet (20 mg total) by mouth daily   metFORMIN (GLUCOPHAGE) 500 mg tablet   No No   Sig: Take 1 tablet (500 mg total) by mouth 2 (two) times a day with meals   montelukast (SINGULAIR) 10 mg tablet  Self No No   Sig: Take 1  tablet (10 mg total) by mouth daily at bedtime   omeprazole (PriLOSEC) 20 mg delayed release capsule   No No   Sig: Take 1 capsule (20 mg total) by mouth daily   rosuvastatin (CRESTOR) 5 mg tablet   No No   Sig: Take 1 tablet (5 mg total) by mouth daily      Facility-Administered Medications: None       Past Medical History:   Diagnosis Date    Anxiety     Asthma     Disease of thyroid gland     GERD (gastroesophageal reflux disease)     Hypertension     MRSA (methicillin resistant Staphylococcus aureus)     right leg & right leg inner thigh       Past Surgical History:   Procedure Laterality Date    BREAST BIOPSY Right     benign     SECTION      x1 live birth     SECTION      LEG SURGERY Right 2015    removal of MRSA    THYROIDECTOMY  2015       Family History   Problem Relation Age of Onset    Cervical cancer Mother     Hypertension Mother     Ovarian cancer Mother     Hypertension Father     Heart disease Maternal Uncle     Breast cancer Paternal Grandmother         My dads mom  of breast cancer    No Known Problems Sister     No Known Problems Daughter     No Known Problems Maternal Grandmother     No Known Problems Maternal Grandfather     No Known Problems Paternal Grandfather     No Known Problems Sister     No Known Problems Daughter     No Known Problems Paternal Aunt     No Known Problems Paternal Aunt     No Known Problems Paternal Aunt     No Known Problems Paternal Aunt     No Known Problems Paternal Aunt     No Known Problems Paternal Aunt     No Known Problems Paternal Aunt      I have reviewed and agree with the history as documented.    E-Cigarette/Vaping    E-Cigarette Use Never User      E-Cigarette/Vaping Substances    Nicotine No     THC No     CBD No     Flavoring No     Other No     Unknown No      Social History     Tobacco Use    Smoking status: Never    Smokeless tobacco: Never   Vaping Use    Vaping status: Never Used   Substance Use Topics    Alcohol use: Not  Currently     Comment: socially    Drug use: Never       Review of Systems   Constitutional:  Negative for fever.   HENT:  Positive for dental problem. Negative for sore throat.    Respiratory:  Negative for shortness of breath.    Cardiovascular:  Negative for chest pain.   Gastrointestinal:  Negative for abdominal pain.   Genitourinary:  Negative for dysuria.   Musculoskeletal:  Negative for back pain.   Skin:  Negative for rash.   Neurological:  Negative for light-headedness.   Psychiatric/Behavioral:  Negative for agitation.    All other systems reviewed and are negative.      Physical Exam  Physical Exam  Vitals reviewed.   Constitutional:       General: She is not in acute distress.     Appearance: She is well-developed.   HENT:      Head: Normocephalic.      Mouth/Throat:      Comments: With pain over tooth to, severe cavities seen.  Small abscess seen over the gingiva as well.  No trismus drooling, oropharynx otherwise clear  Eyes:      Pupils: Pupils are equal, round, and reactive to light.   Cardiovascular:      Rate and Rhythm: Normal rate and regular rhythm.      Heart sounds: Normal heart sounds.   Pulmonary:      Effort: Pulmonary effort is normal.      Breath sounds: Normal breath sounds.   Abdominal:      General: Bowel sounds are normal. There is no distension.      Palpations: Abdomen is soft.      Tenderness: There is no abdominal tenderness. There is no guarding.   Musculoskeletal:         General: No tenderness or deformity. Normal range of motion.      Cervical back: Normal range of motion and neck supple.   Skin:     General: Skin is warm and dry.      Capillary Refill: Capillary refill takes less than 2 seconds.   Neurological:      Mental Status: She is alert and oriented to person, place, and time.      Cranial Nerves: No cranial nerve deficit.      Sensory: No sensory deficit.   Psychiatric:         Behavior: Behavior normal.         Thought Content: Thought content normal.          Judgment: Judgment normal.         Vital Signs  ED Triage Vitals [08/05/24 1615]   Temperature Pulse Respirations Blood Pressure SpO2   97.8 °F (36.6 °C) (!) 116 18 (!) 168/102 96 %      Temp Source Heart Rate Source Patient Position - Orthostatic VS BP Location FiO2 (%)   Temporal Monitor -- -- --      Pain Score       8           Vitals:    08/05/24 1615   BP: (!) 168/102   Pulse: (!) 116         Visual Acuity      ED Medications  Medications   amoxicillin-clavulanate (AUGMENTIN) 875-125 mg per tablet 1 tablet (1 tablet Oral Given 8/5/24 1627)   ketorolac (TORADOL) injection 30 mg (30 mg Intramuscular Given 8/5/24 1627)       Diagnostic Studies  Results Reviewed       None                   No orders to display              Procedures  Incision and drain    Date/Time: 8/5/2024 5:13 PM    Performed by: Chaparro Bautista MD  Authorized by: Chaparro Bautista MD  Universal Protocol:  Consent: Verbal consent obtained.    Patient location:  ED  Location:     Location:  Mouth    Location Detail:  Intraoral  Procedure details:     Complexity:  Simple    Incision types:  Stab incision    Incision depth:  Subcutaneous    Drainage:  Purulent    Drainage amount:  Scant    Wound treatment:  Wound left open    Packing materials:  None  Post-procedure details:     Patient tolerance of procedure:  Tolerated well, no immediate complications           ED Course                                 SBIRT 20yo+      Flowsheet Row Most Recent Value   Initial Alcohol Screen: US AUDIT-C     1. How often do you have a drink containing alcohol? 0 Filed at: 08/05/2024 1615   2. How many drinks containing alcohol do you have on a typical day you are drinking?  0 Filed at: 08/05/2024 1615   3a. Male UNDER 65: How often do you have five or more drinks on one occasion? 0 Filed at: 08/05/2024 1615   3b. FEMALE Any Age, or MALE 65+: How often do you have 4 or more drinks on one occassion? 0 Filed at: 08/05/2024 1615   Audit-C Score 0 Filed at:  08/05/2024 1615   TRU: How many times in the past year have you...    Used an illegal drug or used a prescription medication for non-medical reasons? Never Filed at: 08/05/2024 1615                      Medical Decision Making  Patient is a 39-year-old female presents with dental abscess.  Drained here with some purulent drainage released.  Started on antibiotics and advised dental follow-up.    Risk  Prescription drug management.                 Disposition  Final diagnoses:   Dental abscess     Time reflects when diagnosis was documented in both MDM as applicable and the Disposition within this note       Time User Action Codes Description Comment    8/5/2024  4:35 PM Chaparro Bautista Add [K04.7] Dental abscess           ED Disposition       ED Disposition   Discharge    Condition   Stable    Date/Time   Mon Aug 5, 2024 1635    Comment   Ines Solares discharge to home/self care.                   Follow-up Information       Follow up With Specialties Details Why Contact Info Additional Information    [facility] Emergency Department Emergency Medicine  If symptoms worsen [address] [facility] [address]  [phone] [facility] Emergency Department, [address] [place], [place] [id number] [place] [address] Dental Clinic  Schedule an appointment as soon as possible for a visit   [address] [facility] [address] [phone]     [facility]  Schedule an appointment as soon as possible for a visit   [address]  [facility] [address]  [phone]             Discharge Medication List as of 8/5/2024  4:36 PM        START taking these medications    Details   amoxicillin-clavulanate (AUGMENTIN) 875-125 mg per tablet Take 1 tablet by mouth every 12 (twelve) hours for 7 days, Starting Mon 8/5/2024, Until Mon 8/12/2024, Normal           CONTINUE these medications which have NOT CHANGED     Details   albuterol (ProAir HFA) 90 mcg/act inhaler Inhale 2 puffs every 6 (six) hours as needed for wheezing, Starting Thu 8/1/2024, Normal      cyanocobalamin (VITAMIN B-12) 100 mcg tablet Take 1,000 mcg by mouth daily, Historical Med      Diclofenac Sodium (VOLTAREN) 1 % Apply 2 g topically 4 (four) times a day, Starting Wed 12/20/2023, Normal      diclofenac sodium (VOLTAREN) 50 mg EC tablet Take 1 tablet (50 mg total) by mouth 2 (two) times a day as needed (pain), Starting Thu 12/8/2022, Normal      escitalopram (LEXAPRO) 20 mg tablet Take 1 tablet (20 mg total) by mouth daily, Starting Fri 8/2/2024, Normal      fluticasone (FLONASE) 50 mcg/act nasal spray 2 sprays into each nostril daily, Starting Mon 2/13/2023, Normal      hydrochlorothiazide (HYDRODIURIL) 25 mg tablet Take 1 tablet (25 mg total) by mouth daily, Starting Tue 1/3/2023, Normal      levocetirizine (XYZAL) 5 MG tablet TAKE 1 TABLET EVERY EVENING, Normal      lisinopril (ZESTRIL) 20 mg tablet Take 1 tablet (20 mg total) by mouth daily, Starting Tue 7/30/2024, Normal      LOW-DOSE ASPIRIN PO Starting Sun 5/1/2022, Historical Med      metFORMIN (GLUCOPHAGE) 500 mg tablet Take 1 tablet (500 mg total) by mouth 2 (two) times a day with meals, Starting Fri 8/2/2024, Normal      montelukast (SINGULAIR) 10 mg tablet Take 1 tablet (10 mg total) by mouth daily at bedtime, Starting Thu 5/11/2023, Normal      omeprazole (PriLOSEC) 20 mg delayed release capsule Take 1 capsule (20 mg total) by mouth daily, Starting Fri 8/2/2024, Normal      rosuvastatin (CRESTOR) 5 mg tablet Take 1 tablet (5 mg total) by mouth daily, Starting Fri 8/2/2024, Normal      Synthroid 200 MCG tablet Take 1 tablet (200 mcg total) by mouth daily, Starting Fri 8/2/2024, Normal             No discharge procedures on file.    PDMP Review       None            ED Provider  Electronically Signed by             Chaparro Bautista MD  08/05/24 8300

## 2024-08-06 ENCOUNTER — TELEPHONE (OUTPATIENT)
Dept: FAMILY MEDICINE CLINIC | Facility: CLINIC | Age: 39
End: 2024-08-06

## 2024-08-06 ENCOUNTER — APPOINTMENT (OUTPATIENT)
Dept: LAB | Facility: CLINIC | Age: 39
End: 2024-08-06
Payer: COMMERCIAL

## 2024-08-06 DIAGNOSIS — E11.9 TYPE 2 DIABETES MELLITUS WITHOUT COMPLICATION, WITHOUT LONG-TERM CURRENT USE OF INSULIN (HCC): ICD-10-CM

## 2024-08-06 DIAGNOSIS — E53.8 B12 DEFICIENCY: ICD-10-CM

## 2024-08-06 DIAGNOSIS — E78.5 HYPERLIPIDEMIA LDL GOAL <70: ICD-10-CM

## 2024-08-06 DIAGNOSIS — E03.9 HYPOTHYROIDISM, UNSPECIFIED TYPE: ICD-10-CM

## 2024-08-06 DIAGNOSIS — D50.8 OTHER IRON DEFICIENCY ANEMIA: ICD-10-CM

## 2024-08-06 DIAGNOSIS — Z00.6 ENCOUNTER FOR EXAMINATION FOR NORMAL COMPARISON OR CONTROL IN CLINICAL RESEARCH PROGRAM: ICD-10-CM

## 2024-08-06 LAB
ALBUMIN SERPL BCG-MCNC: 4 G/DL (ref 3.5–5)
ALP SERPL-CCNC: 87 U/L (ref 34–104)
ALT SERPL W P-5'-P-CCNC: 11 U/L (ref 7–52)
ANION GAP SERPL CALCULATED.3IONS-SCNC: 13 MMOL/L (ref 4–13)
AST SERPL W P-5'-P-CCNC: 10 U/L (ref 13–39)
BASOPHILS # BLD AUTO: 0.06 THOUSANDS/ÂΜL (ref 0–0.1)
BASOPHILS NFR BLD AUTO: 1 % (ref 0–1)
BILIRUB SERPL-MCNC: 0.54 MG/DL (ref 0.2–1)
BUN SERPL-MCNC: 14 MG/DL (ref 5–25)
CALCIUM SERPL-MCNC: 9.3 MG/DL (ref 8.4–10.2)
CHLORIDE SERPL-SCNC: 99 MMOL/L (ref 96–108)
CHOLEST SERPL-MCNC: 170 MG/DL
CO2 SERPL-SCNC: 24 MMOL/L (ref 21–32)
CREAT SERPL-MCNC: 0.75 MG/DL (ref 0.6–1.3)
CRP SERPL QL: 51.2 MG/L
EOSINOPHIL # BLD AUTO: 0.14 THOUSAND/ÂΜL (ref 0–0.61)
EOSINOPHIL NFR BLD AUTO: 1 % (ref 0–6)
ERYTHROCYTE [DISTWIDTH] IN BLOOD BY AUTOMATED COUNT: 15.3 % (ref 11.6–15.1)
ERYTHROCYTE [SEDIMENTATION RATE] IN BLOOD: 78 MM/HOUR (ref 0–19)
EST. AVERAGE GLUCOSE BLD GHB EST-MCNC: 183 MG/DL
FERRITIN SERPL-MCNC: 74 NG/ML (ref 11–307)
GFR SERPL CREATININE-BSD FRML MDRD: 99 ML/MIN/1.73SQ M
GLUCOSE P FAST SERPL-MCNC: 287 MG/DL (ref 65–99)
HBA1C MFR BLD: 8 %
HCT VFR BLD AUTO: 42.9 % (ref 34.8–46.1)
HDLC SERPL-MCNC: 49 MG/DL
HGB BLD-MCNC: 13.3 G/DL (ref 11.5–15.4)
IMM GRANULOCYTES # BLD AUTO: 0.07 THOUSAND/UL (ref 0–0.2)
IMM GRANULOCYTES NFR BLD AUTO: 1 % (ref 0–2)
IRON SATN MFR SERPL: 20 % (ref 15–50)
IRON SERPL-MCNC: 78 UG/DL (ref 50–212)
LDLC SERPL CALC-MCNC: 89 MG/DL (ref 0–100)
LYMPHOCYTES # BLD AUTO: 1.98 THOUSANDS/ÂΜL (ref 0.6–4.47)
LYMPHOCYTES NFR BLD AUTO: 20 % (ref 14–44)
MCH RBC QN AUTO: 26.3 PG (ref 26.8–34.3)
MCHC RBC AUTO-ENTMCNC: 31 G/DL (ref 31.4–37.4)
MCV RBC AUTO: 85 FL (ref 82–98)
MONOCYTES # BLD AUTO: 0.53 THOUSAND/ÂΜL (ref 0.17–1.22)
MONOCYTES NFR BLD AUTO: 6 % (ref 4–12)
NEUTROPHILS # BLD AUTO: 6.92 THOUSANDS/ÂΜL (ref 1.85–7.62)
NEUTS SEG NFR BLD AUTO: 71 % (ref 43–75)
NRBC BLD AUTO-RTO: 0 /100 WBCS
PLATELET # BLD AUTO: 273 THOUSANDS/UL (ref 149–390)
PMV BLD AUTO: 9.8 FL (ref 8.9–12.7)
POTASSIUM SERPL-SCNC: 3.8 MMOL/L (ref 3.5–5.3)
PROT SERPL-MCNC: 8.3 G/DL (ref 6.4–8.4)
RBC # BLD AUTO: 5.06 MILLION/UL (ref 3.81–5.12)
SODIUM SERPL-SCNC: 136 MMOL/L (ref 135–147)
TIBC SERPL-MCNC: 384 UG/DL (ref 250–450)
TRIGL SERPL-MCNC: 158 MG/DL
TSH SERPL DL<=0.05 MIU/L-ACNC: 0.91 UIU/ML (ref 0.45–4.5)
UIBC SERPL-MCNC: 306 UG/DL (ref 155–355)
VIT B12 SERPL-MCNC: 341 PG/ML (ref 180–914)
WBC # BLD AUTO: 9.7 THOUSAND/UL (ref 4.31–10.16)

## 2024-08-06 PROCEDURE — 85025 COMPLETE CBC W/AUTO DIFF WBC: CPT

## 2024-08-06 PROCEDURE — 36415 COLL VENOUS BLD VENIPUNCTURE: CPT

## 2024-08-06 PROCEDURE — 80061 LIPID PANEL: CPT

## 2024-08-06 PROCEDURE — 83550 IRON BINDING TEST: CPT

## 2024-08-06 PROCEDURE — 84443 ASSAY THYROID STIM HORMONE: CPT

## 2024-08-06 PROCEDURE — 82728 ASSAY OF FERRITIN: CPT

## 2024-08-06 PROCEDURE — 86140 C-REACTIVE PROTEIN: CPT

## 2024-08-06 PROCEDURE — 83540 ASSAY OF IRON: CPT

## 2024-08-06 PROCEDURE — 80053 COMPREHEN METABOLIC PANEL: CPT

## 2024-08-06 PROCEDURE — 82607 VITAMIN B-12: CPT

## 2024-08-06 PROCEDURE — 83036 HEMOGLOBIN GLYCOSYLATED A1C: CPT

## 2024-08-06 PROCEDURE — 85652 RBC SED RATE AUTOMATED: CPT

## 2024-08-06 NOTE — TELEPHONE ENCOUNTER
PT called to make appt, PT LVM indicating that they were at ER for abscessed tooth 8/5.  I returned call and LVM.    SB

## 2024-08-07 RX ORDER — UBIDECARENONE 75 MG
1000 CAPSULE ORAL DAILY
Qty: 90 TABLET | Refills: 3 | Status: SHIPPED | OUTPATIENT
Start: 2024-08-07 | End: 2024-08-08

## 2024-08-08 DIAGNOSIS — E53.8 B12 DEFICIENCY: ICD-10-CM

## 2024-08-08 RX ORDER — MENTHOL 5.8 MG/1
LOZENGE ORAL
Qty: 90 TABLET | Refills: 3 | Status: SHIPPED | OUTPATIENT
Start: 2024-08-08

## 2024-08-08 NOTE — TELEPHONE ENCOUNTER
Can you clarify what labs Hematology is asking to be ordered? I'm fine with these being ordered. Thanks!

## 2024-08-12 ENCOUNTER — TELEPHONE (OUTPATIENT)
Dept: FAMILY MEDICINE CLINIC | Facility: CLINIC | Age: 39
End: 2024-08-12

## 2024-08-12 ENCOUNTER — OFFICE VISIT (OUTPATIENT)
Dept: SLEEP CENTER | Facility: CLINIC | Age: 39
End: 2024-08-12
Payer: COMMERCIAL

## 2024-08-12 VITALS
SYSTOLIC BLOOD PRESSURE: 140 MMHG | TEMPERATURE: 98.3 F | WEIGHT: 243.4 LBS | DIASTOLIC BLOOD PRESSURE: 76 MMHG | OXYGEN SATURATION: 98 % | HEART RATE: 89 BPM | HEIGHT: 66 IN | BODY MASS INDEX: 39.12 KG/M2

## 2024-08-12 DIAGNOSIS — G47.33 OBSTRUCTIVE SLEEP APNEA TREATED WITH CONTINUOUS POSITIVE AIRWAY PRESSURE (CPAP): Primary | ICD-10-CM

## 2024-08-12 DIAGNOSIS — E03.9 HYPOTHYROIDISM, UNSPECIFIED TYPE: ICD-10-CM

## 2024-08-12 PROBLEM — Z78.9 DIFFICULTY USING CONTINUOUS POSITIVE AIRWAY PRESSURE (CPAP) DEVICE: Status: RESOLVED | Noted: 2023-06-05 | Resolved: 2024-08-12

## 2024-08-12 PROBLEM — G47.19 EXCESSIVE DAYTIME SLEEPINESS: Status: RESOLVED | Noted: 2023-06-05 | Resolved: 2024-08-12

## 2024-08-12 PROCEDURE — 99214 OFFICE O/P EST MOD 30 MIN: CPT | Performed by: NURSE PRACTITIONER

## 2024-08-12 RX ORDER — LEVOTHYROXINE SODIUM 200 MCG
200 TABLET ORAL DAILY
Qty: 100 TABLET | Refills: 1 | Status: SHIPPED | OUTPATIENT
Start: 2024-08-12

## 2024-08-12 NOTE — PROGRESS NOTES
Progress Note - [facility]   Ines Solares 39 y.o. female   :1985, MRN: [id number]  2024      Follow Up Evaluation / Problem:     Very severe obstructive sleep apnea      Thank you for the opportunity of participating in the evaluation and care of this patient in the Sleep Clinic at [facility].      HPI: Ines Solares is a 39 y.o. year old female.  The patient presents for follow up of very severe obstructive sleep apnea.  She was diagnosed with JEROME more than 8 years ago, but was unable to tolerated use of CPAP equipment.  She continued to have loud snoring, gasping and periods of apnea noted by her .  Results of past testing were not available.  A split study was ordered and completed in 2023, to re-evaluate and confirm JEROME, as well as for titration, due to past intolerance of CPAP equipment.  Very severe obstructive sleep apnea was observed in the pre-treatment phase with an apnea/hypopnea index of 111.5 events/hour.  Obstructive sleep apnea caused significant sleep fragmentation, early awakenings from sleep, and moderate to severe oxygen desaturations.  Obstructive sleep apnea had a significant supine component with supine AHI of 143.4 events/hour.   Oxygen marcie was 67%, however, the baseline oxygenation was normal.    PLMs were noted and she was experiencing restless leg symptoms, however symptoms improved after iron infusions.        Current Outpatient Medications:     albuterol (ProAir HFA) 90 mcg/act inhaler, Inhale 2 puffs every 6 (six) hours as needed for wheezing, Disp: 18 g, Rfl: 5    amoxicillin-clavulanate (AUGMENTIN) 875-125 mg per tablet, Take 1 tablet by mouth every 12 (twelve) hours for 7 days, Disp: 14 tablet, Rfl: 0    Diclofenac Sodium (VOLTAREN) 1 %, Apply 2 g topically 4 (four) times a day, Disp: 60 g, Rfl: 3    escitalopram (LEXAPRO) 20 mg tablet, Take 1 tablet (20 mg total) by mouth daily, Disp: 100  tablet, Rfl: 1    fluticasone (FLONASE) 50 mcg/act nasal spray, 2 sprays into each nostril daily, Disp: 48 g, Rfl: 3    hydrochlorothiazide (HYDRODIURIL) 25 mg tablet, Take 1 tablet (25 mg total) by mouth daily, Disp: 90 tablet, Rfl: 3    levocetirizine (XYZAL) 5 MG tablet, TAKE 1 TABLET EVERY EVENING, Disp: 90 tablet, Rfl: 3    lisinopril (ZESTRIL) 20 mg tablet, Take 1 tablet (20 mg total) by mouth daily, Disp: 100 tablet, Rfl: 1    LOW-DOSE ASPIRIN PO, , Disp: , Rfl:     metFORMIN (GLUCOPHAGE) 500 mg tablet, Take 1 tablet (500 mg total) by mouth 2 (two) times a day with meals, Disp: 200 tablet, Rfl: 1    montelukast (SINGULAIR) 10 mg tablet, Take 1 tablet (10 mg total) by mouth daily at bedtime, Disp: 90 tablet, Rfl: 3    omeprazole (PriLOSEC) 20 mg delayed release capsule, Take 1 capsule (20 mg total) by mouth daily, Disp: 100 capsule, Rfl: 1    RA Vitamin B-12 100 MCG tablet, TAKE 10 TABLETS BY MOUTH ONCE DAILY, Disp: 90 tablet, Rfl: 3    rosuvastatin (CRESTOR) 5 mg tablet, Take 1 tablet (5 mg total) by mouth daily, Disp: 100 tablet, Rfl: 1    Synthroid 200 MCG tablet, Take 1 tablet (200 mcg total) by mouth daily, Disp: 100 tablet, Rfl: 1    diclofenac sodium (VOLTAREN) 50 mg EC tablet, Take 1 tablet (50 mg total) by mouth 2 (two) times a day as needed (pain) (Patient not taking: Reported on 8/12/2024), Disp: 90 tablet, Rfl: 1    How likely are you to doze off or fall asleep in the following situations, in contrast to feeling just tired?  Sitting and reading: Slight chance of dozing  Watching TV: Slight chance of dozing  Sitting, inactive in a public place (e.g. a theatre or a meeting): Would never doze  As a passenger in a car for an hour without a break: Would never doze  Lying down to rest in the afternoon when circumstances permit: Moderate chance of dozing  Sitting and talking to someone: Would never doze  Sitting quietly after a lunch without alcohol: Would never doze  In a car, while stopped for a few  "minutes in traffic: Would never doze  Total score: 4              Vitals:    08/12/24 1513   BP: 140/76   BP Location: Left arm   Patient Position: Sitting   Cuff Size: Large   Pulse: 89   Temp: 98.3 °F (36.8 °C)   SpO2: 98%   Weight: 110 kg (243 lb 6.4 oz)   Height: 5' 6\" (1.676 m)       Body mass index is 39.29 kg/m².            EPWORTH SLEEPINESS SCORE  Total score: 4, improved from 14/24      Past History Since Last Sleep Center Visit:   She denies any significant changes to her health since her last visit.  She has been working on weight loss with healthy dietary changes and increase in exercise.  She has been using PAP equipment nightly.  She feels the current mask and settings are comfortable.  She reports sleeping more restfully and having less daytime sleepiness.  She is not taking naps anymore.  No drowsiness with driving.    She has hematology follow up tomorrow.  She is hoping she doesn't need any further iron infusion.  Restless leg symptoms have improved since infusions.  She still has some difficulty if she has had increased physical activity earlier in the day.  RLS symptoms also worsen when she has her menses.        The review of systems and following portions of the patient's history were reviewed and updated as appropriate: allergies, current medications, past family history, past medical history, past social history, past surgical history, and problem list.        OBJECTIVE  Equipment set up date:  10/16/23  PAP Pressure: Nasal AutoPAP using a lower limit of 10 cm and an upper limit of 15 cm of water pressure  Type of mask used: full face  DME Provider: [employer]    Physical Exam:     General Appearance:   Alert, cooperative, no distress, appears stated age, obese     Lungs:    Clear to auscultation bilaterally, respirations unlabored     Heart:   Regular rate and rhythm, S1 and S2 normal, no murmur, rub or gallop       ASSESSMENT / PLAN    1. Obstructive sleep apnea treated with continuous " positive airway pressure (CPAP)  PAP DME Resupply/Reorder                Counseling / Coordination of Care  I have spent a total time of 30 minutes on 08/12/24 in caring for this patient including Risks and benefits of tx options, Instructions for management, Patient and family education, Importance of tx compliance, Risk factor reductions, Impressions, Counseling / Coordination of care, Documenting in the medical record, and Reviewing / ordering tests, medicine, procedures  .      Today I reviewed the patient's compliance data.  she has been able to use the equipment 97% of all days recorded.  Average usage was 4 or more hours 90% of all days recorded.  The patient uses the equipment for an average of 5 hours and 22 minutes per night.  The estimated AHI is 1.3 abnormal breathing events per hour.  She is at goal for hours of use and effectiveness of treatment.  The patient feels they benefit from the use of PAP equipment and would like to continue PAP therapy.  Response to treatment has been excellent.  A prescription for supplies has been provided to last for the next year.  We discussed that she should use the CPAP equipment if she takes a nap as well.  She has restless leg symptoms at times, however, she reports that symptoms have improved since having the iron infusions.  She was encouraged to work on weight loss.      She will continue using this equipment at the settings noted above for the next 12 months.  At that timeshe will then return for a routine follow-up evaluation. I have asked the patient to contact the Sleep Disorders Center if she encounters any difficulties prior to that time.    The following instructions have been given to the patient today:    Patient Instructions   1.  Continue use of CPAP equipment nightly  2.  Continue to clean your equipment, as discussed  3.  Contact the Sleep Disorders Center with any questions or concerns prior to your next visit, as needed  4.  Schedule visit for  "follow-up in 1 year       Nursing Support:  When:  Monday through Friday 7:00am-5:00pm, except holidays  Where:  Direct phone line is [phone], option 3  If you are having a true emergency, please call 911.  In the event that the line is busy or it is after hours, please leave a voice mail message and we will return your call.  Please speak clearly, leaving your full name, birth date, best number to reach you and the reason for your call.  Medication refills:  We will need the name of the medication, the dosage, the ordering provider, whether you get a 30 day or 90 day refill and the pharmacy name and address.  Medications will be ordered by the providers only.  Nurses cannot call in prescriptions.    To reach the Sleep Disorders Center office staff:  Call [phone], option 1, followed by option 3    RACHELLE Gayle  [facility]      Portions of the record may have been created with voice recognition software. Occasional wrong word or \"sound a like\" substitutions may have occurred due to the inherent limitations of voice recognition software. Read the chart carefully and recognize, using context, where substitutions have occurred.       "

## 2024-08-12 NOTE — ASSESSMENT & PLAN NOTE
100% compliance with 93% for more than 4 hours    Current PAP Equipment:  Current type of equipment used:  CPAP - AirSense 11  Equipment set up date:  10/16/23  PAP Pressure:  AutoPAP using a lower limit of 10 cm and an upper limit of 15 cm of water pressure  Type of mask used: full face  DME Provider: [employer]    Current PAP Compliance Data:  Average usage:  She has been able to use the equipment 100% of all days recorded.  Average usage was 4 or more hours 93% of all days recorded.  Average hours used:  7 hours and 27 minutes  Air Leak:  occasional high leaks, attributes to restlessness   Average pressure:  12.9 cm of water pressure  Residual AHI:  1.0/hour, including 0.2OA, 0.2 CA, 0.3 hypopneas      Continue use of CPAP at current setting  Care and use of equipment reviewed  She is at goal for hours of use and effectiveness of treatment.  The patient feels they benefit from the use of PAP equipment and would like to continue PAP therapy.  Response to treatment has been excellent.  A prescription for supplies has been provided to last for the next year.  Follow up in one year

## 2024-08-12 NOTE — TELEPHONE ENCOUNTER
PA for Synthroid 200 MCG tablet  APPROVED     Date(s) approved: 08/12/2024 until 08/12/2025    Case # [id number]     Patient advised by          []MyChart Message  [x]Phone call   []LMOM  []L/M to call office as no active Communication consent on file  []Unable to leave detailed message as VM not approved on Communication consent       Pharmacy advised by    [x]Fax  []Phone call    Approval letter scanned into Media Yes

## 2024-08-12 NOTE — TELEPHONE ENCOUNTER
PA for Synthroid 200 MCG tablet SUBMITTED     via    []CMM-KEY   [x][employer]her"[employer]-Case ID # [id number]   []Faxed to plan   []Other website   []Phone call Case ID #     Office notes sent, clinical questions answered. Awaiting determination    Turnaround time for your insurance to make a decision on your Prior Authorization can take 7-21 business days.

## 2024-08-12 NOTE — PATIENT INSTRUCTIONS
1.  Continue use of CPAP equipment nightly  2.  Continue to clean your equipment, as discussed  3.  Contact the Sleep Disorders Center with any questions or concerns prior to your next visit, as needed  4.  Schedule visit for follow-up in 1 year       Nursing Support:  When:  Monday through Friday 7:00am-5:00pm, except holidays  Where:  Direct phone line is [phone], option 3  If you are having a true emergency, please call 911.  In the event that the line is busy or it is after hours, please leave a voice mail message and we will return your call.  Please speak clearly, leaving your full name, birth date, best number to reach you and the reason for your call.  Medication refills:  We will need the name of the medication, the dosage, the ordering provider, whether you get a 30 day or 90 day refill and the pharmacy name and address.  Medications will be ordered by the providers only.  Nurses cannot call in prescriptions.    To reach the [facility] office staff:  Call [phone], option 1, followed by option 3

## 2024-08-13 ENCOUNTER — OFFICE VISIT (OUTPATIENT)
Dept: HEMATOLOGY ONCOLOGY | Facility: CLINIC | Age: 39
End: 2024-08-13
Payer: COMMERCIAL

## 2024-08-13 ENCOUNTER — OFFICE VISIT (OUTPATIENT)
Dept: FAMILY MEDICINE CLINIC | Facility: CLINIC | Age: 39
End: 2024-08-13
Payer: COMMERCIAL

## 2024-08-13 VITALS
RESPIRATION RATE: 18 BRPM | SYSTOLIC BLOOD PRESSURE: 126 MMHG | DIASTOLIC BLOOD PRESSURE: 80 MMHG | HEIGHT: 66 IN | HEART RATE: 96 BPM | BODY MASS INDEX: 47.09 KG/M2 | WEIGHT: 293 LBS | OXYGEN SATURATION: 98 % | TEMPERATURE: 98.1 F

## 2024-08-13 VITALS
HEART RATE: 84 BPM | WEIGHT: 293 LBS | BODY MASS INDEX: 49.39 KG/M2 | SYSTOLIC BLOOD PRESSURE: 124 MMHG | TEMPERATURE: 97.8 F | DIASTOLIC BLOOD PRESSURE: 82 MMHG | OXYGEN SATURATION: 99 %

## 2024-08-13 DIAGNOSIS — G47.33 OBSTRUCTIVE SLEEP APNEA TREATED WITH CONTINUOUS POSITIVE AIRWAY PRESSURE (CPAP): ICD-10-CM

## 2024-08-13 DIAGNOSIS — E89.0 POSTABLATIVE HYPOTHYROIDISM: ICD-10-CM

## 2024-08-13 DIAGNOSIS — E11.9 TYPE 2 DIABETES MELLITUS WITHOUT COMPLICATION, WITHOUT LONG-TERM CURRENT USE OF INSULIN (HCC): Primary | ICD-10-CM

## 2024-08-13 DIAGNOSIS — B37.9 YEAST INFECTION: ICD-10-CM

## 2024-08-13 DIAGNOSIS — J35.1 LARGE TONSILS: ICD-10-CM

## 2024-08-13 DIAGNOSIS — D50.8 OTHER IRON DEFICIENCY ANEMIA: ICD-10-CM

## 2024-08-13 DIAGNOSIS — I10 HYPERTENSION, UNSPECIFIED TYPE: ICD-10-CM

## 2024-08-13 DIAGNOSIS — E53.8 B12 DEFICIENCY: Primary | ICD-10-CM

## 2024-08-13 PROCEDURE — 99214 OFFICE O/P EST MOD 30 MIN: CPT | Performed by: NURSE PRACTITIONER

## 2024-08-13 PROCEDURE — 99214 OFFICE O/P EST MOD 30 MIN: CPT | Performed by: INTERNAL MEDICINE

## 2024-08-13 RX ORDER — FLUCONAZOLE 150 MG/1
TABLET ORAL
Qty: 2 TABLET | Refills: 1 | Status: SHIPPED | OUTPATIENT
Start: 2024-08-13 | End: 2024-08-20

## 2024-08-13 RX ORDER — LISINOPRIL 20 MG/1
20 TABLET ORAL 2 TIMES DAILY
Qty: 200 TABLET | Refills: 1 | Status: SHIPPED | OUTPATIENT
Start: 2024-08-13

## 2024-08-13 NOTE — PROGRESS NOTES
Ambulatory Visit  Name: Ines Solares      : 1985      MRN: [id number]  Encounter Provider: RACHELLE Ospina  Encounter Date: 2024   Encounter department: [facility]    Assessment & Plan   1. Type 2 diabetes mellitus without complication, without long-term current use of insulin (HCC)  -     semaglutide, 0.25 or 0.5 mg/dose, (Ozempic, 0.25 or 0.5 MG/DOSE,) 2 mg/3 mL injection pen; Inject 0.75 mL (0.5 mg total) under the skin every 7 days  -     metFORMIN (GLUCOPHAGE) 500 mg tablet; Take 1 tablet (500 mg total) by mouth daily with breakfast  -     semaglutide, 0.25 or 0.5 mg/dose, (Ozempic, 0.25 or 0.5 MG/DOSE,) 2 mg/3 mL injection pen; 0.25 mg under the skin every 7 days for 4 doses (28 days), THEN 0.5 mg under the skin every 7 days  2. Hypertension, unspecified type  -     lisinopril (ZESTRIL) 20 mg tablet; Take 1 tablet (20 mg total) by mouth 2 (two) times a day  3. Yeast infection  -     fluconazole (DIFLUCAN) 150 mg tablet; 1 tablet today, 1 tablet in 1 week if needed  4. Obstructive sleep apnea treated with continuous positive airway pressure (CPAP)  -     Ambulatory Referral to Otolaryngology; Future  5. Large tonsils  -     Ambulatory Referral to Otolaryngology; Future  6. Postablative hypothyroidism         History of Present Illness     Here for [facility]-  HgA1c increased to 8.0, she is 306 pounds/struggling with weight loss, Metformin is causing GI side effects- discussed Ozempic, patient is agreeable, first dose given in office with sample- 0.25mg weekly- sample given with instructions, all questions answered.  She will continue Metformin 500mg daily until HgA1c is low enough to discontinue.   Reviewed recent labs- has appointment with Dr. Yeh/Hematology to day to discuss iron anemia.  Would like renewal of ENT referral for tonsillectomy as recommended by ENT previously- she has new insurance and is now ready    Hypothyroidism- TSH is therapeutic at 0.9, she  requires brand name Synthroid due to no response in TSH with generic Levothyroxine.     Will return in 8 weeks for medcheck, repeat HgA1c, and annual physical      Review of Systems   Constitutional:  Negative for activity change, diaphoresis, fatigue and fever.   HENT:  Positive for congestion (chronic). Negative for facial swelling, hearing loss, rhinorrhea, sinus pressure, sinus pain, sneezing, sore throat and voice change.    Eyes:  Negative for discharge and visual disturbance.   Respiratory:  Negative for cough, choking, chest tightness, shortness of breath, wheezing and stridor.    Cardiovascular:  Negative for chest pain, palpitations and leg swelling.   Gastrointestinal:  Negative for abdominal distention, abdominal pain, constipation, diarrhea, nausea and vomiting.   Endocrine: Negative for polydipsia, polyphagia and polyuria.   Genitourinary:  Negative for difficulty urinating, dysuria, frequency and urgency.   Musculoskeletal:  Negative for arthralgias, back pain, gait problem, joint swelling, myalgias, neck pain and neck stiffness.   Skin:  Negative for color change, rash and wound.   Neurological:  Negative for dizziness, syncope, speech difficulty, weakness, light-headedness and headaches.   Hematological:  Negative for adenopathy. Does not bruise/bleed easily.   Psychiatric/Behavioral:  Positive for sleep disturbance. Negative for agitation, behavioral problems, confusion, hallucinations and suicidal ideas. The patient is nervous/anxious (doing well on medication).      Past Medical History:   Diagnosis Date    Anxiety     Asthma     Difficulty using continuous positive airway pressure (CPAP) device 06/05/2023    Disease of thyroid gland     Excessive daytime sleepiness 06/05/2023    GERD (gastroesophageal reflux disease)     Hypertension     MRSA (methicillin resistant Staphylococcus aureus)     right leg & right leg inner thigh     Past Surgical History:   Procedure Laterality Date    BREAST BIOPSY  Right     benign     SECTION      x1 live birth     SECTION      LEG SURGERY Right 2015    removal of MRSA    THYROIDECTOMY  2015     Family History   Problem Relation Age of Onset    Cervical cancer Mother     Hypertension Mother     Ovarian cancer Mother     Hypertension Father     Heart disease Maternal Uncle     Breast cancer Paternal Grandmother         My dads mom  of breast cancer    No Known Problems Sister     No Known Problems Daughter     No Known Problems Maternal Grandmother     No Known Problems Maternal Grandfather     No Known Problems Paternal Grandfather     No Known Problems Sister     No Known Problems Daughter     No Known Problems Paternal Aunt     No Known Problems Paternal Aunt     No Known Problems Paternal Aunt     No Known Problems Paternal Aunt     No Known Problems Paternal Aunt     No Known Problems Paternal Aunt     No Known Problems Paternal Aunt      Social History     Tobacco Use    Smoking status: Never    Smokeless tobacco: Never   Vaping Use    Vaping status: Never Used   Substance and Sexual Activity    Alcohol use: Not Currently     Comment: socially    Drug use: Never    Sexual activity: Yes     Partners: Male     Birth control/protection: None     Current Outpatient Medications on File Prior to Visit   Medication Sig    albuterol (ProAir HFA) 90 mcg/act inhaler Inhale 2 puffs every 6 (six) hours as needed for wheezing    Diclofenac Sodium (VOLTAREN) 1 % Apply 2 g topically 4 (four) times a day    diclofenac sodium (VOLTAREN) 50 mg EC tablet Take 1 tablet (50 mg total) by mouth 2 (two) times a day as needed (pain)    escitalopram (LEXAPRO) 20 mg tablet Take 1 tablet (20 mg total) by mouth daily    fluticasone (FLONASE) 50 mcg/act nasal spray 2 sprays into each nostril daily    hydrochlorothiazide (HYDRODIURIL) 25 mg tablet Take 1 tablet (25 mg total) by mouth daily    levocetirizine (XYZAL) 5 MG tablet TAKE 1 TABLET EVERY EVENING    LOW-DOSE ASPIRIN PO      omeprazole (PriLOSEC) 20 mg delayed release capsule Take 1 capsule (20 mg total) by mouth daily    RA Vitamin B-12 100 MCG tablet TAKE 10 TABLETS BY MOUTH ONCE DAILY    rosuvastatin (CRESTOR) 5 mg tablet Take 1 tablet (5 mg total) by mouth daily    Synthroid 200 MCG tablet Take 1 tablet (200 mcg total) by mouth daily    [DISCONTINUED] lisinopril (ZESTRIL) 20 mg tablet Take 1 tablet (20 mg total) by mouth daily    [DISCONTINUED] metFORMIN (GLUCOPHAGE) 500 mg tablet Take 1 tablet (500 mg total) by mouth 2 (two) times a day with meals    [] amoxicillin-clavulanate (AUGMENTIN) 875-125 mg per tablet Take 1 tablet by mouth every 12 (twelve) hours for 7 days    montelukast (SINGULAIR) 10 mg tablet Take 1 tablet (10 mg total) by mouth daily at bedtime     Allergies   Allergen Reactions    Other      seasonal    Vancomycin      Immunization History   Administered Date(s) Administered    Hep B, adult 2008, 2008    INFLUENZA 2015, 10/14/2022    Influenza, injectable, quadrivalent, preservative free 0.5 mL 2020, 10/21/2021, 10/14/2022, 10/23/2023    Tdap 2008, 2021     Objective     /82   Pulse 84   Temp 97.8 °F (36.6 °C) (Tympanic)   Wt (!) 139 kg (306 lb)   SpO2 99%   BMI 49.39 kg/m²     Physical Exam  Vitals and nursing note reviewed.   Constitutional:       General: She is not in acute distress.     Appearance: She is well-developed. She is not diaphoretic.   Neck:      Thyroid: No thyromegaly.      Trachea: No tracheal deviation.   Cardiovascular:      Rate and Rhythm: Normal rate and regular rhythm.      Heart sounds: Normal heart sounds.   Pulmonary:      Effort: Pulmonary effort is normal. No respiratory distress.      Breath sounds: Normal breath sounds. No wheezing.   Musculoskeletal:         General: No tenderness or deformity. Normal range of motion.      Cervical back: Normal range of motion and neck supple.   Skin:     General: Skin is warm and dry.       Findings: No erythema or rash.   Neurological:      Mental Status: She is alert and oriented to person, place, and time.   Psychiatric:         Mood and Affect: Mood normal.         Behavior: Behavior normal. Behavior is cooperative.         Thought Content: Thought content normal.         Judgment: Judgment normal.

## 2024-08-13 NOTE — PROGRESS NOTES
Hematology/Oncology Outpatient Follow-up  Ines Solares 39 y.o. female 1985 [id number]    Date:  8/13/2024    Assessment and Plan:  1. B12 deficiency  Her vitamin B12 level is in the low normal range.  I did ask her to continue with vitamin B12 supplements daily and take extra vitamin B12 every now and then.  - CBC and differential; Future  - Comprehensive metabolic panel; Future  - Ferritin; Future  - Iron Panel (Includes Ferritin, Iron Sat%, Iron, and TIBC); Future  - C-reactive protein; Future  - Sedimentation rate, automated; Future  - Vitamin B12; Future    2. Other iron deficiency anemia  She does not seem to be anemic or iron deficient at this point.  I did encourage her to follow-up with her GYN team since she continues to have heavy menstrual bleeding.  I did also ask her to take oral vitamin B12 supplements every now and then.  We will see her again in about a year from now for close follow-up.  She was instructed to get in touch with us sooner if there is any need for that.  - CBC and differential; Future  - Comprehensive metabolic panel; Future  - Ferritin; Future  - Iron Panel (Includes Ferritin, Iron Sat%, Iron, and TIBC); Future  - C-reactive protein; Future  - Sedimentation rate, automated; Future  - Vitamin B12; Future      HPI:  Patient is a pleasant 39-year-old female who presents for further evaluation/treatment of her microcytic anemia/iron deficiency.  She has past medical history of diabetes, hyperlipidemia, GERD, asthma, JEROME, arthritis, hypertension, celiac disease and hypothyroidism status post thyroidectomy 2015 due to enlarged/nodular thyroid.  She mentions that she does have a history of iron deficiency and was treated with IV iron in the past last treatment was around 2015.  She did have upper endoscopy in the past.  Mentions that she has trialed oral iron in the past which was not effective and caused nausea/GI upset.     She is on PPI for her reflux but states that she continues to  have heartburn.  Denies bleeding from any site other than her menstrual cycles which have been rather heavy.  She states that her menses typically last 5 to 7 days the first 2 to 3 days are extremely heavy with clots where she is changing her pad/tampon every 1 to 1-1/2 hours.  She is scheduled to meet with a new gynecologist tomorrow.     She does report significant fatigue/exhaustion.  Reports occasional headaches and dizziness/lightheadedness however they tend to occur around the time of her menses.  Reports dyspnea with exertion.  Admits to frequent leg cramps, restless legs at hour of sleep and pica for ice.  Even mentions that she is particular about her ice and prefers the ice from [employer]          Interval history:  The patient came today for follow-up visit.  She stated that she continues to have heavy menstrual cycle.  She denied bleeding from any other sites.  Blood work on 8/6/2024 showed hemoglobin of 13.3 with normal platelet count and high normal white cell count with normal white cell differential.  CMP was normal with high fasting glucose level.  C-reactive protein and sed rate are elevated.  Vitamin B12 341.  Iron panel showed saturation of 20% and ferritin of 74.  ROS: Review of Systems   Constitutional:  Positive for fatigue. Negative for chills and fever.   HENT:  Positive for mouth sores. Negative for ear pain and sore throat.    Eyes:  Negative for pain and visual disturbance.   Respiratory:  Positive for shortness of breath. Negative for cough.    Cardiovascular:  Negative for chest pain and palpitations.   Gastrointestinal:  Negative for abdominal pain and vomiting.   Genitourinary:  Negative for dysuria and hematuria.   Musculoskeletal:  Negative for arthralgias and back pain.   Skin:  Negative for color change and rash.   Neurological:  Positive for headaches. Negative for seizures and syncope.   Psychiatric/Behavioral:  Positive for sleep disturbance.    All other systems reviewed  and are negative.      Past Medical History:   Diagnosis Date    Anxiety     Asthma     Difficulty using continuous positive airway pressure (CPAP) device 2023    Disease of thyroid gland     Excessive daytime sleepiness 2023    GERD (gastroesophageal reflux disease)     Hypertension     MRSA (methicillin resistant Staphylococcus aureus)     right leg & right leg inner thigh       Past Surgical History:   Procedure Laterality Date    BREAST BIOPSY Right     benign     SECTION      x1 live birth     SECTION      LEG SURGERY Right 2015    removal of MRSA    THYROIDECTOMY  2015       Social History     Socioeconomic History    Marital status: /Civil Union     Spouse name: None    Number of children: 2    Years of education: None    Highest education level: None   Occupational History    Occupation: employed   Tobacco Use    Smoking status: Never    Smokeless tobacco: Never   Vaping Use    Vaping status: Never Used   Substance and Sexual Activity    Alcohol use: Not Currently     Comment: socially    Drug use: Never    Sexual activity: Yes     Partners: Male     Birth control/protection: None   Other Topics Concern    None   Social History Narrative    Daily Caffeine Use- 2 cups of soda and coffee/hot tea on occasion     Social Determinants of Health     Financial Resource Strain: Not on file   Food Insecurity: Not on file   Transportation Needs: Not on file   Physical Activity: Not on file   Stress: Not on file   Social Connections: Unknown (2024)    Received from [employer]    Social [employer]     How often do you feel lonely or isolated from those around you? (Adult - for ages 18 years and over): Not on file   Intimate Partner Violence: Not on file   Housing Stability: Not on file       Family History   Problem Relation Age of Onset    Cervical cancer Mother     Hypertension Mother     Ovarian cancer Mother     Hypertension Father     Heart disease Maternal Uncle     Breast  cancer Paternal Grandmother         My dads mom  of breast cancer    No Known Problems Sister     No Known Problems Daughter     No Known Problems Maternal Grandmother     No Known Problems Maternal Grandfather     No Known Problems Paternal Grandfather     No Known Problems Sister     No Known Problems Daughter     No Known Problems Paternal Aunt     No Known Problems Paternal Aunt     No Known Problems Paternal Aunt     No Known Problems Paternal Aunt     No Known Problems Paternal Aunt     No Known Problems Paternal Aunt     No Known Problems Paternal Aunt        Allergies   Allergen Reactions    Other      seasonal    Vancomycin          Current Outpatient Medications:     albuterol (ProAir HFA) 90 mcg/act inhaler, Inhale 2 puffs every 6 (six) hours as needed for wheezing, Disp: 18 g, Rfl: 5    Diclofenac Sodium (VOLTAREN) 1 %, Apply 2 g topically 4 (four) times a day, Disp: 60 g, Rfl: 3    diclofenac sodium (VOLTAREN) 50 mg EC tablet, Take 1 tablet (50 mg total) by mouth 2 (two) times a day as needed (pain), Disp: 90 tablet, Rfl: 1    escitalopram (LEXAPRO) 20 mg tablet, Take 1 tablet (20 mg total) by mouth daily, Disp: 100 tablet, Rfl: 1    fluconazole (DIFLUCAN) 150 mg tablet, 1 tablet today, 1 tablet in 1 week if needed, Disp: 2 tablet, Rfl: 1    fluticasone (FLONASE) 50 mcg/act nasal spray, 2 sprays into each nostril daily, Disp: 48 g, Rfl: 3    hydrochlorothiazide (HYDRODIURIL) 25 mg tablet, Take 1 tablet (25 mg total) by mouth daily, Disp: 90 tablet, Rfl: 3    levocetirizine (XYZAL) 5 MG tablet, TAKE 1 TABLET EVERY EVENING, Disp: 90 tablet, Rfl: 3    lisinopril (ZESTRIL) 20 mg tablet, Take 1 tablet (20 mg total) by mouth 2 (two) times a day, Disp: 200 tablet, Rfl: 1    LOW-DOSE ASPIRIN PO, , Disp: , Rfl:     metFORMIN (GLUCOPHAGE) 500 mg tablet, Take 1 tablet (500 mg total) by mouth daily with breakfast, Disp: 100 tablet, Rfl: 1    montelukast (SINGULAIR) 10 mg tablet, Take 1 tablet (10 mg total) by  "mouth daily at bedtime, Disp: 90 tablet, Rfl: 3    omeprazole (PriLOSEC) 20 mg delayed release capsule, Take 1 capsule (20 mg total) by mouth daily, Disp: 100 capsule, Rfl: 1    RA Vitamin B-12 100 MCG tablet, TAKE 10 TABLETS BY MOUTH ONCE DAILY, Disp: 90 tablet, Rfl: 3    rosuvastatin (CRESTOR) 5 mg tablet, Take 1 tablet (5 mg total) by mouth daily, Disp: 100 tablet, Rfl: 1    semaglutide, 0.25 or 0.5 mg/dose, (Ozempic, 0.25 or 0.5 MG/DOSE,) 2 mg/3 mL injection pen, Inject 0.75 mL (0.5 mg total) under the skin every 7 days, Disp: 3 mL, Rfl: 1    semaglutide, 0.25 or 0.5 mg/dose, (Ozempic, 0.25 or 0.5 MG/DOSE,) 2 mg/3 mL injection pen, 0.25 mg under the skin every 7 days for 4 doses (28 days), THEN 0.5 mg under the skin every 7 days, Disp: 6 mL, Rfl: 0    Synthroid 200 MCG tablet, Take 1 tablet (200 mcg total) by mouth daily, Disp: 100 tablet, Rfl: 1      Physical Exam:  /80 (BP Location: Right arm, Patient Position: Sitting, Cuff Size: Adult)   Pulse 96   Temp 98.1 °F (36.7 °C)   Resp 18   Ht 5' 6\" (1.676 m)   Wt (!) 139 kg (306 lb)   SpO2 98%   BMI 49.39 kg/m²     Physical Exam  Constitutional:       General: She is not in acute distress.     Appearance: She is well-developed. She is obese. She is not diaphoretic.   HENT:      Head: Normocephalic and atraumatic.      Nose: Nose normal.   Eyes:      General: No scleral icterus.        Right eye: No discharge.         Left eye: No discharge.      Conjunctiva/sclera: Conjunctivae normal.      Pupils: Pupils are equal, round, and reactive to light.   Neck:      Thyroid: No thyromegaly.      Vascular: No JVD.      Trachea: No tracheal deviation.   Cardiovascular:      Rate and Rhythm: Normal rate and regular rhythm.      Heart sounds: Normal heart sounds. No murmur heard.     No friction rub.   Pulmonary:      Effort: Pulmonary effort is normal. No respiratory distress.      Breath sounds: Normal breath sounds. No stridor. No wheezing or rales.   Chest:    "   Chest wall: No tenderness.   Abdominal:      General: There is no distension.      Palpations: Abdomen is soft. There is no hepatomegaly or splenomegaly.      Tenderness: There is no abdominal tenderness. There is no guarding or rebound.   Musculoskeletal:         General: No tenderness or deformity. Normal range of motion.      Cervical back: Normal range of motion and neck supple.   Lymphadenopathy:      Cervical: No cervical adenopathy.   Skin:     General: Skin is warm and dry.      Coloration: Skin is not pale.      Findings: No erythema or rash.   Neurological:      Mental Status: She is alert and oriented to person, place, and time.      Cranial Nerves: No cranial nerve deficit.      Coordination: Coordination normal.      Deep Tendon Reflexes: Reflexes are normal and symmetric.   Psychiatric:         Behavior: Behavior normal.         Thought Content: Thought content normal.         Judgment: Judgment normal.           Labs:  Lab Results   Component Value Date    WBC 9.70 08/06/2024    HGB 13.3 08/06/2024    HCT 42.9 08/06/2024    MCV 85 08/06/2024     08/06/2024     Lab Results   Component Value Date     04/23/2018    K 3.8 08/06/2024    CL 99 08/06/2024    CO2 24 08/06/2024    ANIONGAP 12.1 04/23/2018    BUN 14 08/06/2024    CREATININE 0.75 08/06/2024    GLUF 287 (H) 08/06/2024    CALCIUM 9.3 08/06/2024    CORRECTEDCA 9.0 04/03/2023    AST 10 (L) 08/06/2024    ALT 11 08/06/2024    ALKPHOS 87 08/06/2024    PROT 8.2 04/23/2018    BILITOT 0.5 04/23/2018    EGFR 99 08/06/2024       Patient voiced understanding and agreement in the above discussion. Aware to contact our office with questions/symptoms in the interim.

## 2024-08-13 NOTE — PROGRESS NOTES
Adult Annual Physical  Name: Ines Solares      : 1985      MRN: [id number]  Encounter Provider: RACHELLE Ospina  Encounter Date: 2024   Encounter department: [facility] PRIMARY CARE    Assessment & Plan   1. Type 2 diabetes mellitus without complication, without long-term current use of insulin (HCC)  -     semaglutide, 0.25 or 0.5 mg/dose, (Ozempic, 0.25 or 0.5 MG/DOSE,) 2 mg/3 mL injection pen; Inject 0.75 mL (0.5 mg total) under the skin every 7 days  -     metFORMIN (GLUCOPHAGE) 500 mg tablet; Take 1 tablet (500 mg total) by mouth daily with breakfast  2. Hypertension, unspecified type  -     lisinopril (ZESTRIL) 20 mg tablet; Take 1 tablet (20 mg total) by mouth 2 (two) times a day  3. Yeast infection  -     fluconazole (DIFLUCAN) 150 mg tablet; 1 tablet today, 1 tablet in 1 week if needed  4. Obstructive sleep apnea treated with continuous positive airway pressure (CPAP)  -     Ambulatory Referral to Otolaryngology; Future  5. Large tonsils  -     Ambulatory Referral to Otolaryngology; Future    Immunizations and preventive care screenings were discussed with patient today. Appropriate education was printed on patient's after visit summary.    Counseling:  {Annual Physical; Counselin}         History of Present Illness   {Disappearing Hyperlinks I Encounters * My Last Note * Since Last Visit * History :70979}  Adult Annual Physical  Review of Systems  {Select to Display PM (Optional):57190}    Objective   {Disappearing Hyperlinks   Review Vitals * Enter New Vitals * Results Review * Labs * Imaging * Cardiology * Procedures * Lung Cancer Screening :92768}  /82   Pulse 84   Temp 97.8 °F (36.6 °C) (Tympanic)   Wt (!) 139 kg (306 lb)   SpO2 99%   BMI 49.39 kg/m²     Physical Exam  {Administrative / Billing Section (Optional):77452}

## 2024-08-14 ENCOUNTER — TELEPHONE (OUTPATIENT)
Dept: SLEEP CENTER | Facility: CLINIC | Age: 39
End: 2024-08-14

## 2024-08-15 LAB

## 2024-08-16 LAB
APOB+LDLR+PCSK9 GENE MUT ANL BLD/T: NOT DETECTED
BRCA1+BRCA2 DEL+DUP + FULL MUT ANL BLD/T: NOT DETECTED
MLH1+MSH2+MSH6+PMS2 GN DEL+DUP+FUL M: NOT DETECTED

## 2024-08-21 ENCOUNTER — TELEPHONE (OUTPATIENT)
Dept: DENTISTRY | Facility: CLINIC | Age: 39
End: 2024-08-21

## 2024-08-21 NOTE — TELEPHONE ENCOUNTER
attempted to reach pt 8/21 to offer emerg dental appt 8/22 - [facility]    [place]

## 2024-08-22 ENCOUNTER — HOSPITAL ENCOUNTER (OUTPATIENT)
Dept: MAMMOGRAPHY | Facility: HOSPITAL | Age: 39
End: 2024-08-22
Payer: COMMERCIAL

## 2024-08-22 VITALS — BODY MASS INDEX: 47.09 KG/M2 | HEIGHT: 66 IN | WEIGHT: 293 LBS

## 2024-08-22 DIAGNOSIS — Z12.31 ENCOUNTER FOR SCREENING MAMMOGRAM FOR MALIGNANT NEOPLASM OF BREAST: ICD-10-CM

## 2024-08-22 PROCEDURE — 77067 SCR MAMMO BI INCL CAD: CPT

## 2024-08-22 PROCEDURE — 77063 BREAST TOMOSYNTHESIS BI: CPT

## 2024-08-26 DIAGNOSIS — E11.9 TYPE 2 DIABETES MELLITUS WITHOUT COMPLICATION, WITHOUT LONG-TERM CURRENT USE OF INSULIN (HCC): ICD-10-CM

## 2024-08-27 ENCOUNTER — TELEPHONE (OUTPATIENT)
Age: 39
End: 2024-08-27

## 2024-08-27 NOTE — TELEPHONE ENCOUNTER
PA for Ozempic 0.25 or 0.5 MG/DOSE 2 mg/3 mL SUBMITTED     via    []CMM-KEY:   [x]Racheal-Case ID # [id number]   []Faxed to plan   []Other website   []Phone call Case ID #     Office notes sent, clinical questions answered. Awaiting determination    Turnaround time for your insurance to make a decision on your Prior Authorization can take 7-21 business days.

## 2024-08-28 NOTE — TELEPHONE ENCOUNTER
PA for Ozempic 0.25 or 0.5 MG/DOSE 2 mg/3 mL  APPROVED     Date(s) approved 8/27/2024-8/27/2025    Case #    Patient advised by          [x]MyChart Message  []Phone call   []LMOM  []L/M to call office as no active Communication consent on file  []Unable to leave detailed message as VM not approved on Communication consent       Pharmacy advised by    [x]Fax  []Phone call    Approval letter scanned into [employer] Yes

## 2024-09-06 DIAGNOSIS — E11.9 TYPE 2 DIABETES MELLITUS WITHOUT COMPLICATION, WITHOUT LONG-TERM CURRENT USE OF INSULIN (HCC): Primary | ICD-10-CM

## 2024-09-09 DIAGNOSIS — B34.9 ACUTE VIRAL SYNDROME: ICD-10-CM

## 2024-09-09 DIAGNOSIS — M25.562 CHRONIC PAIN OF LEFT KNEE: ICD-10-CM

## 2024-09-09 DIAGNOSIS — F41.9 ANXIETY: ICD-10-CM

## 2024-09-09 DIAGNOSIS — J30.9 ALLERGIC RHINITIS WITH POSTNASAL DRIP: ICD-10-CM

## 2024-09-09 DIAGNOSIS — M19.079 ARTHRITIS OF MIDFOOT: ICD-10-CM

## 2024-09-09 DIAGNOSIS — G89.29 CHRONIC PAIN OF LEFT KNEE: ICD-10-CM

## 2024-09-09 DIAGNOSIS — R05.1 ACUTE COUGH: ICD-10-CM

## 2024-09-09 DIAGNOSIS — E53.8 B12 DEFICIENCY: ICD-10-CM

## 2024-09-09 DIAGNOSIS — E03.9 HYPOTHYROIDISM, UNSPECIFIED TYPE: ICD-10-CM

## 2024-09-09 DIAGNOSIS — M77.31 CALCANEAL SPUR OF RIGHT FOOT: ICD-10-CM

## 2024-09-09 DIAGNOSIS — E78.5 HYPERLIPIDEMIA LDL GOAL <70: ICD-10-CM

## 2024-09-09 DIAGNOSIS — E11.9 TYPE 2 DIABETES MELLITUS WITHOUT COMPLICATION, WITHOUT LONG-TERM CURRENT USE OF INSULIN (HCC): ICD-10-CM

## 2024-09-09 DIAGNOSIS — J45.20 MILD INTERMITTENT ASTHMA WITHOUT COMPLICATION: ICD-10-CM

## 2024-09-09 DIAGNOSIS — M79.671 RIGHT FOOT PAIN: ICD-10-CM

## 2024-09-09 DIAGNOSIS — I10 PRIMARY HYPERTENSION: ICD-10-CM

## 2024-09-09 DIAGNOSIS — R09.82 ALLERGIC RHINITIS WITH POSTNASAL DRIP: ICD-10-CM

## 2024-09-09 DIAGNOSIS — K21.00 GASTROESOPHAGEAL REFLUX DISEASE WITH ESOPHAGITIS WITHOUT HEMORRHAGE: ICD-10-CM

## 2024-09-09 DIAGNOSIS — I10 HYPERTENSION, UNSPECIFIED TYPE: ICD-10-CM

## 2024-09-09 DIAGNOSIS — Q66.229 METATARSUS ADDUCTUS: ICD-10-CM

## 2024-09-09 DIAGNOSIS — J30.1 SEASONAL ALLERGIC RHINITIS DUE TO POLLEN: ICD-10-CM

## 2024-09-09 RX ORDER — LEVOTHYROXINE SODIUM 200 MCG
200 TABLET ORAL DAILY
Qty: 100 TABLET | Refills: 1 | Status: SHIPPED | OUTPATIENT
Start: 2024-09-09

## 2024-09-09 RX ORDER — FLUTICASONE PROPIONATE 50 MCG
2 SPRAY, SUSPENSION (ML) NASAL DAILY
Qty: 48 G | Refills: 3 | Status: SHIPPED | OUTPATIENT
Start: 2024-09-09

## 2024-09-09 RX ORDER — ESCITALOPRAM OXALATE 20 MG/1
20 TABLET ORAL DAILY
Qty: 100 TABLET | Refills: 1 | Status: SHIPPED | OUTPATIENT
Start: 2024-09-09

## 2024-09-09 RX ORDER — HYDROCHLOROTHIAZIDE 25 MG/1
25 TABLET ORAL DAILY
Qty: 90 TABLET | Refills: 3 | Status: SHIPPED | OUTPATIENT
Start: 2024-09-09

## 2024-09-09 RX ORDER — MONTELUKAST SODIUM 10 MG/1
10 TABLET ORAL
Qty: 90 TABLET | Refills: 3 | Status: SHIPPED | OUTPATIENT
Start: 2024-09-09

## 2024-09-09 RX ORDER — ALBUTEROL SULFATE 90 UG/1
2 AEROSOL, METERED RESPIRATORY (INHALATION) EVERY 6 HOURS PRN
Qty: 18 G | Refills: 5 | Status: SHIPPED | OUTPATIENT
Start: 2024-09-09

## 2024-09-09 RX ORDER — LISINOPRIL 20 MG/1
20 TABLET ORAL 2 TIMES DAILY
Qty: 200 TABLET | Refills: 1 | Status: SHIPPED | OUTPATIENT
Start: 2024-09-09

## 2024-09-09 RX ORDER — ROSUVASTATIN CALCIUM 5 MG/1
5 TABLET, COATED ORAL DAILY
Qty: 100 TABLET | Refills: 1 | Status: SHIPPED | OUTPATIENT
Start: 2024-09-09

## 2024-09-09 RX ORDER — LEVOCETIRIZINE DIHYDROCHLORIDE 5 MG/1
5 TABLET, FILM COATED ORAL EVERY EVENING
Qty: 90 TABLET | Refills: 3 | Status: SHIPPED | OUTPATIENT
Start: 2024-09-09

## 2024-09-16 ENCOUNTER — TELEPHONE (OUTPATIENT)
Age: 39
End: 2024-09-16

## 2024-09-16 NOTE — TELEPHONE ENCOUNTER
PA for lisinopril (ZESTRIL) 20 mg  SUBMITTED     via    [x]CMM-KEY: [id number]  []Surescripts-Case ID #   []Faxed to plan   []Other website   []Phone call Case ID #     Office notes sent, clinical questions answered. Awaiting determination    Turnaround time for your insurance to make a decision on your Prior Authorization can take 7-21 business days.

## 2024-10-18 ENCOUNTER — OFFICE VISIT (OUTPATIENT)
Dept: FAMILY MEDICINE CLINIC | Facility: CLINIC | Age: 39
End: 2024-10-18
Payer: COMMERCIAL

## 2024-10-18 VITALS
TEMPERATURE: 98.2 F | SYSTOLIC BLOOD PRESSURE: 130 MMHG | BODY MASS INDEX: 47.09 KG/M2 | DIASTOLIC BLOOD PRESSURE: 82 MMHG | HEART RATE: 75 BPM | WEIGHT: 293 LBS | RESPIRATION RATE: 18 BRPM | OXYGEN SATURATION: 98 % | HEIGHT: 66 IN

## 2024-10-18 DIAGNOSIS — Z00.00 ANNUAL PHYSICAL EXAM: Primary | ICD-10-CM

## 2024-10-18 DIAGNOSIS — E03.9 HYPOTHYROIDISM, UNSPECIFIED TYPE: ICD-10-CM

## 2024-10-18 DIAGNOSIS — E78.5 HYPERLIPIDEMIA LDL GOAL <70: ICD-10-CM

## 2024-10-18 DIAGNOSIS — I10 PRIMARY HYPERTENSION: ICD-10-CM

## 2024-10-18 DIAGNOSIS — Z23 ENCOUNTER FOR IMMUNIZATION: ICD-10-CM

## 2024-10-18 DIAGNOSIS — E11.9 TYPE 2 DIABETES MELLITUS WITHOUT COMPLICATION, WITHOUT LONG-TERM CURRENT USE OF INSULIN (HCC): ICD-10-CM

## 2024-10-18 DIAGNOSIS — K21.00 GASTROESOPHAGEAL REFLUX DISEASE WITH ESOPHAGITIS WITHOUT HEMORRHAGE: ICD-10-CM

## 2024-10-18 LAB — SL AMB POCT HEMOGLOBIN AIC: 6.4 (ref ?–6.5)

## 2024-10-18 PROCEDURE — 99214 OFFICE O/P EST MOD 30 MIN: CPT | Performed by: NURSE PRACTITIONER

## 2024-10-18 PROCEDURE — 90656 IIV3 VACC NO PRSV 0.5 ML IM: CPT

## 2024-10-18 PROCEDURE — 90471 IMMUNIZATION ADMIN: CPT | Performed by: NURSE PRACTITIONER

## 2024-10-18 PROCEDURE — 83036 HEMOGLOBIN GLYCOSYLATED A1C: CPT | Performed by: NURSE PRACTITIONER

## 2024-10-18 PROCEDURE — 99396 PREV VISIT EST AGE 40-64: CPT | Performed by: NURSE PRACTITIONER

## 2024-10-18 PROCEDURE — 90656 IIV3 VACC NO PRSV 0.5 ML IM: CPT | Performed by: NURSE PRACTITIONER

## 2024-10-18 PROCEDURE — 90471 IMMUNIZATION ADMIN: CPT

## 2024-10-18 RX ORDER — FAMOTIDINE 20 MG/1
20 TABLET, FILM COATED ORAL 2 TIMES DAILY
Qty: 180 TABLET | Refills: 3 | Status: SHIPPED | OUTPATIENT
Start: 2024-10-18 | End: 2025-10-13

## 2024-10-18 NOTE — ASSESSMENT & PLAN NOTE
Continue Omeprazole daily, add Famotidine twice daily  Orders:    famotidine (PEPCID) 20 mg tablet; Take 1 tablet (20 mg total) by mouth 2 (two) times a day

## 2024-10-18 NOTE — PROGRESS NOTES
Adult Annual Physical  Name: Ines Solares      : 1985      MRN: [id number]  Encounter Provider: RACHELLE Ospina  Encounter Date: 10/18/2024   Encounter department: [facility] PRIMARY CARE    Assessment & Plan  Encounter for immunization    Orders:    influenza vaccine preservative-free 0.5 mL IM (Fluzone, Afluria, Fluarix, Flulaval)    Type 2 diabetes mellitus without complication, without long-term current use of insulin (HCC)  Stop Metformin daily, increase Ozempic to 2mg weekly    Lab Results   Component Value Date    HGBA1C 6.4 10/18/2024       Orders:    POCT hemoglobin A1c    semaglutide, 2 mg/dose, (Ozempic) 8 mg/ mL injection pen; Inject 0.75 mL (2 mg total) under the skin every 7 days    Hemoglobin A1C; Future    Comprehensive metabolic panel; Future    CBC and differential; Future    Albumin / creatinine urine ratio; Future    Hyperlipidemia LDL goal <70    Orders:    Lipid Panel with Direct LDL reflex; Future    Hypothyroidism, unspecified type    Orders:    TSH, 3rd generation with Free T4 reflex; Future    Gastroesophageal reflux disease with esophagitis without hemorrhage  Continue Omeprazole daily, add Famotidine twice daily  Orders:    famotidine (PEPCID) 20 mg tablet; Take 1 tablet (20 mg total) by mouth 2 (two) times a day    Annual physical exam         Primary hypertension         Immunizations and preventive care screenings were discussed with patient today. Appropriate education was printed on patient's after visit summary.             History of Present Illness     Adult Annual Physical:  Patient presents for annual physical. Here for annual physical and medcheck-  Has been on Ozempic at 1mg -  doing well other than about 24 hours of GI symptoms after injection, is agreeable to increase to 2mg weekly. HgA1c today is improved from 8.0 to 6.4. Is taking Metformin 500mg daily- is willing to stop this based on her better HgA1c and increase in Ozempic. .     Depression  "Screening:  - PHQ-2 Score: 0    Review of Systems   Constitutional:  Negative for activity change, diaphoresis, fatigue and fever.   HENT:  Negative for congestion, facial swelling, hearing loss, rhinorrhea, sinus pressure, sinus pain, sneezing, sore throat and voice change.    Eyes:  Negative for discharge and visual disturbance.   Respiratory:  Negative for cough, choking, chest tightness, shortness of breath, wheezing and stridor.    Cardiovascular:  Negative for chest pain, palpitations and leg swelling.   Gastrointestinal:  Negative for abdominal distention, abdominal pain, constipation, diarrhea, nausea and vomiting.   Endocrine: Negative for polydipsia, polyphagia and polyuria.   Genitourinary:  Negative for difficulty urinating, dysuria, frequency and urgency.   Musculoskeletal:  Negative for arthralgias, back pain, gait problem, joint swelling, myalgias, neck pain and neck stiffness.   Skin:  Negative for color change, rash and wound.   Neurological:  Negative for dizziness, syncope, speech difficulty, weakness, light-headedness and headaches.   Hematological:  Negative for adenopathy. Does not bruise/bleed easily.   Psychiatric/Behavioral:  Negative for agitation, behavioral problems, confusion, hallucinations, sleep disturbance and suicidal ideas. The patient is not nervous/anxious.      Medical History Reviewed by provider this encounter:         Objective     /82   Pulse 75   Temp 98.2 °F (36.8 °C)   Resp 18   Ht 5' 6\" (1.676 m)   Wt 136 kg (299 lb)   SpO2 98%   BMI 48.26 kg/m²     Physical Exam  Vitals and nursing note reviewed. Exam conducted with a chaperone present (daughter).   Constitutional:       General: She is not in acute distress.     Appearance: She is well-developed. She is not diaphoretic.   HENT:      Head: Normocephalic and atraumatic.      Right Ear: Tympanic membrane, ear canal and external ear normal.      Left Ear: Tympanic membrane, ear canal and external ear normal. "      Nose: Nose normal.      Mouth/Throat:      Mouth: Mucous membranes are moist.      Pharynx: Oropharynx is clear. Uvula midline.   Eyes:      General:         Right eye: No discharge.         Left eye: No discharge.      Conjunctiva/sclera: Conjunctivae normal.   Neck:      Thyroid: No thyromegaly.   Cardiovascular:      Rate and Rhythm: Normal rate and regular rhythm.      Heart sounds: Normal heart sounds.      No friction rub. No gallop.   Pulmonary:      Effort: Pulmonary effort is normal. No respiratory distress.      Breath sounds: Normal breath sounds. No wheezing or rales.   Musculoskeletal:         General: No tenderness or deformity. Normal range of motion.      Cervical back: Normal range of motion and neck supple.   Skin:     General: Skin is warm and dry.      Findings: No erythema or rash.   Neurological:      Mental Status: She is alert and oriented to person, place, and time.      Cranial Nerves: No cranial nerve deficit.      Coordination: Coordination normal.   Psychiatric:         Mood and Affect: Mood normal.         Behavior: Behavior normal.         Thought Content: Thought content normal.         Judgment: Judgment normal.

## 2024-10-18 NOTE — ASSESSMENT & PLAN NOTE
Stop Metformin daily, increase Ozempic to 2mg weekly    Lab Results   Component Value Date    HGBA1C 6.4 10/18/2024       Orders:    POCT hemoglobin A1c    semaglutide, 2 mg/dose, (Ozempic) 8 mg/ mL injection pen; Inject 0.75 mL (2 mg total) under the skin every 7 days    Hemoglobin A1C; Future    Comprehensive metabolic panel; Future    CBC and differential; Future    Albumin / creatinine urine ratio; Future

## 2024-10-30 DIAGNOSIS — K04.7 DENTAL ABSCESS: Primary | ICD-10-CM

## 2024-10-30 RX ORDER — AMOXICILLIN 500 MG/1
500 CAPSULE ORAL EVERY 8 HOURS SCHEDULED
Qty: 30 CAPSULE | Refills: 0 | Status: SHIPPED | OUTPATIENT
Start: 2024-10-30 | End: 2024-11-09

## 2024-10-31 ENCOUNTER — HOSPITAL ENCOUNTER (EMERGENCY)
Facility: HOSPITAL | Age: 39
Discharge: HOME | End: 2024-11-01
Attending: EMERGENCY MEDICINE
Payer: COMMERCIAL

## 2024-10-31 VITALS
TEMPERATURE: 98.7 F | BODY MASS INDEX: 47.09 KG/M2 | SYSTOLIC BLOOD PRESSURE: 179 MMHG | HEIGHT: 66 IN | HEART RATE: 103 BPM | DIASTOLIC BLOOD PRESSURE: 77 MMHG | RESPIRATION RATE: 16 BRPM | WEIGHT: 293 LBS | OXYGEN SATURATION: 96 %

## 2024-10-31 DIAGNOSIS — K08.89 PAIN, DENTAL: Primary | ICD-10-CM

## 2024-10-31 DIAGNOSIS — K04.7 DENTAL INFECTION: ICD-10-CM

## 2024-10-31 LAB
BASOPHILS # BLD AUTO: 0.08 THOUSANDS/ΜL (ref 0–0.1)
BASOPHILS NFR BLD AUTO: 1 % (ref 0–1)
EOSINOPHIL # BLD AUTO: 0.22 THOUSAND/ΜL (ref 0–0.61)
EOSINOPHIL NFR BLD AUTO: 2 % (ref 0–6)
ERYTHROCYTE [DISTWIDTH] IN BLOOD BY AUTOMATED COUNT: 15 % (ref 11.6–15.1)
HCT VFR BLD AUTO: 40.4 % (ref 34.8–46.1)
HGB BLD-MCNC: 12.5 G/DL (ref 11.5–15.4)
IMM GRANULOCYTES # BLD AUTO: 0.04 THOUSAND/UL (ref 0–0.2)
IMM GRANULOCYTES NFR BLD AUTO: 0 % (ref 0–2)
LYMPHOCYTES # BLD AUTO: 2.26 THOUSANDS/ΜL (ref 0.6–4.47)
LYMPHOCYTES NFR BLD AUTO: 18 % (ref 14–44)
MCH RBC QN AUTO: 26.1 PG (ref 26.8–34.3)
MCHC RBC AUTO-ENTMCNC: 30.9 G/DL (ref 31.4–37.4)
MCV RBC AUTO: 84 FL (ref 82–98)
MONOCYTES # BLD AUTO: 1.28 THOUSAND/ΜL (ref 0.17–1.22)
MONOCYTES NFR BLD AUTO: 10 % (ref 4–12)
NEUTROPHILS # BLD AUTO: 8.57 THOUSANDS/ΜL (ref 1.85–7.62)
NEUTS SEG NFR BLD AUTO: 69 % (ref 43–75)
NRBC BLD AUTO-RTO: 0 /100 WBCS
PLATELET # BLD AUTO: 326 THOUSANDS/UL (ref 149–390)
PMV BLD AUTO: 9.1 FL (ref 8.9–12.7)
RBC # BLD AUTO: 4.79 MILLION/UL (ref 3.81–5.12)
WBC # BLD AUTO: 12.45 THOUSAND/UL (ref 4.31–10.16)

## 2024-10-31 PROCEDURE — 99285 EMERGENCY DEPT VISIT HI MDM: CPT | Performed by: EMERGENCY MEDICINE

## 2024-10-31 PROCEDURE — 87651 STREP A DNA AMP PROBE: CPT | Performed by: EMERGENCY MEDICINE

## 2024-10-31 PROCEDURE — 96365 THER/PROPH/DIAG IV INF INIT: CPT

## 2024-10-31 PROCEDURE — 85025 COMPLETE CBC W/AUTO DIFF WBC: CPT | Performed by: EMERGENCY MEDICINE

## 2024-10-31 PROCEDURE — 99283 EMERGENCY DEPT VISIT LOW MDM: CPT

## 2024-10-31 PROCEDURE — 36415 COLL VENOUS BLD VENIPUNCTURE: CPT | Performed by: EMERGENCY MEDICINE

## 2024-10-31 PROCEDURE — 85730 THROMBOPLASTIN TIME PARTIAL: CPT | Performed by: EMERGENCY MEDICINE

## 2024-10-31 PROCEDURE — 85610 PROTHROMBIN TIME: CPT | Performed by: EMERGENCY MEDICINE

## 2024-10-31 PROCEDURE — 80048 BASIC METABOLIC PNL TOTAL CA: CPT | Performed by: EMERGENCY MEDICINE

## 2024-10-31 RX ORDER — CLINDAMYCIN PHOSPHATE 900 MG/50ML
900 INJECTION, SOLUTION INTRAVENOUS ONCE
Status: COMPLETED | OUTPATIENT
Start: 2024-10-31 | End: 2024-11-01

## 2024-10-31 RX ADMIN — CLINDAMYCIN PHOSPHATE 900 MG: 900 INJECTION, SOLUTION INTRAVENOUS at 23:48

## 2024-11-01 ENCOUNTER — APPOINTMENT (EMERGENCY)
Dept: CT IMAGING | Facility: HOSPITAL | Age: 39
End: 2024-11-01
Payer: COMMERCIAL

## 2024-11-01 LAB
ANION GAP SERPL CALCULATED.3IONS-SCNC: 7 MMOL/L (ref 4–13)
APTT PPP: 25 SECONDS (ref 23–34)
BUN SERPL-MCNC: 9 MG/DL (ref 5–25)
CALCIUM SERPL-MCNC: 9.1 MG/DL (ref 8.4–10.2)
CHLORIDE SERPL-SCNC: 101 MMOL/L (ref 96–108)
CO2 SERPL-SCNC: 26 MMOL/L (ref 21–32)
CREAT SERPL-MCNC: 0.67 MG/DL (ref 0.6–1.3)
GFR SERPL CREATININE-BSD FRML MDRD: 109 ML/MIN/1.73SQ M
GLUCOSE SERPL-MCNC: 132 MG/DL (ref 65–140)
INR PPP: 0.96 (ref 0.85–1.19)
POTASSIUM SERPL-SCNC: 4.2 MMOL/L (ref 3.5–5.3)
PROTHROMBIN TIME: 13.3 SECONDS (ref 12.3–15)
S PYO DNA THROAT QL NAA+PROBE: NOT DETECTED
SODIUM SERPL-SCNC: 134 MMOL/L (ref 135–147)

## 2024-11-01 PROCEDURE — 70491 CT SOFT TISSUE NECK W/DYE: CPT

## 2024-11-01 PROCEDURE — 96375 TX/PRO/DX INJ NEW DRUG ADDON: CPT

## 2024-11-01 RX ORDER — OXYCODONE HYDROCHLORIDE 10 MG/1
5 TABLET ORAL EVERY 6 HOURS PRN
Qty: 7 TABLET | Refills: 0 | Status: SHIPPED | OUTPATIENT
Start: 2024-11-01 | End: 2024-11-11

## 2024-11-01 RX ORDER — KETOROLAC TROMETHAMINE 30 MG/ML
15 INJECTION, SOLUTION INTRAMUSCULAR; INTRAVENOUS ONCE
Status: COMPLETED | OUTPATIENT
Start: 2024-11-01 | End: 2024-11-01

## 2024-11-01 RX ORDER — OXYCODONE HYDROCHLORIDE 5 MG/1
5 TABLET ORAL ONCE
Status: COMPLETED | OUTPATIENT
Start: 2024-11-01 | End: 2024-11-01

## 2024-11-01 RX ADMIN — OXYCODONE HYDROCHLORIDE 5 MG: 5 TABLET ORAL at 01:32

## 2024-11-01 RX ADMIN — IOHEXOL 85 ML: 350 INJECTION, SOLUTION INTRAVENOUS at 00:24

## 2024-11-01 RX ADMIN — KETOROLAC TROMETHAMINE 15 MG: 30 INJECTION, SOLUTION INTRAMUSCULAR at 01:32

## 2024-11-01 NOTE — ED PROVIDER NOTES
"Time reflects when diagnosis was documented in both MDM as applicable and the Disposition within this note       Time User Action Codes Description Comment    11/1/2024  1:01 AM Michael Moss Add [K08.89] Pain, dental     11/1/2024  1:13 AM Michael Moss Add [K04.7] Dental infection           ED Disposition       ED Disposition   Discharge    Condition   Stable    Date/Time   Fri Nov 1, 2024  1:01 AM    Comment   Ines Solares discharge to home/self care.                   Assessment & Plan       Medical Decision Making  Amount and/or Complexity of Data Reviewed  Labs: ordered.  Radiology: ordered.    Risk  Prescription drug management.      Patient presents for dental pain due to suspected dental kierra. Patient not immunosuppressed, afebrile and well appearing with patent airway, have low suspicfion for deep space infection or any concern for airway compromise. Based on history, physical, and work up. No evidence of tooth fracture, avulsion, or bleeding socket. No evidence of RPA, PTA, [place]’s angina, periapical abscess. Offered patient dental nerve block for pain which patient accepted/declined_. Instructed patient to continue to treat pain with ibuprofen/acetaminophen until they see a dentist. Patient discharged home and will follow up with dentist. Discussed return precautions for odontogenic infections and other dental pain emergencies. Will provide dental clinic list.    Portions of the record may have been created with voice recognition software. Occasional wrong word or \"sound a like\" substitutions may have occurred due to the inherent limitations of voice recognition software. Read the chart carefully and recognize, using context, where substitutions have occurred. .       ED Course as of 11/01/24 1802   Thu Oct 31, 2024   4585 Patient seen, evaluated, examined, 39-year-old female presents with a 1 to 2-day history of right-sided dental pain with sore throat, no fever, was started on amoxicillin today " by her PCP does not have an appointment for a dentist near future.  Swelling along the gumline along with the right sided tonsillar arch.    Brief focused differential dx in this patient is as follows:  Dental infection / dental abscess vs. Peritonsillar abscess.   Fri Nov 01, 2024   0124 Reviewed with the patient, the imaging showed no discernible abscess to be drained, reactive lymphadenopathy noted, and the area of the dental infection on the right upper dentition, with #4 and 5, patient is on appropriate dosage of amoxicillin, patient received 1 dose of IV clindamycin here 900 mg IV, no trismus, phonation normal, moving of the trachea does not elicit a significant mount of pain, patient is stable for discharge to home.       Medications   clindamycin (CLEOCIN) IVPB (premix in dextrose) 900 mg 50 mL (0 mg Intravenous Stopped 11/1/24 0018)   iohexol (OMNIPAQUE) 350 MG/ML injection (MULTI-DOSE) 85 mL (85 mL Intravenous Given 11/1/24 0024)   oxyCODONE (ROXICODONE) IR tablet 5 mg (5 mg Oral Given 11/1/24 0132)   ketorolac (TORADOL) injection 15 mg (15 mg Intravenous Given 11/1/24 0132)       ED Risk Strat Scores                           SBIRT 20yo+      Flowsheet Row Most Recent Value   Initial Alcohol Screen: US AUDIT-C     1. How often do you have a drink containing alcohol? 0 Filed at: 10/31/2024 2306   2. How many drinks containing alcohol do you have on a typical day you are drinking?  0 Filed at: 10/31/2024 2306   3b. FEMALE Any Age, or MALE 65+: How often do you have 4 or more drinks on one occassion? 0 Filed at: 10/31/2024 2306   Audit-C Score 0 Filed at: 10/31/2024 2306   TRU: How many times in the past year have you...    Used an illegal drug or used a prescription medication for non-medical reasons? Never Filed at: 10/31/2024 2306                            History of Present Illness       Chief Complaint   Patient presents with    Dental Pain     Right upper facial swelling with dental abscess, started  abx today. Having difficulty sleeping  and swallowing. Uses cpap at night and her mask is causing too much pressure.       Past Medical History:   Diagnosis Date    Anxiety     Asthma     Difficulty using continuous positive airway pressure (CPAP) device 2023    Disease of thyroid gland     Excessive daytime sleepiness 2023    GERD (gastroesophageal reflux disease)     Hypertension     MRSA (methicillin resistant Staphylococcus aureus)     right leg & right leg inner thigh      Past Surgical History:   Procedure Laterality Date    BREAST BIOPSY Right     benign     SECTION      x1 live birth     SECTION      LEG SURGERY Right 2015    removal of MRSA    THYROIDECTOMY  2015      Family History   Problem Relation Age of Onset    Cervical cancer Mother     Hypertension Mother     Ovarian cancer Mother     Hypertension Father     Heart disease Maternal Uncle     Breast cancer Paternal Grandmother         My dads mom  of breast cancer    No Known Problems Sister     No Known Problems Daughter     No Known Problems Maternal Grandmother     No Known Problems Maternal Grandfather     No Known Problems Paternal Grandfather     No Known Problems Sister     No Known Problems Daughter     No Known Problems Paternal Aunt     No Known Problems Paternal Aunt     No Known Problems Paternal Aunt     No Known Problems Paternal Aunt     No Known Problems Paternal Aunt     No Known Problems Paternal Aunt     No Known Problems Paternal Aunt       Social History     Tobacco Use    Smoking status: Never    Smokeless tobacco: Never   Vaping Use    Vaping status: Never Used   Substance Use Topics    Alcohol use: Not Currently     Comment: socially    Drug use: Never      E-Cigarette/Vaping    E-Cigarette Use Never User       E-Cigarette/Vaping Substances    Nicotine No     THC No     CBD No     Flavoring No     Other No     Unknown No       I have reviewed and agree with the history as documented.      HPI    Review of Systems   Constitutional:  Positive for diaphoresis. Negative for chills, fatigue and fever.   HENT:  Positive for facial swelling and sore throat. Negative for hearing loss, mouth sores, nosebleeds, postnasal drip, rhinorrhea, sinus pressure, sinus pain, sneezing, trouble swallowing and voice change.    Eyes: Negative.    Respiratory: Negative.  Negative for chest tightness and shortness of breath.    Cardiovascular: Negative.  Negative for chest pain, palpitations and leg swelling.   Gastrointestinal:  Negative for abdominal pain.   Endocrine: Negative.    Genitourinary: Negative.    Musculoskeletal: Negative.    Skin: Negative.    Allergic/Immunologic: Negative.    Neurological: Negative.    Hematological: Negative.    Psychiatric/Behavioral: Negative.             Objective       ED Triage Vitals [10/31/24 2304]   Temperature Pulse Blood Pressure Respirations SpO2 Patient Position - Orthostatic VS   98.7 °F (37.1 °C) 103 (!) 179/77 16 96 % Sitting      Temp Source Heart Rate Source BP Location FiO2 (%) Pain Score    Tympanic Monitor Left arm -- 7      Vitals      Date and Time Temp Pulse SpO2 Resp BP Pain Score FACES Pain Rating User   10/31/24 2304 98.7 °F (37.1 °C) 103 96 % 16 179/77 7 -- TG            Physical Exam  Vitals and nursing note reviewed.   Constitutional:       General: She is not in acute distress.     Appearance: Normal appearance. She is normal weight. She is not ill-appearing, toxic-appearing or diaphoretic.   HENT:      Head: Normocephalic and atraumatic.      Comments:   Ears appear normal.  External auditory canals patent without erythema or edema bilaterally.  TM grey/flat bilaterally.  Nose normal inspection, no deformity, nares patent bilaterally.  No septal hematoma, No epistaxis.  Mucous membranes moist, pink.  Tongue midline without edema.  Uvula midline without deviation.  Posterior pharynx widely patent.  No posterior erythema.  Tonsils without edema, + erythema:  R tonsillar ridge,  No purulent exudate.  No tongue or lip swelling present.  No trismus.  No drooling or pooling of secretions. No stridor w/o evidence of brawniness under the tongue.      Right Ear: External ear normal.      Left Ear: External ear normal.      Nose: Nose normal.      Mouth/Throat:      Mouth: Mucous membranes are moist.      Pharynx: Oropharynx is clear.     Eyes:      Extraocular Movements: Extraocular movements intact.      Conjunctiva/sclera: Conjunctivae normal.      Pupils: Pupils are equal, round, and reactive to light.   Cardiovascular:      Rate and Rhythm: Normal rate and regular rhythm.      Pulses: Normal pulses.      Heart sounds: Normal heart sounds.   Pulmonary:      Effort: Pulmonary effort is normal. No respiratory distress.      Breath sounds: Normal breath sounds. No stridor. No wheezing, rhonchi or rales.   Chest:      Chest wall: No tenderness.   Abdominal:      General: Abdomen is flat. Bowel sounds are normal.   Musculoskeletal:         General: Normal range of motion.      Cervical back: Normal range of motion.   Skin:     General: Skin is warm.      Capillary Refill: Capillary refill takes less than 2 seconds.   Neurological:      General: No focal deficit present.      Mental Status: She is alert and oriented to person, place, and time. Mental status is at baseline.   Psychiatric:         Mood and Affect: Mood normal.         Behavior: Behavior normal.         Thought Content: Thought content normal.         Judgment: Judgment normal.         Results Reviewed       Procedure Component Value Units Date/Time    Basic metabolic panel [[id number]]  (Abnormal) Collected: 10/31/24 6007    Lab Status: Final result Specimen: Blood from Arm, Right Updated: 11/01/24 0014     Sodium 134 mmol/L      Potassium 4.2 mmol/L      Chloride 101 mmol/L      CO2 26 mmol/L      ANION GAP 7 mmol/L      BUN 9 mg/dL      Creatinine 0.67 mg/dL      Glucose 132 mg/dL      Calcium 9.1 mg/dL      eGFR  109 ml/min/1.73sq m     Narrative:      National Kid[employer] Disease Foundation guidelines for Chronic Kidney Disease (CKD):     Stage 1 with normal or high GFR (GFR > 90 mL/min/1.73 square meters)    Stage 2 Mild CKD (GFR = 60-89 mL/min/1.73 square meters)    Stage 3A Moderate CKD (GFR = 45-59 mL/min/1.73 square meters)    Stage 3B Moderate CKD (GFR = 30-44 mL/min/1.73 square meters)    Stage 4 Severe CKD (GFR = 15-29 mL/min/1.73 square meters)    Stage 5 End Stage CKD (GFR <15 mL/min/1.73 square meters)  Note: GFR calculation is accurate only with a steady state creatinine    Strep A PCR [[id number]]  (Normal) Collected: 10/31/24 2337    Lab Status: Final result Specimen: Throat Updated: 11/01/24 0007     STREP A PCR Not Detected    Protime-INR [[id number]]  (Normal) Collected: 10/31/24 2348    Lab Status: Final result Specimen: Blood from Arm, Right Updated: 11/01/24 0006     Protime 13.3 seconds      INR 0.96    Narrative:      INR Therapeutic Range    Indication                                             INR Range      Atrial Fibrillation                                               2.0-3.0  Hypercoagulable State                                    2.0.2.3  Left Ventricular Asist Device                            2.0-3.0  Mechanical Heart Valve                                  -    Aortic(with afib, MI, embolism, HF, LA enlargement,    and/or coagulopathy)                                     2.0-3.0 (2.5-3.5)     Mitral                                                             2.5-3.5  Prosthetic/Bioprosthetic Heart Valve               2.0-3.0  Venous thromboembolism (VTE: VT, PE        2.0-3.0    APTT [[id number]]  (Normal) Collected: 10/31/24 2348    Lab Status: Final result Specimen: Blood from Arm, Right Updated: 11/01/24 0006     PTT 25 seconds     CBC and differential [[id number]]  (Abnormal) Collected: 10/31/24 2348    Lab Status: Final result Specimen: Blood from Arm, Right Updated: 10/31/24 2354     WBC  12.45 Thousand/uL      RBC 4.79 Million/uL      Hemoglobin 12.5 g/dL      Hematocrit 40.4 %      MCV 84 fL      MCH 26.1 pg      MCHC 30.9 g/dL      RDW 15.0 %      MPV 9.1 fL      Platelets 326 Thousands/uL      nRBC 0 /100 WBCs      Segmented % 69 %      Immature Grans % 0 %      Lymphocytes % 18 %      Monocytes % 10 %      Eosinophils Relative 2 %      Basophils Relative 1 %      Absolute Neutrophils 8.57 Thousands/µL      Absolute Immature Grans 0.04 Thousand/uL      Absolute Lymphocytes 2.26 Thousands/µL      Absolute Monocytes 1.28 Thousand/µL      Eosinophils Absolute 0.22 Thousand/µL      Basophils Absolute 0.08 Thousands/µL             CT soft tissue neck with contrast   Final Interpretation by Collin Bravo MD (11/01 0100)      Minimal to mild subcutaneous inflammatory stranding involving the right maxillary region likely infectious/inflammatory in nature. There is no focal fluid collection to suggest an abscess. No soft tissue gas.      Mild enlargement of the bilateral palatine tonsils as well as mild soft tissue prominence of the adenoids which may be infectious in nature. Clinical relation is recommended.      Workstation performed: [id number]             Procedures    ED Medication and Procedure Management   Prior to Admission Medications   Prescriptions Last Dose Informant Patient Reported? Taking?   Diclofenac Sodium (VOLTAREN) 1 %   No No   Sig: Apply 2 g topically 4 (four) times a day   Patient not taking: Reported on 10/18/2024   LOW-DOSE ASPIRIN PO  Self Yes No   RA Vitamin B-12 100 MCG tablet  Self No No   Sig: TAKE 10 TABLETS BY MOUTH ONCE DAILY   Synthroid 200 MCG tablet   No No   Sig: Take 1 tablet (200 mcg total) by mouth daily   albuterol (ProAir HFA) 90 mcg/act inhaler   No No   Sig: Inhale 2 puffs every 6 (six) hours as needed for wheezing   amoxicillin (AMOXIL) 500 mg capsule   No No   Sig: Take 1 capsule (500 mg total) by mouth every 8 (eight) hours for 10 days   diclofenac  sodium (VOLTAREN) 50 mg EC tablet   No No   Sig: Take 1 tablet (50 mg total) by mouth 2 (two) times a day as needed (pain)   escitalopram (LEXAPRO) 20 mg tablet   No No   Sig: Take 1 tablet (20 mg total) by mouth daily   famotidine (PEPCID) 20 mg tablet   No No   Sig: Take 1 tablet (20 mg total) by mouth 2 (two) times a day   fluticasone (FLONASE) 50 mcg/act nasal spray   No No   Si sprays into each nostril daily   hydroCHLOROthiazide 25 mg tablet   No No   Sig: Take 1 tablet (25 mg total) by mouth daily   levocetirizine (XYZAL) 5 MG tablet   No No   Sig: Take 1 tablet (5 mg total) by mouth every evening   lisinopril (ZESTRIL) 20 mg tablet   No No   Sig: Take 1 tablet (20 mg total) by mouth 2 (two) times a day   montelukast (SINGULAIR) 10 mg tablet   No No   Sig: Take 1 tablet (10 mg total) by mouth daily at bedtime   omeprazole (PriLOSEC) 20 mg delayed release capsule   No No   Sig: Take 1 capsule (20 mg total) by mouth daily   rosuvastatin (CRESTOR) 5 mg tablet   No No   Sig: Take 1 tablet (5 mg total) by mouth daily   semaglutide, 2 mg/dose, (Ozempic) 8 mg/ mL injection pen   No No   Sig: Inject 0.75 mL (2 mg total) under the skin every 7 days      Facility-Administered Medications: None     Discharge Medication List as of 2024  1:20 AM        START taking these medications    Details   oxyCODONE (ROXICODONE) 10 MG TABS Take 0.5 tablets (5 mg total) by mouth every 6 (six) hours as needed for moderate pain for up to 10 days Max Daily Amount: 20 mg, Starting 2024, Until 2024 at 2359, Normal           CONTINUE these medications which have NOT CHANGED    Details   albuterol (ProAir HFA) 90 mcg/act inhaler Inhale 2 puffs every 6 (six) hours as needed for wheezing, Starting 2024, Normal      amoxicillin (AMOXIL) 500 mg capsule Take 1 capsule (500 mg total) by mouth every 8 (eight) hours for 10 days, Starting Wed 10/30/2024, Until Sat 2024, Normal      Diclofenac Sodium  (VOLTAREN) 1 % Apply 2 g topically 4 (four) times a day, Starting Mon 9/9/2024, Normal      diclofenac sodium (VOLTAREN) 50 mg EC tablet Take 1 tablet (50 mg total) by mouth 2 (two) times a day as needed (pain), Starting Mon 9/9/2024, Normal      escitalopram (LEXAPRO) 20 mg tablet Take 1 tablet (20 mg total) by mouth daily, Starting Mon 9/9/2024, Normal      famotidine (PEPCID) 20 mg tablet Take 1 tablet (20 mg total) by mouth 2 (two) times a day, Starting Fri 10/18/2024, Until Mon 10/13/2025, Normal      fluticasone (FLONASE) 50 mcg/act nasal spray 2 sprays into each nostril daily, Starting Mon 9/9/2024, Normal      hydroCHLOROthiazide 25 mg tablet Take 1 tablet (25 mg total) by mouth daily, Starting Mon 9/9/2024, Normal      levocetirizine (XYZAL) 5 MG tablet Take 1 tablet (5 mg total) by mouth every evening, Starting Mon 9/9/2024, Normal      lisinopril (ZESTRIL) 20 mg tablet Take 1 tablet (20 mg total) by mouth 2 (two) times a day, Starting Mon 9/9/2024, Normal      LOW-DOSE ASPIRIN PO Starting Sun 5/1/2022, Historical Med      montelukast (SINGULAIR) 10 mg tablet Take 1 tablet (10 mg total) by mouth daily at bedtime, Starting Mon 9/9/2024, Normal      omeprazole (PriLOSEC) 20 mg delayed release capsule Take 1 capsule (20 mg total) by mouth daily, Starting Mon 9/9/2024, Normal      RA Vitamin B-12 100 MCG tablet TAKE 10 TABLETS BY MOUTH ONCE DAILY, Normal      rosuvastatin (CRESTOR) 5 mg tablet Take 1 tablet (5 mg total) by mouth daily, Starting Mon 9/9/2024, Normal      semaglutide, 2 mg/dose, (Ozempic) 8 mg/ mL injection pen Inject 0.75 mL (2 mg total) under the skin every 7 days, Starting Fri 10/18/2024, Normal      Synthroid 200 MCG tablet Take 1 tablet (200 mcg total) by mouth daily, Starting Mon 9/9/2024, Normal           No discharge procedures on file.  ED SEPSIS DOCUMENTATION   Time reflects when diagnosis was documented in both MDM as applicable and the Disposition within this note       Time User  Action Codes Description Comment    11/1/2024  1:01 AM Michael Moss [K08.89] Pain, dental     11/1/2024  1:13 AM Michael Moss [K04.7] Dental infection                  Michael Moss III, DO  11/01/24 1802

## 2024-11-15 ENCOUNTER — OFFICE VISIT (OUTPATIENT)
Dept: FAMILY MEDICINE CLINIC | Facility: CLINIC | Age: 39
End: 2024-11-15
Payer: COMMERCIAL

## 2024-11-15 VITALS
SYSTOLIC BLOOD PRESSURE: 134 MMHG | HEIGHT: 66 IN | BODY MASS INDEX: 47.09 KG/M2 | OXYGEN SATURATION: 96 % | WEIGHT: 293 LBS | TEMPERATURE: 98.4 F | DIASTOLIC BLOOD PRESSURE: 88 MMHG | HEART RATE: 75 BPM

## 2024-11-15 DIAGNOSIS — J01.01 ACUTE RECURRENT MAXILLARY SINUSITIS: Primary | ICD-10-CM

## 2024-11-15 PROCEDURE — 99213 OFFICE O/P EST LOW 20 MIN: CPT | Performed by: NURSE PRACTITIONER

## 2024-11-15 RX ORDER — AZITHROMYCIN 250 MG/1
TABLET, FILM COATED ORAL
Qty: 6 TABLET | Refills: 0 | Status: SHIPPED | OUTPATIENT
Start: 2024-11-15 | End: 2024-11-19

## 2024-11-15 RX ORDER — PREDNISONE 20 MG/1
20 TABLET ORAL 2 TIMES DAILY WITH MEALS
Qty: 10 TABLET | Refills: 0 | Status: SHIPPED | OUTPATIENT
Start: 2024-11-15 | End: 2024-11-20

## 2024-11-15 NOTE — PROGRESS NOTES
"Name: Ines Solares      : 1985      MRN: [id number]  Encounter Provider: RACHELLE Ospina  Encounter Date: 11/15/2024   Encounter department: [facility] PRIMARY CARE  :  Assessment & Plan  Acute recurrent maxillary sinusitis    Orders:    azithromycin (ZITHROMAX) 250 mg tablet; Take 2 tablets today then 1 tablet daily x 4 days    predniSONE 20 mg tablet; Take 1 tablet (20 mg total) by mouth 2 (two) times a day with meals for 5 days          Depression Screening and Follow-up Plan: Patient was screened for depression during today's encounter. They screened negative with a PHQ-2 score of 0.      History of Present Illness     Here for sinus congestion, started Monday (5 days ago), headaches, worse on the right side. Dry throat, no coughing, no fever, has some nausea, denies vomiting or diarrhea      Review of Systems   Constitutional:  Negative for activity change, chills, fatigue and fever.   HENT:  Positive for congestion, ear pain, postnasal drip, rhinorrhea, sinus pressure and sore throat.    Eyes:  Negative for pain, discharge and redness.   Respiratory:  Negative for cough and wheezing.    Cardiovascular:  Negative for chest pain.   Gastrointestinal:  Positive for nausea. Negative for constipation, diarrhea and vomiting.   Musculoskeletal:  Negative for myalgias.   Skin:  Negative for rash.   Neurological:  Positive for headaches. Negative for dizziness.          Objective   /88   Pulse 75   Temp 98.4 °F (36.9 °C)   Ht 5' 6\" (1.676 m)   Wt 136 kg (299 lb)   LMP 10/03/2024 (Approximate)   SpO2 96%   BMI 48.26 kg/m²      Physical Exam  Vitals and nursing note reviewed.   Constitutional:       General: She is not in acute distress.     Appearance: She is well-developed. She is not diaphoretic.   HENT:      Head: Normocephalic and atraumatic.      Right Ear: Tympanic membrane, ear canal and external ear normal.      Left Ear: Tympanic membrane, ear canal and external ear normal. "      Nose: Congestion and rhinorrhea present.      Mouth/Throat:      Mouth: Mucous membranes are moist.      Pharynx: Uvula midline. Oropharyngeal exudate and posterior oropharyngeal erythema present.   Eyes:      General:         Right eye: No discharge.         Left eye: No discharge.      Conjunctiva/sclera: Conjunctivae normal.   Neck:      Thyroid: No thyromegaly.   Cardiovascular:      Rate and Rhythm: Normal rate and regular rhythm.      Heart sounds: Normal heart sounds. No murmur heard.     No friction rub. No gallop.   Pulmonary:      Effort: Pulmonary effort is normal. No respiratory distress.      Breath sounds: Normal breath sounds. No wheezing or rales.   Musculoskeletal:         General: No tenderness or deformity. Normal range of motion.      Cervical back: Normal range of motion and neck supple.   Skin:     General: Skin is warm and dry.      Findings: No erythema or rash.   Neurological:      Mental Status: She is alert and oriented to person, place, and time.      Cranial Nerves: No cranial nerve deficit.      Coordination: Coordination normal.   Psychiatric:         Mood and Affect: Mood normal.         Behavior: Behavior normal.         Thought Content: Thought content normal.         Judgment: Judgment normal.

## 2025-01-15 ENCOUNTER — PATIENT MESSAGE (OUTPATIENT)
Dept: FAMILY MEDICINE CLINIC | Facility: CLINIC | Age: 40
End: 2025-01-15

## 2025-01-15 DIAGNOSIS — K04.7 DENTAL ABSCESS: Primary | ICD-10-CM

## 2025-01-16 RX ORDER — AMOXICILLIN 500 MG/1
500 CAPSULE ORAL EVERY 8 HOURS SCHEDULED
Qty: 30 CAPSULE | Refills: 0 | Status: SHIPPED | OUTPATIENT
Start: 2025-01-16 | End: 2025-01-26

## 2025-01-23 ENCOUNTER — OFFICE VISIT (OUTPATIENT)
Dept: OBGYN CLINIC | Facility: CLINIC | Age: 40
End: 2025-01-23
Payer: COMMERCIAL

## 2025-01-23 VITALS
BODY MASS INDEX: 47.09 KG/M2 | DIASTOLIC BLOOD PRESSURE: 80 MMHG | SYSTOLIC BLOOD PRESSURE: 122 MMHG | HEIGHT: 66 IN | WEIGHT: 293 LBS

## 2025-01-23 DIAGNOSIS — Z12.31 ENCOUNTER FOR SCREENING MAMMOGRAM FOR MALIGNANT NEOPLASM OF BREAST: ICD-10-CM

## 2025-01-23 DIAGNOSIS — N93.9 ABNORMAL UTERINE BLEEDING: ICD-10-CM

## 2025-01-23 DIAGNOSIS — Z01.419 ENCOUNTER FOR WELL WOMAN EXAM WITH ROUTINE GYNECOLOGICAL EXAM: Primary | ICD-10-CM

## 2025-01-23 PROCEDURE — 88305 TISSUE EXAM BY PATHOLOGIST: CPT | Performed by: PATHOLOGY

## 2025-01-23 PROCEDURE — 99213 OFFICE O/P EST LOW 20 MIN: CPT | Performed by: OBSTETRICS & GYNECOLOGY

## 2025-01-23 PROCEDURE — 58100 BIOPSY OF UTERUS LINING: CPT | Performed by: OBSTETRICS & GYNECOLOGY

## 2025-01-23 PROCEDURE — 99396 PREV VISIT EST AGE 40-64: CPT | Performed by: OBSTETRICS & GYNECOLOGY

## 2025-01-23 NOTE — PROGRESS NOTES
Endometrial biopsy    Date/Time: 1/23/2025 10:30 AM    Performed by: Macy Fuentes MD  Authorized by: Macy Fuentes MD  Universal Protocol:  Consent: Verbal consent obtained. Written consent obtained.  Risks and benefits: risks, benefits and alternatives were discussed  Consent given by: patient  Patient understanding: patient states understanding of the procedure being performed  Patient consent: the patient's understanding of the procedure matches consent given  Procedure consent: procedure consent matches procedure scheduled  Patient identity confirmed: verbally with patient    Indication:     Indications: Other disorder of menstruation and other abnormal bleeding from female genital tract    Procedure:     Procedure: endometrial biopsy with Pipelle      A bivalve speculum was placed in the vagina: yes      Cervix cleaned and prepped: yes      A paracervical block was performed: no      An intracervical block was performed: no      Uterus sounded: yes      Uterus sound depth (cm):  8    Specimen collected: specimen collected and sent to pathology

## 2025-01-23 NOTE — PROGRESS NOTES
"[facility] [address] Jossie Eason PA    ASSESSMENT/PLAN: Ines Solares is a 39 y.o.  who presents for annual gynecologic exam.    Encounter for routine gynecologic examination  - Routine well woman exam completed today.  - Cervical Cancer Screening: Current ASCCP Guidelines reviewed. Last Pap: 10/26/2023 . Next Pap Due:   - Contraceptive counseling discussed.  Current contraception: Vasectomy   - Breast Cancer Screening: Last Mammogram 2024, mammo ordered    Additional problems addressed at this visit:  1. Encounter for well woman exam with routine gynecological exam  2. Encounter for screening mammogram for malignant neoplasm of breast  -     Mammo screening bilateral w 3d and cad; Future; Expected date: 2025  3. Abnormal uterine bleeding  -     Tissue Exam        CC:  Annual Gynecologic Examination    HPI: Ines Solares is a 39 y.o.  who presents for annual gynecologic examination.  Gynecologic Exam    Patient presents for annual exam. States her cycles are heavy and irregular. Being treated for anemia. Discussed options, including Lysteda, contraception, IUD, ablation, or hysterectomy. EMB done today  Patient had adverse reactions to OCPs, therefore declines.   Patient would be a good candidate for IUD or ablation, however, considering hysterectomy as well.     The following portions of the patient's history were reviewed and updated as appropriate: allergies, current medications, past family history, past medical history, obstetric history, gynecologic history, past social history, past surgical history and problem list.    Review of Systems   Constitutional: Negative.    Respiratory: Negative.     Cardiovascular: Negative.    Gastrointestinal: Negative.    Genitourinary:  Positive for menstrual problem.   Musculoskeletal: Negative.    All other systems reviewed and are negative.        Objective:  /80   Ht 5' 6\" (1.676 m)   Wt (!) 139 kg " (306 lb 3.2 oz)   LMP 01/16/2025 (Approximate)   BMI 49.42 kg/m²    Physical Exam  Vitals reviewed.   Constitutional:       General: She is not in acute distress.     Appearance: She is well-developed.   HENT:      Head: Normocephalic and atraumatic.      Nose: Nose normal.   Cardiovascular:      Rate and Rhythm: Normal rate.   Pulmonary:      Effort: Pulmonary effort is normal. No respiratory distress.   Chest:   Breasts:     Breasts are symmetrical.      Right: Normal. No mass, nipple discharge, skin change or tenderness.      Left: Normal. No mass, nipple discharge, skin change or tenderness.   Abdominal:      General: There is no distension.      Palpations: Abdomen is soft. There is no mass.      Tenderness: There is no abdominal tenderness. There is no guarding or rebound.   Genitourinary:     General: Normal vulva.      Exam position: Lithotomy position.      Labia:         Right: No lesion.         Left: No lesion.       Urethra: No prolapse (urethral meatus normal).      Vagina: Normal. No vaginal discharge, erythema or bleeding.      Cervix: Normal.      Uterus: Normal.       Adnexa: Right adnexa normal and left adnexa normal.      Comments: Minimal decensus  Musculoskeletal:         General: Normal range of motion.      Cervical back: Normal range of motion.   Lymphadenopathy:      Upper Body:      Right upper body: No supraclavicular, axillary or pectoral adenopathy.      Left upper body: No supraclavicular, axillary or pectoral adenopathy.      Lower Body: No right inguinal adenopathy. No left inguinal adenopathy.   Skin:     General: Skin is warm and dry.   Neurological:      Mental Status: She is alert and oriented to person, place, and time.   Psychiatric:         Behavior: Behavior normal.         Thought Content: Thought content normal.         Judgment: Judgment normal.           Macy Fuentes MD  [facility]  01/23/25 12:59 PM

## 2025-01-28 ENCOUNTER — RESULTS FOLLOW-UP (OUTPATIENT)
Dept: OTHER | Facility: HOSPITAL | Age: 40
End: 2025-01-28

## 2025-01-28 PROCEDURE — 88305 TISSUE EXAM BY PATHOLOGIST: CPT | Performed by: PATHOLOGY

## 2025-02-04 DIAGNOSIS — E11.9 TYPE 2 DIABETES MELLITUS WITHOUT COMPLICATION, WITHOUT LONG-TERM CURRENT USE OF INSULIN (HCC): ICD-10-CM

## 2025-02-04 DIAGNOSIS — F41.9 ANXIETY: ICD-10-CM

## 2025-02-04 DIAGNOSIS — E03.9 HYPOTHYROIDISM, UNSPECIFIED TYPE: ICD-10-CM

## 2025-02-04 DIAGNOSIS — K21.00 GASTROESOPHAGEAL REFLUX DISEASE WITH ESOPHAGITIS WITHOUT HEMORRHAGE: ICD-10-CM

## 2025-02-04 DIAGNOSIS — I10 HYPERTENSION, UNSPECIFIED TYPE: ICD-10-CM

## 2025-02-04 DIAGNOSIS — E78.5 HYPERLIPIDEMIA LDL GOAL <70: ICD-10-CM

## 2025-02-04 RX ORDER — LEVOTHYROXINE SODIUM 200 MCG
200 TABLET ORAL DAILY
Qty: 90 TABLET | Refills: 1 | Status: SHIPPED | OUTPATIENT
Start: 2025-02-04 | End: 2025-02-09 | Stop reason: SDUPTHER

## 2025-02-04 RX ORDER — ESCITALOPRAM OXALATE 20 MG/1
20 TABLET ORAL DAILY
Qty: 90 TABLET | Refills: 1 | Status: SHIPPED | OUTPATIENT
Start: 2025-02-04 | End: 2025-02-09 | Stop reason: SDUPTHER

## 2025-02-04 RX ORDER — FAMOTIDINE 20 MG/1
20 TABLET, FILM COATED ORAL 2 TIMES DAILY
Qty: 180 TABLET | Refills: 1 | Status: SHIPPED | OUTPATIENT
Start: 2025-02-04 | End: 2025-02-09 | Stop reason: SDUPTHER

## 2025-02-04 RX ORDER — LISINOPRIL 20 MG/1
20 TABLET ORAL 2 TIMES DAILY
Qty: 180 TABLET | Refills: 1 | Status: SHIPPED | OUTPATIENT
Start: 2025-02-04 | End: 2025-02-09 | Stop reason: SDUPTHER

## 2025-02-04 RX ORDER — ROSUVASTATIN CALCIUM 5 MG/1
5 TABLET, COATED ORAL DAILY
Qty: 90 TABLET | Refills: 1 | Status: SHIPPED | OUTPATIENT
Start: 2025-02-04 | End: 2025-02-09 | Stop reason: SDUPTHER

## 2025-02-09 DIAGNOSIS — F41.9 ANXIETY: ICD-10-CM

## 2025-02-09 DIAGNOSIS — K21.00 GASTROESOPHAGEAL REFLUX DISEASE WITH ESOPHAGITIS WITHOUT HEMORRHAGE: ICD-10-CM

## 2025-02-09 DIAGNOSIS — E78.5 HYPERLIPIDEMIA LDL GOAL <70: ICD-10-CM

## 2025-02-09 DIAGNOSIS — E11.9 TYPE 2 DIABETES MELLITUS WITHOUT COMPLICATION, WITHOUT LONG-TERM CURRENT USE OF INSULIN (HCC): ICD-10-CM

## 2025-02-09 DIAGNOSIS — I10 HYPERTENSION, UNSPECIFIED TYPE: ICD-10-CM

## 2025-02-09 DIAGNOSIS — E03.9 HYPOTHYROIDISM, UNSPECIFIED TYPE: ICD-10-CM

## 2025-02-10 RX ORDER — ROSUVASTATIN CALCIUM 5 MG/1
5 TABLET, COATED ORAL DAILY
Qty: 90 TABLET | Refills: 0 | Status: SHIPPED | OUTPATIENT
Start: 2025-02-10 | End: 2025-02-14

## 2025-02-10 RX ORDER — FAMOTIDINE 20 MG/1
20 TABLET, FILM COATED ORAL 2 TIMES DAILY
Qty: 180 TABLET | Refills: 0 | Status: SHIPPED | OUTPATIENT
Start: 2025-02-10

## 2025-02-10 RX ORDER — LISINOPRIL 20 MG/1
20 TABLET ORAL 2 TIMES DAILY
Qty: 180 TABLET | Refills: 0 | Status: SHIPPED | OUTPATIENT
Start: 2025-02-10

## 2025-02-10 RX ORDER — ESCITALOPRAM OXALATE 20 MG/1
20 TABLET ORAL DAILY
Qty: 90 TABLET | Refills: 0 | Status: SHIPPED | OUTPATIENT
Start: 2025-02-10

## 2025-02-10 RX ORDER — LEVOTHYROXINE SODIUM 200 MCG
200 TABLET ORAL DAILY
Qty: 90 TABLET | Refills: 0 | Status: SHIPPED | OUTPATIENT
Start: 2025-02-10

## 2025-02-12 ENCOUNTER — TELEPHONE (OUTPATIENT)
Age: 40
End: 2025-02-12

## 2025-02-12 NOTE — TELEPHONE ENCOUNTER
PA for Ozempic 2 mg SUBMITTED to     via    []CMM-KEY:   [x]Surescripts-Case ID # [id number]   []Availity-Auth ID # NDC #   []Faxed to plan   []Other website   []Phone call Case ID #     [x]PA sent as URGENT    All office notes, labs and other pertaining documents and studies sent. Clinical questions answered. Awaiting determination from insurance company.     Turnaround time for your insurance to make a decision on your Prior Authorization can take 7-21 business days.

## 2025-02-12 NOTE — TELEPHONE ENCOUNTER
PA for rosuvastatin (CRESTOR) 5 mg tablet SUBMITTED to     via    []CMM-KEY:   [x]Surescripts-Case ID # [id number]   []Availity-Auth ID # NDC #   []Faxed to plan   []Other website   []Phone call Case ID #     [x]PA sent as URGENT    All office notes, labs and other pertaining documents and studies sent. Clinical questions answered. Awaiting determination from insurance company.     Turnaround time for your insurance to make a decision on your Prior Authorization can take 7-21 business days.

## 2025-02-13 ENCOUNTER — TELEPHONE (OUTPATIENT)
Age: 40
End: 2025-02-13

## 2025-02-13 DIAGNOSIS — B34.9 ACUTE VIRAL SYNDROME: ICD-10-CM

## 2025-02-13 DIAGNOSIS — E78.5 HYPERLIPIDEMIA LDL GOAL <70: Primary | ICD-10-CM

## 2025-02-13 DIAGNOSIS — R05.1 ACUTE COUGH: ICD-10-CM

## 2025-02-13 RX ORDER — ALBUTEROL SULFATE 90 UG/1
2 INHALANT RESPIRATORY (INHALATION) EVERY 6 HOURS PRN
Qty: 18 G | Refills: 5 | Status: SHIPPED | OUTPATIENT
Start: 2025-02-13

## 2025-02-13 NOTE — TELEPHONE ENCOUNTER
Jorgito from [facility] called to give us verbal denial of the pts PA for the rosuvastatin. Told him we did receive the letter as well. He states he wanted to follow up with us as well in case the provider or pt wanted to appeal the denial.     Please advise, thanks.

## 2025-02-14 ENCOUNTER — TELEPHONE (OUTPATIENT)
Age: 40
End: 2025-02-14

## 2025-02-14 RX ORDER — ATORVASTATIN CALCIUM 10 MG/1
10 TABLET, FILM COATED ORAL DAILY
Qty: 90 TABLET | Refills: 3 | Status: SHIPPED | OUTPATIENT
Start: 2025-02-14

## 2025-02-14 NOTE — TELEPHONE ENCOUNTER
[address] [place] [address] ED  EMERGENCY DEPARTMENT ENCOUNTER      Pt Name: Elli Kidd  MRN: [id number]  [place] 1982  Date of evaluation: 3/17/20  CHIEF COMPLAINT       Chief Complaint   Patient presents with    Rash     [facility]   Presents emergency room via private auto with rash that has been in with over the head several months. He has been diagnosed with scabies without improvement. He is also been treated for fungal infection. He saw a dermatologist but at that point the rash was almost resolved until it started again. He states that he has had the same exact rash in the past that resolved with both the cream and pills for scabies. The history is provided by the patient. REVIEW OF SYSTEMS     Review of Systems   Constitutional: Negative for activity change and fever. Respiratory: Negative for cough and shortness of breath. Cardiovascular: Negative for chest pain and palpitations. Gastrointestinal: Negative for diarrhea, nausea and vomiting. Musculoskeletal: Negative for arthralgias and myalgias. Skin: Positive for rash. Negative for color change. Neurological: Negative for dizziness and headaches. PASTMEDICAL HISTORY     Past Medical History:   Diagnosis Date    ADHD (attention deficit hyperactivity disorder)     Depression      SURGICAL HISTORY       Past Surgical History:   Procedure Laterality Date    ADENOIDECTOMY AND TYMPANOSTOMY TUBE PLACEMENT      FACIAL COSMETIC SURGERY       CURRENT MEDICATIONS       Discharge Medication List as of 3/17/2020  5:01 PM      CONTINUE these medications which have NOT CHANGED    Details   Amphetamine-Dextroamphetamine (ADDERALL PO) Take by mouthHistorical Med      citalopram (CELEXA) 40 MG tablet Take 40 mg by mouth dailyHistorical Med           ALLERGIES     is allergic to codeine. FAMILY HISTORY     has no family status information on file.       SOCIAL HISTORY       Social History     Tobacco Use    Smoking status: Let her know her insurance is requiring she try Atorvastatin before paying for Rosuvastatin. Please send me Atorvastatin 10mg daily if she is agreeable Never Smoker    Smokeless tobacco: Never Used   Substance Use Topics    Alcohol use: No    Drug use: No     PHYSICAL EXAM     INITIAL VITALS: /82   Pulse 98   Temp 97.9 °F (36.6 °C) (Oral)   Resp 16   Ht 5' 9\" (1.753 m)   Wt 171 lb (77.6 kg)   SpO2 99%   BMI 25.25 kg/m²    Physical Exam  Constitutional:       Appearance: Normal appearance. HENT:      Right Ear: External ear normal.      Left Ear: External ear normal.      Mouth/Throat:      Mouth: Mucous membranes are moist.      Pharynx: Oropharynx is clear. Eyes:      General:         Right eye: No discharge. Left eye: No discharge. Conjunctiva/sclera: Conjunctivae normal.   Neck:      Musculoskeletal: Neck supple. Cardiovascular:      Rate and Rhythm: Normal rate and regular rhythm. Pulmonary:      Effort: Pulmonary effort is normal.      Breath sounds: Normal breath sounds. Abdominal:      Palpations: Abdomen is soft. Tenderness: There is no abdominal tenderness. Lymphadenopathy:      Cervical: No cervical adenopathy. Skin:     General: Skin is warm and dry. Comments: Scattered 1 to 3 mm papules to the bilateral forearms and lower legs. Several of these areas are excoriated. No visible burrows but he does have some papules on the webspaces between the fingers. No vesicles. Neurological:      General: No focal deficit present. Mental Status: He is alert and oriented to person, place, and time. Psychiatric:         Mood and Affect: Mood normal.         Thought Content: Thought content normal.         DIAGNOSTIC RESULTS     RADIOLOGY:All plain film, CT, MRI, and formal ultrasound images (except ED bedside ultrasound) are read by the radiologist, see reports below, unless otherwise noted in MDM or here.     Interpretation per the Radiologist below, if available at the time of this note:    No orders to display       LABS:  Labs Reviewed - No data to display    All other labs were within normal range or not returned as of this dictation. EMERGENCY DEPARTMENT COURSE and DIFFERENTIAL DIAGNOSIS/MDM:   Vitals:    Vitals:    20 1640   BP: 134/82   Pulse: 98   Resp: 16   Temp: 97.9 °F (36.6 °C)   TempSrc: Oral   SpO2: 99%   Weight: 171 lb (77.6 kg)   Height: 5' 9\" (1.753 m)       Medical Decision Makin-year-old male with rash. He will be treated with both topical and oral medications. He was educated in the importance of following up with his dermatologist.           The patient was given the following meds in the ED:  Orders Placed This Encounter   Medications    permethrin (ELIMITE) 5 % cream     Sig: Apply topically to entire body or affected areas once, leave on for 8 hours, then rinse. May repeat in 1-2 weeks if symptoms persist.     Dispense:  1 Tube     Refill:  1    ivermectin 3 MG tablet     Sig: Take 4 tablets by mouth once for 1 dose     Dispense:  4 tablet     Refill:  1       FINAL IMPRESSION      1. Rash          DISPOSITION/PLAN   DISPOSITION Decision To Discharge 2020 04:48:59 PM      PATIENT REFERRED TO:     Follow-up with your dermatologist          DISCHARGE MEDICATIONS:     Discharge Medication List as of 3/17/2020  5:01 PM      START taking these medications    Details   permethrin (ELIMITE) 5 % cream Apply topically to entire body or affected areas once, leave on for 8 hours, then rinse.   May repeat in 1-2 weeks if symptoms persist., Disp-1 Tube, R-1, Print      ivermectin 3 MG tablet Take 4 tablets by mouth once for 1 dose, Disp-4 tablet, R-1Print             CRITICAL CARE TIME       Please note that portions of this note were completed with a voice recognition program.      DEBBIE TENA, APRN - CNP           JANESSA eRdmond - CNP  20 1222

## 2025-02-14 NOTE — TELEPHONE ENCOUNTER
PA for Ozempic 2 mg  DENIED    Reason:(Screenshot if applicable)        Message sent to office clinical pool Yes    Denial letter scanned into Media Yes    Appeal started No (Provider will need to decide if appeal is warranted and send clinical documentation to Prior Authorization Team for initiation.)    **Please follow up with your patient regarding denial and next steps**

## 2025-02-14 NOTE — TELEPHONE ENCOUNTER
This is being denied because she did not get her bloodwork done since August- She needs her HgA1c documented. Please ask her to go to the lab ASAP

## 2025-02-14 NOTE — TELEPHONE ENCOUNTER
PA for rosuvastatin (CRESTOR) 5 mg tablet  DENIED    Reason:(Screenshot if applicable)        Message sent to office clinical pool Yes    Denial letter scanned into Media Yes    Appeal started No (Provider will need to decide if appeal is warranted and send clinical documentation to Prior Authorization Team for initiation.)    **Please follow up with your patient regarding denial and next steps**

## 2025-02-14 NOTE — TELEPHONE ENCOUNTER
Alma called to let the office know that the Ozempic was denied by the medical director and a letter was faxed to the office letting us know about the appeal rights. Please call Alma with any questions. The reference number is [id number]

## 2025-02-17 ENCOUNTER — TRANSCRIBE ORDERS (OUTPATIENT)
Dept: FAMILY MEDICINE CLINIC | Facility: CLINIC | Age: 40
End: 2025-02-17

## 2025-02-17 DIAGNOSIS — E11.9 TYPE 2 DIABETES MELLITUS WITHOUT COMPLICATION, WITHOUT LONG-TERM CURRENT USE OF INSULIN (HCC): Primary | ICD-10-CM

## 2025-02-18 ENCOUNTER — APPOINTMENT (OUTPATIENT)
Dept: LAB | Facility: CLINIC | Age: 40
End: 2025-02-18
Payer: COMMERCIAL

## 2025-02-18 DIAGNOSIS — E11.9 TYPE 2 DIABETES MELLITUS WITHOUT COMPLICATION, WITHOUT LONG-TERM CURRENT USE OF INSULIN (HCC): ICD-10-CM

## 2025-02-18 LAB
EST. AVERAGE GLUCOSE BLD GHB EST-MCNC: 137 MG/DL
HBA1C MFR BLD: 6.4 %

## 2025-02-18 PROCEDURE — 36415 COLL VENOUS BLD VENIPUNCTURE: CPT

## 2025-02-18 PROCEDURE — 83036 HEMOGLOBIN GLYCOSYLATED A1C: CPT

## 2025-02-18 NOTE — TELEPHONE ENCOUNTER
Was her previous HgA1c submitted when she started Ozempic- it was 7.3, this makes zero sense, it's lower because she's on the medication. Do we know that the Prior Auth team is submitting the correct information?

## 2025-02-19 NOTE — TELEPHONE ENCOUNTER
All correct clinical documents including Labs for A1C were sent with the prior auth. Will start the appeal process.     PA for Ozempic 2 mg APPEALED via     []CMM  [x]SS  []Letter sent to insurance via fax   []Other site or means     All necessary records sent. Will await response from insurance company    Turnaround time for a decision to be made on an appeal could take up to 30 business days

## 2025-02-20 NOTE — TELEPHONE ENCOUNTER
Received that Appeal was Denied due to it being thrown out because they need a signed consent from the patient first stating its okay to appeal. I spoke with Erika at [facility] and she states the PA was denied originally by another Doctor. Informed Erika that all clinical documentation and labs showed that patient has Type 2 Diabetes and has the Dx code associated with the Medication. Informed her Also the PA stated that the patient tried and failed metformin.     She states the best way to proceed would be to get a  Signed consent from patient to appeal. The Appeal Denial letter is scanned into patients chart.

## 2025-03-06 DIAGNOSIS — E66.01 MORBID OBESITY WITH BMI OF 45.0-49.9, ADULT (HCC): ICD-10-CM

## 2025-03-06 DIAGNOSIS — E11.9 TYPE 2 DIABETES MELLITUS WITHOUT COMPLICATION, WITHOUT LONG-TERM CURRENT USE OF INSULIN (HCC): ICD-10-CM

## 2025-03-06 DIAGNOSIS — G47.33 OBSTRUCTIVE SLEEP APNEA TREATED WITH CONTINUOUS POSITIVE AIRWAY PRESSURE (CPAP): Primary | ICD-10-CM

## 2025-03-13 DIAGNOSIS — E11.9 TYPE 2 DIABETES MELLITUS WITHOUT COMPLICATION, WITHOUT LONG-TERM CURRENT USE OF INSULIN (HCC): Primary | ICD-10-CM

## 2025-03-19 ENCOUNTER — PATIENT MESSAGE (OUTPATIENT)
Dept: SLEEP CENTER | Facility: CLINIC | Age: 40
End: 2025-03-19

## 2025-03-20 NOTE — PATIENT COMMUNICATION
Last order in [place] was for resupply on 8/14/2024.  No messages noted requesting additional documentation.     Message to @ Angely Ferrer to [employer] via [employer] informing of patient question about requested documentation needed.     Jolancer message to patient to inform we will handle any requests from [employer] and appreciate the message.

## 2025-03-22 ENCOUNTER — APPOINTMENT (OUTPATIENT)
Dept: LAB | Facility: CLINIC | Age: 40
End: 2025-03-22
Payer: COMMERCIAL

## 2025-03-22 DIAGNOSIS — E11.9 TYPE 2 DIABETES MELLITUS WITHOUT COMPLICATION, WITHOUT LONG-TERM CURRENT USE OF INSULIN (HCC): ICD-10-CM

## 2025-03-22 DIAGNOSIS — E78.5 HYPERLIPIDEMIA LDL GOAL <70: ICD-10-CM

## 2025-03-22 DIAGNOSIS — E03.9 HYPOTHYROIDISM, UNSPECIFIED TYPE: ICD-10-CM

## 2025-03-22 LAB
ALBUMIN SERPL BCG-MCNC: 4.1 G/DL (ref 3.5–5)
ALP SERPL-CCNC: 84 U/L (ref 34–104)
ALT SERPL W P-5'-P-CCNC: 18 U/L (ref 7–52)
ANION GAP SERPL CALCULATED.3IONS-SCNC: 8 MMOL/L (ref 4–13)
AST SERPL W P-5'-P-CCNC: 17 U/L (ref 13–39)
BASOPHILS # BLD AUTO: 0.06 THOUSANDS/ÂΜL (ref 0–0.1)
BASOPHILS NFR BLD AUTO: 1 % (ref 0–1)
BILIRUB SERPL-MCNC: 0.43 MG/DL (ref 0.2–1)
BUN SERPL-MCNC: 9 MG/DL (ref 5–25)
CALCIUM SERPL-MCNC: 8.9 MG/DL (ref 8.4–10.2)
CHLORIDE SERPL-SCNC: 103 MMOL/L (ref 96–108)
CHOLEST SERPL-MCNC: 138 MG/DL (ref ?–200)
CO2 SERPL-SCNC: 30 MMOL/L (ref 21–32)
CREAT SERPL-MCNC: 0.6 MG/DL (ref 0.6–1.3)
CREAT UR-MCNC: 368.9 MG/DL
EOSINOPHIL # BLD AUTO: 0.19 THOUSAND/ÂΜL (ref 0–0.61)
EOSINOPHIL NFR BLD AUTO: 2 % (ref 0–6)
ERYTHROCYTE [DISTWIDTH] IN BLOOD BY AUTOMATED COUNT: 15.4 % (ref 11.6–15.1)
EST. AVERAGE GLUCOSE BLD GHB EST-MCNC: 137 MG/DL
GFR SERPL CREATININE-BSD FRML MDRD: 112 ML/MIN/1.73SQ M
GLUCOSE P FAST SERPL-MCNC: 124 MG/DL (ref 65–99)
HBA1C MFR BLD: 6.4 %
HCT VFR BLD AUTO: 40.2 % (ref 34.8–46.1)
HDLC SERPL-MCNC: 40 MG/DL
HGB BLD-MCNC: 12.1 G/DL (ref 11.5–15.4)
IMM GRANULOCYTES # BLD AUTO: 0.03 THOUSAND/UL (ref 0–0.2)
IMM GRANULOCYTES NFR BLD AUTO: 0 % (ref 0–2)
LDLC SERPL CALC-MCNC: 78 MG/DL (ref 0–100)
LYMPHOCYTES # BLD AUTO: 2.1 THOUSANDS/ÂΜL (ref 0.6–4.47)
LYMPHOCYTES NFR BLD AUTO: 25 % (ref 14–44)
MCH RBC QN AUTO: 25.9 PG (ref 26.8–34.3)
MCHC RBC AUTO-ENTMCNC: 30.1 G/DL (ref 31.4–37.4)
MCV RBC AUTO: 86 FL (ref 82–98)
MICROALBUMIN UR-MCNC: 20.2 MG/L
MICROALBUMIN/CREAT 24H UR: 5 MG/G CREATININE (ref 0–30)
MONOCYTES # BLD AUTO: 0.54 THOUSAND/ÂΜL (ref 0.17–1.22)
MONOCYTES NFR BLD AUTO: 6 % (ref 4–12)
NEUTROPHILS # BLD AUTO: 5.58 THOUSANDS/ÂΜL (ref 1.85–7.62)
NEUTS SEG NFR BLD AUTO: 66 % (ref 43–75)
NRBC BLD AUTO-RTO: 0 /100 WBCS
PLATELET # BLD AUTO: 305 THOUSANDS/UL (ref 149–390)
PMV BLD AUTO: 9.6 FL (ref 8.9–12.7)
POTASSIUM SERPL-SCNC: 4.3 MMOL/L (ref 3.5–5.3)
PROT SERPL-MCNC: 7.6 G/DL (ref 6.4–8.4)
RBC # BLD AUTO: 4.67 MILLION/UL (ref 3.81–5.12)
SODIUM SERPL-SCNC: 141 MMOL/L (ref 135–147)
T4 FREE SERPL-MCNC: 1.08 NG/DL (ref 0.61–1.12)
TRIGL SERPL-MCNC: 98 MG/DL (ref ?–150)
TSH SERPL DL<=0.05 MIU/L-ACNC: 0.23 UIU/ML (ref 0.45–4.5)
WBC # BLD AUTO: 8.5 THOUSAND/UL (ref 4.31–10.16)

## 2025-03-22 PROCEDURE — 83036 HEMOGLOBIN GLYCOSYLATED A1C: CPT

## 2025-03-22 PROCEDURE — 82043 UR ALBUMIN QUANTITATIVE: CPT

## 2025-03-22 PROCEDURE — 80053 COMPREHEN METABOLIC PANEL: CPT

## 2025-03-22 PROCEDURE — 85025 COMPLETE CBC W/AUTO DIFF WBC: CPT

## 2025-03-22 PROCEDURE — 84443 ASSAY THYROID STIM HORMONE: CPT

## 2025-03-22 PROCEDURE — 84439 ASSAY OF FREE THYROXINE: CPT

## 2025-03-22 PROCEDURE — 36415 COLL VENOUS BLD VENIPUNCTURE: CPT

## 2025-03-22 PROCEDURE — 80061 LIPID PANEL: CPT

## 2025-03-22 PROCEDURE — 82570 ASSAY OF URINE CREATININE: CPT

## 2025-03-31 ENCOUNTER — OFFICE VISIT (OUTPATIENT)
Dept: FAMILY MEDICINE CLINIC | Facility: CLINIC | Age: 40
End: 2025-03-31
Payer: COMMERCIAL

## 2025-03-31 VITALS
TEMPERATURE: 98.4 F | HEART RATE: 78 BPM | SYSTOLIC BLOOD PRESSURE: 128 MMHG | RESPIRATION RATE: 18 BRPM | DIASTOLIC BLOOD PRESSURE: 78 MMHG | HEIGHT: 66 IN | OXYGEN SATURATION: 97 % | BODY MASS INDEX: 47.09 KG/M2 | WEIGHT: 293 LBS

## 2025-03-31 DIAGNOSIS — E03.9 HYPOTHYROIDISM, UNSPECIFIED TYPE: ICD-10-CM

## 2025-03-31 DIAGNOSIS — E11.9 TYPE 2 DIABETES MELLITUS WITHOUT COMPLICATION, WITHOUT LONG-TERM CURRENT USE OF INSULIN (HCC): Primary | ICD-10-CM

## 2025-03-31 DIAGNOSIS — E55.9 VITAMIN D DEFICIENCY: ICD-10-CM

## 2025-03-31 DIAGNOSIS — S76.211A STRAIN OF RIGHT GROIN: ICD-10-CM

## 2025-03-31 DIAGNOSIS — E78.5 HYPERLIPIDEMIA LDL GOAL <70: ICD-10-CM

## 2025-03-31 PROCEDURE — 99214 OFFICE O/P EST MOD 30 MIN: CPT | Performed by: NURSE PRACTITIONER

## 2025-03-31 NOTE — PROGRESS NOTES
Name: Ines Solares      : 1985      MRN: [id number]  Encounter Provider: RACHELLE Ospina  Encounter Date: 3/31/2025   Encounter department: [facility]  :  Assessment & Plan  Strain of right groin    Orders:    Ambulatory Referral to Physical Therapy; Future    Type 2 diabetes mellitus without complication, without long-term current use of insulin ([facility])    Lab Results   Component Value Date    HGBA1C 6.4 (H) 2025       Orders:    Hemoglobin A1C; Future    Comprehensive metabolic panel; Future    CBC and differential; Future    Hyperlipidemia LDL goal <70    Orders:    Lipid Panel with Direct LDL reflex; Future    Hypothyroidism, unspecified type    Orders:    TSH, 3rd generation with Free T4 reflex; Future    Vitamin D deficiency    Orders:    Vitamin D 25 hydroxy; Future          Depression Screening and Follow-up Plan: Patient was screened for depression during today's encounter. They screened negative with a PHQ-2 score of 1.        History of Present Illness   Here for 6 month Diabetes medcheck/lab review. HgA1c is improved to 6.4, is taking Ozempic 2mg weekly.   C/o pain in her right groin since doing yard work 2 weeks ago.         Review of Systems   Constitutional:  Negative for activity change, diaphoresis, fatigue and fever.   HENT:  Positive for congestion (chronic), postnasal drip (chronic) and rhinorrhea (chronic). Negative for facial swelling, hearing loss, sinus pressure, sinus pain, sneezing, sore throat and voice change.    Eyes:  Negative for discharge and visual disturbance.   Respiratory:  Negative for cough, choking, chest tightness, shortness of breath, wheezing and stridor.    Cardiovascular:  Negative for chest pain, palpitations and leg swelling.   Gastrointestinal:  Negative for abdominal distention, abdominal pain, constipation, diarrhea, nausea and vomiting.   Endocrine: Negative for polydipsia, polyphagia and polyuria.   Genitourinary:  Negative  "for difficulty urinating, dysuria, frequency and urgency.   Musculoskeletal:  Positive for arthralgias. Negative for neck pain and neck stiffness.   Skin:  Negative for color change, rash and wound.   Neurological:  Negative for dizziness, syncope, speech difficulty, weakness, light-headedness and headaches.   Hematological:  Negative for adenopathy. Does not bruise/bleed easily.   Psychiatric/Behavioral:  Negative for agitation, behavioral problems, confusion, hallucinations, sleep disturbance and suicidal ideas. The patient is not nervous/anxious.        Objective   /78   Pulse 78   Temp 98.4 °F (36.9 °C)   Resp 18   Ht 5' 6\" (1.676 m)   Wt 134 kg (296 lb)   SpO2 97%   BMI 47.78 kg/m²      Physical Exam  Vitals and nursing note reviewed.   Constitutional:       General: She is not in acute distress.     Appearance: She is well-developed. She is not diaphoretic.   Neck:      Thyroid: No thyromegaly.      Trachea: No tracheal deviation.   Cardiovascular:      Rate and Rhythm: Normal rate and regular rhythm.      Pulses: no weak pulses.           Dorsalis pedis pulses are 2+ on the right side and 2+ on the left side.      Heart sounds: Normal heart sounds. No murmur heard.  Pulmonary:      Effort: Pulmonary effort is normal. No respiratory distress.      Breath sounds: Normal breath sounds. No wheezing.   Musculoskeletal:         General: Tenderness (right groin with internal and external rotation) present. No deformity. Normal range of motion.      Cervical back: Normal range of motion and neck supple.   Feet:      Right foot:      Skin integrity: No ulcer, skin breakdown, erythema, warmth, callus or dry skin.      Left foot:      Skin integrity: No ulcer, skin breakdown, erythema, warmth, callus or dry skin.   Lymphadenopathy:      Cervical: No cervical adenopathy.   Skin:     General: Skin is warm and dry.      Findings: No erythema or rash.   Neurological:      Mental Status: She is alert and " oriented to person, place, and time.   Psychiatric:         Mood and Affect: Mood normal.         Behavior: Behavior normal. Behavior is cooperative.         Thought Content: Thought content normal.         Judgment: Judgment normal.         Patient's shoes and socks removed.    Right Foot/Ankle   Right Foot Inspection  Skin Exam: skin normal and skin intact. No dry skin, no warmth, no callus, no erythema, no maceration, no abnormal color, no pre-ulcer, no ulcer and no callus.     Toe Exam: ROM and strength within normal limits.     Sensory   Monofilament testing: intact    Vascular  Capillary refills: < 3 seconds  The right DP pulse is 2+.     Left Foot/Ankle  Left Foot Inspection  Skin Exam: skin normal and skin intact. No dry skin, no warmth, no erythema, no maceration, normal color, no pre-ulcer, no ulcer and no callus.     Toe Exam: ROM and strength within normal limits.     Sensory   Monofilament testing: intact    Vascular  Capillary refills: < 3 seconds  The left DP pulse is 2+.     Assign Risk Category  No deformity present  No loss of protective sensation  No weak pulses  Risk: 0

## 2025-03-31 NOTE — ASSESSMENT & PLAN NOTE
Lab Results   Component Value Date    HGBA1C 6.4 (H) 03/22/2025       Orders:    Hemoglobin A1C; Future    Comprehensive metabolic panel; Future    CBC and differential; Future

## 2025-04-15 ENCOUNTER — OFFICE VISIT (OUTPATIENT)
Dept: FAMILY MEDICINE CLINIC | Facility: CLINIC | Age: 40
End: 2025-04-15
Payer: COMMERCIAL

## 2025-04-15 VITALS
BODY MASS INDEX: 47.09 KG/M2 | OXYGEN SATURATION: 97 % | HEIGHT: 66 IN | WEIGHT: 293 LBS | DIASTOLIC BLOOD PRESSURE: 72 MMHG | SYSTOLIC BLOOD PRESSURE: 128 MMHG | RESPIRATION RATE: 18 BRPM | HEART RATE: 80 BPM | TEMPERATURE: 98.5 F

## 2025-04-15 DIAGNOSIS — J30.89 SEASONAL ALLERGIC RHINITIS DUE TO OTHER ALLERGIC TRIGGER: ICD-10-CM

## 2025-04-15 DIAGNOSIS — J01.01 ACUTE RECURRENT MAXILLARY SINUSITIS: Primary | ICD-10-CM

## 2025-04-15 PROCEDURE — 99213 OFFICE O/P EST LOW 20 MIN: CPT | Performed by: NURSE PRACTITIONER

## 2025-04-15 RX ORDER — AZITHROMYCIN 250 MG/1
TABLET, FILM COATED ORAL
Qty: 6 TABLET | Refills: 0 | Status: SHIPPED | OUTPATIENT
Start: 2025-04-15 | End: 2025-04-19

## 2025-04-15 RX ORDER — PREDNISONE 20 MG/1
20 TABLET ORAL 2 TIMES DAILY WITH MEALS
Qty: 10 TABLET | Refills: 0 | Status: SHIPPED | OUTPATIENT
Start: 2025-04-15 | End: 2025-04-20

## 2025-04-15 NOTE — PROGRESS NOTES
"Name: Ines Solares      : 1985      MRN: [id number]  Encounter Provider: RACHELLE Ospina  Encounter Date: 4/15/2025   Encounter department: [facility]  :  Assessment & Plan  Acute recurrent maxillary sinusitis    Orders:    azithromycin (ZITHROMAX) 250 mg tablet; Take 2 tablets today then 1 tablet daily x 4 days    predniSONE 20 mg tablet; Take 1 tablet (20 mg total) by mouth 2 (two) times a day with meals for 5 days    Seasonal allergic rhinitis due to other allergic trigger               Depression Screening and Follow-up Plan: Patient was screened for depression during today's encounter. They screened negative with a PHQ-2 score of 0.        History of Present Illness   Sinus congestion worse on left side    Sore Throat   This is a new problem. The current episode started in the past 7 days. The problem has been waxing and waning. Neither side of throat is experiencing more pain than the other. The pain is at a severity of 2/10. The patient is experiencing no pain. Associated symptoms include abdominal pain, congestion, coughing, ear pain, headaches, a plugged ear sensation and shortness of breath. Pertinent negatives include no diarrhea, drooling, ear discharge, hoarse voice, neck pain, stridor, swollen glands, trouble swallowing or vomiting.   Headache  Cough  Associated symptoms include ear pain, headaches, a sore throat and shortness of breath.     Review of Systems   HENT:  Positive for congestion, ear pain and sore throat. Negative for drooling, ear discharge, hoarse voice and trouble swallowing.    Respiratory:  Positive for cough and shortness of breath. Negative for stridor.    Gastrointestinal:  Positive for abdominal pain. Negative for diarrhea and vomiting.   Musculoskeletal:  Negative for neck pain.   Neurological:  Positive for headaches.       Objective   /72   Pulse 80   Temp 98.5 °F (36.9 °C)   Resp 18   Ht 5' 6\" (1.676 m)   Wt 135 kg (298 lb)   " SpO2 97%   BMI 48.10 kg/m²      Physical Exam  Vitals and nursing note reviewed.   Constitutional:       General: She is not in acute distress.     Appearance: She is well-developed. She is not diaphoretic.   HENT:      Head: Normocephalic and atraumatic.      Right Ear: Tympanic membrane, ear canal and external ear normal.      Left Ear: Tympanic membrane, ear canal and external ear normal.      Nose: Congestion and rhinorrhea present.      Mouth/Throat:      Mouth: Mucous membranes are moist.      Pharynx: Uvula midline. Oropharyngeal exudate present.   Eyes:      General:         Right eye: No discharge.         Left eye: No discharge.      Conjunctiva/sclera: Conjunctivae normal.   Neck:      Thyroid: No thyromegaly.   Cardiovascular:      Rate and Rhythm: Normal rate and regular rhythm.      Heart sounds: Normal heart sounds. No murmur heard.     No friction rub. No gallop.   Pulmonary:      Effort: Pulmonary effort is normal. No respiratory distress.      Breath sounds: Normal breath sounds. No wheezing or rales.   Musculoskeletal:         General: No tenderness or deformity. Normal range of motion.      Cervical back: Normal range of motion and neck supple.   Skin:     General: Skin is warm and dry.      Findings: No erythema or rash.   Neurological:      Mental Status: She is alert and oriented to person, place, and time.      Cranial Nerves: No cranial nerve deficit.      Coordination: Coordination normal.   Psychiatric:         Mood and Affect: Mood normal.         Behavior: Behavior normal.         Thought Content: Thought content normal.         Judgment: Judgment normal.

## 2025-04-23 NOTE — PROGRESS NOTES
"PT Evaluation     Today's date: 25  Patient name: Ines Solares  : 1985  MRN: [id number]  Referring provider: Brooke Silveira CRNP  Dx:   Encounter Diagnosis     ICD-10-CM    1. Strain of right groin  S76.211A                      Assessment  Impairments: abnormal or restricted ROM, abnormal movement, activity intolerance, impaired physical strength, lacks appropriate home exercise program and pain with function  Symptom irritability: moderate    Assessment details: Ines Solares is a 40 y.o. female presenting to outpatient physical therapy with noted impairments including pain, impaired soft tissue mobility, reduced range of motion, reduced strength, reduced postural awareness, and reduced activity tolerance. Signs and symptoms at present are consistent with referring diagnosis of R groin strain. Due to noted impairments, the patient's present functional limitations include difficulty with ADLs with increased need for assistance, reliance on medication and/or modalities for pain relief, reduced tolerance for functional mobility and activity, and difficulty completing self care and HH responsibilities. Patient to benefit from skilled outpatient physical therapy 2x/week for 6 weeks in order to reduce pain, maximize pain free range of motion, increase strength and stability, and improve functional mobility/functional activity in order to maximize return to prior level of function with reduced limitations. Home exercise program was provided and all questions answered to patient's level of satisfaction. Thank you for your referral.        Understanding of Dx/Px/POC: good     Prognosis: good    Goals  STGs to be achieved in 4 weeks:  1. Pt to demonstrate reduced subjective pain rating \"at worst\" by at least 2-3 points from Initial Eval in order to allow for reduced pain with ADLs and improved functional activity tolerance.   2. Pt to demonstrate increased AROM of R hip abd by at least 5-10 degrees in order to " allow for greater ease and independence with ADLs and functional mobility  4. Pt to demonstrate increased MMT of R hip flex and abd by at least 1/2-1 grade in order to improve safety and stability with ADLs and functional mobility.     LTGs to be achieved in 6-8 weeks:  1. Pt will be I with HEP in order to continue to improve quality of life and independence and reduce risk for re-injury.   2. Pt to demonstrate return to driving and sleeping without limitations or restrictions.   3. Pt to demonstrate improved function as noted by achieving or exceeding predicted score on FOTO outcomes assessment tool.       Plan  Patient would benefit from: skilled physical therapy  Other planned modality interventions: Modalities prn for symptom management    Planned therapy interventions: manual therapy, neuromuscular re-education, therapeutic activities, therapeutic exercise, strengthening, stretching and home exercise program    Frequency: 2x week  Duration in weeks: 6  Plan of Care beginning date: 2025  Plan of Care expiration date: 2025  Treatment plan discussed with: PTA and patient    Subjective Evaluation    History of Present Illness  Mechanism of injury: Pt states she felt a pop in her R groin when trying to sit down on outside steps approx 1 month ago. Began having pain with certain mvmts and with changing positions in bed, also while driving. Pain is not constant, just with certain mvmts. Pt notes diff driving, bending, moving in bed, putting pressure thru RLE when getting up. No pain with amb. Pain with entering/exiting vehicle.  Pt notes pain has improved from initial onset.          Not a recurrent problem   Patient Goals  Patient goals for therapy: decreased pain, increased motion and increased strength  Patient goal: exercise w/o pain  Pain  Current pain ratin  At best pain ratin  At worst pain ratin  Quality: pulling  Relieving factors: change in position and medications (tylenol when  aggravated)  Exacerbated by: bending, certain ways, certain mvmts, driving.  Progression: improved    Social Support  Steps to enter house: yes  3  Stairs in house: yes   Lives in: multiple-level home  Lives with: spouse and adult children    Employment status: not working  Hand dominance: right      Diagnostic Tests  No diagnostic tests performed  Treatments  Current treatment: physical therapy      Objective     Observations     Additional Observation Details  Obese female    Palpation     Right   Tenderness of the adductor brevis, adductor longus and adductor gi.     Lumbar Screen  Lumbar range of motion within normal limits.    Active Range of Motion     Right Hip   Flexion: 90 degrees   Abduction: 25 degrees   External rotation (90/90): 33 degrees   Internal rotation (90/90): 50 degrees     Strength/Myotome Testing     Right Hip   Planes of Motion   Flexion: 4 (produced pain)  Abduction: 5  Adduction: 4 (pain)    Additional Strength Details  Knee ext /flex  R = 5/5             Precautions: DM 2, HTN, GERD    Re-eval Date:     Date 4/25       Visit Count 1       FOTO Comp 4/25       Pain In        Pain Out              Manuals 4/25                                       Neuro Re-Ed        Side step on aeromat        Monster walks                                                Ther Ex        Nustep L1  10'       Ham stretch        Supine BKFO with stretch        SLRs         Add squeeze w/ball        Bridges w/ball        Leg press        Standing 4 way SLR                                        Ther Activity                        Gait Training                        Modalities

## 2025-04-24 ENCOUNTER — TRANSCRIBE ORDERS (OUTPATIENT)
Dept: FAMILY MEDICINE CLINIC | Facility: CLINIC | Age: 40
End: 2025-04-24

## 2025-04-24 DIAGNOSIS — J30.9 ALLERGIC RHINITIS, UNSPECIFIED SEASONALITY, UNSPECIFIED TRIGGER: Primary | ICD-10-CM

## 2025-04-25 ENCOUNTER — EVALUATION (OUTPATIENT)
Dept: PHYSICAL THERAPY | Facility: CLINIC | Age: 40
End: 2025-04-25
Payer: COMMERCIAL

## 2025-04-25 DIAGNOSIS — S76.211A STRAIN OF RIGHT GROIN: Primary | ICD-10-CM

## 2025-04-25 PROCEDURE — 97110 THERAPEUTIC EXERCISES: CPT | Performed by: PHYSICAL MEDICINE & REHABILITATION

## 2025-04-25 PROCEDURE — 97161 PT EVAL LOW COMPLEX 20 MIN: CPT | Performed by: PHYSICAL MEDICINE & REHABILITATION

## 2025-04-30 ENCOUNTER — OFFICE VISIT (OUTPATIENT)
Dept: PHYSICAL THERAPY | Facility: CLINIC | Age: 40
End: 2025-04-30
Attending: NURSE PRACTITIONER
Payer: COMMERCIAL

## 2025-04-30 DIAGNOSIS — S76.211A STRAIN OF RIGHT GROIN: Primary | ICD-10-CM

## 2025-04-30 PROCEDURE — 97110 THERAPEUTIC EXERCISES: CPT

## 2025-04-30 NOTE — PROGRESS NOTES
"Daily Note     Today's date: 2025  Patient name: Ines Solares  : 1985  MRN: [id number]  Referring provider: Brooke Silveira CRNP  Dx:   Encounter Diagnosis     ICD-10-CM    1. Strain of right groin  S76.211A                      Subjective:  Pt. states  R Groin region is tight and painful today.       Objective: See treatment diary below      Assessment: Tolerated treatment Fairly Well overall. Good benefits from stretching and MHP applic.  Patient would benefit from continued PT.      Plan: Continue per plan of care.            Precautions: DM 2, HTN, GERD    Re-eval Date:     Date       Visit Count 1 2      FOTO Comp        Pain In  R Groin  tight and painful      Pain Out  Less sx               Manuals                                        Neuro Re-Ed        Side step on aeromat  *NV      Monster walks                                                Ther Ex  4.30      Nustep L1  10' L1  10'  *MHP to R groin      Ham stretch  **R 3x/20-30\"  Supine w/rope assist      Supine BKFO with stretch  **1x10/5\"      SLRs   **R 1x10         Add squeeze w/ball  **20x/5\"      Bridges w/ball        Leg press  *NV      Standing 4 way SLR  *NV        **Groin stretch R 4x/20\"                              Ther Activity                        Gait Training                        Modalities  4.30        **MHP to R Groin   10 min    No adverse reaction                   "

## 2025-05-01 ENCOUNTER — OFFICE VISIT (OUTPATIENT)
Dept: PHYSICAL THERAPY | Facility: CLINIC | Age: 40
End: 2025-05-01
Attending: NURSE PRACTITIONER
Payer: COMMERCIAL

## 2025-05-01 DIAGNOSIS — S76.211A STRAIN OF RIGHT GROIN: Primary | ICD-10-CM

## 2025-05-01 PROCEDURE — 97110 THERAPEUTIC EXERCISES: CPT | Performed by: PHYSICAL MEDICINE & REHABILITATION

## 2025-05-01 NOTE — PROGRESS NOTES
"Daily Note     Today's date: 2025  Patient name: Ines Solares  : 1985  MRN: [id number]  Referring provider: Brooke Silveira CRNP  Dx:   Encounter Diagnosis     ICD-10-CM    1. Strain of right groin  S76.211A                      Subjective: Pt notes she has some soreness R groin but not that bad.      Objective: See treatment diary below      Assessment: Tolerated treatment fairly well. Patient exhibited good technique with therapeutic exercises and would benefit from continued PT  Pt did note some diff with monster walks , therefore adjusted technique to increase ease for pt.      Plan: Continue per plan of care.      Precautions: DM 2, HTN, GERD    Re-eval Date:     Date      Visit Count 1 2 3     FOTO Comp        Pain In  R Groin  tight and painful      Pain Out  Less sx               Manuals 4/25  5/3                                     Neuro Re-Ed        Side step on aeromat  *NV      Monster walks   30' x 2                                             Ther Ex  4.30      Nustep L1  10' L1  10'  *MHP to R groin L2  10'  MHP to R   groin     Ham stretch  **R 3x/20-30\"  Supine w/rope assist R longseated  3 x 30\"     Supine BKFO with stretch  **1x10/5\" 10 x 5\"     SLRs   **R 1x10    15x     Add squeeze w/ball  **20x/5\" 20 x 5\"     Bridges w/ball   15 x 3\"     Leg press  *NV 70#  30x     Standing 4 way SLR  *NV 20x ea R     Groin stretch  **Groin stretch R 4x/20\" 4 x 20\"     1/2 squat-wide stance   20x                                             Ther Activity                        Gait Training                        Modalities  4.30        **MHP to R Groin   10 min    No adverse reaction                     "

## 2025-05-06 ENCOUNTER — OFFICE VISIT (OUTPATIENT)
Dept: PHYSICAL THERAPY | Facility: CLINIC | Age: 40
End: 2025-05-06
Attending: NURSE PRACTITIONER
Payer: COMMERCIAL

## 2025-05-06 DIAGNOSIS — S76.211A STRAIN OF RIGHT GROIN: Primary | ICD-10-CM

## 2025-05-06 PROCEDURE — 97110 THERAPEUTIC EXERCISES: CPT

## 2025-05-06 NOTE — PROGRESS NOTES
"Daily Note     Today's date: 2025  Patient name: Ines Solares  : 1985  MRN: [id number]  Referring provider: Brooke Silveira CRNP  Dx:   Encounter Diagnosis     ICD-10-CM    1. Strain of right groin  S76.211A                      Subjective:  R groin region is painful at this present time.  Rates pain level to = \"5-6\"/10.  Feels like a tight pressure, as per pt feedback.       Objective: See treatment diary below      Assessment: Tolerated treatment  Fairly Well overall . *Held on Monster walk exer today, as pt did not amor well @ last treatment  session.  Patient exhibited good technique with therapeutic exercises and would benefit from continued PT.  Less pain/sx by end of session, and after MHP applic.     Plan: Continue per plan of care.              Precautions: DM 2, HTN, GERD    Re-eval Date:     Date  5 5.6    Visit Count 1 2 3 4    FOTO Comp        Pain In  R Groin  tight and painful  R Groin  tight and painful  \"5-6\"/10    Pain Out  Less sx     Less pain/sx          Manuals   5/3                                     Neuro Re-Ed    5.6    Side step on aeromat  *NV  **FIRM   3 laps along bar @ mirror    Monster walks   30' x 2 *Held                                             Ther Ex    5.6    Nustep L1  10' L1  10'  *MHP to R groin L2  10'  MHP to R   groin L2  10'    Ham stretch  **R 3x/20-30\"  Supine w/rope assist R longseated  3 x 30\" R longseated  3 x 30\"    Supine BKFO with stretch  **1x10/5\" 10 x 5\" 15 x 5\"    SLRs   **R 1x10    15x 3x5    Add squeeze w/ball  **20x/5\" 20 x 5\" 25 x 5\"    Bridges w/ball   15 x 3\" 15 x 3\"    Leg press  *NV 70#  30x   70#  30x    Standing 4 way SLR  *NV 20x ea R 20x ea R    Groin stretch  **Groin stretch R 4x/20\" 4 x 20\" 4 x 20\"    1/2 squat-wide stance   20x 20x                                            Ther Activity                        Gait Training                        Modalities  4.30  5.6      **MHP to R Isidro   10 min    No " adverse reaction  MHP to R Groin   10 min    No adverse reaction

## 2025-05-07 ENCOUNTER — OFFICE VISIT (OUTPATIENT)
Dept: PHYSICAL THERAPY | Facility: CLINIC | Age: 40
End: 2025-05-07
Attending: NURSE PRACTITIONER
Payer: COMMERCIAL

## 2025-05-07 DIAGNOSIS — S76.211A STRAIN OF RIGHT GROIN: Primary | ICD-10-CM

## 2025-05-07 PROCEDURE — 97110 THERAPEUTIC EXERCISES: CPT

## 2025-05-07 NOTE — PROGRESS NOTES
"Daily Note     Today's date: 2025  Patient name: Ines Solares  : 1985  MRN: [id number]  Referring provider: Brooke Silveira CRNP  Dx:   Encounter Diagnosis     ICD-10-CM    1. Strain of right groin  S76.211A                      Subjective:  Pt. States her R groin is \"really hurting right now\".       Objective: See treatment diary below      Assessment: Tolerated treatment  Fairly Well overall.  No neg change in sx with exer performed. Good benefits from MHP applic.  Patient would benefit from continued PT.      Plan: Continue per plan of care.            Precautions: DM 2, HTN, GERD    Re-eval Date:     Date  5.6 5.7   Visit Count 1 2 3 4 5   FOTO Comp        Pain In  R Groin  tight and painful  R Groin  tight and painful  \"5-6\"/10 R Groin painful  \"5-6\"/10   Pain Out  Less sx     Less pain/sx Less pain/sx post MHP applic         Manuals   5/3                                     Neuro Re-Ed    5.6 5.7   Side step on aeromat  *NV  **FIRM   3 laps along bar @ mirror FIRM   3 laps along bar @ mirror   Monster walks   30' x 2 *Held                                             Ther Ex  .  5.6 5.7   Nustep L1  10' L1  10'  *MHP to R groin L2  10'  MHP to R   groin L2  10' L2  10'   Ham stretch  **R 3x/20-30\"  Supine w/rope assist R longseated  3 x 30\" R longseated  3 x 30\" R longseated  3 x 30\"   Supine BKFO with stretch  **1x10/5\" 10 x 5\" 15 x 5\" 15 x 5\"   SLRs   **R 1x10    15x 25 x 5\" 25 x    Add squeeze w/ball  **20x/5\" 20 x 5\" 25 x 5\" 25 x 5\"   Bridges w/ball   15 x 3\" 15 x 3\" 2x10 x 3\"   Leg press  *NV 70#  30x   70#  30x 75#  40x   Standing 4 way SLR  *NV 20x ea R 20x ea R 25x ea R   Groin stretch  **Groin stretch R 4x/20\" 4 x 20\" 4 x 20\" 4 x 20\"   1/2 squat-wide stance   20x 20x 25x        **Hip flex/groin stretch w/strap assist 3x/10\"                                   Ther Activity                        Gait Training                        Modalities  4.30  5.6 5.7     **MHP " to R Groin   10 min    No adverse reaction  MHP to R Groin   10 min    No adverse reaction MHP to R Groin   10 min    No adverse reaction

## 2025-05-13 ENCOUNTER — OFFICE VISIT (OUTPATIENT)
Dept: PHYSICAL THERAPY | Facility: CLINIC | Age: 40
End: 2025-05-13
Attending: NURSE PRACTITIONER
Payer: COMMERCIAL

## 2025-05-13 DIAGNOSIS — S76.211A STRAIN OF RIGHT GROIN: Primary | ICD-10-CM

## 2025-05-13 PROCEDURE — 97110 THERAPEUTIC EXERCISES: CPT

## 2025-05-13 NOTE — PROGRESS NOTES
"Daily Note     Today's date: 2025  Patient name: Ines Solares  : 1985  MRN: [id number]  Referring provider: Brooke Silveira CRNP  Dx:   Encounter Diagnosis     ICD-10-CM    1. Strain of right groin  S76.211A                      Subjective:  Pt. states R groin region is feeling better, however R knee has been bothering her the last few days with described sx as \"pressure\", 1* with knee flex and WBing.  Also notes R hamstring tightness/soreness.  Says she did alot of grass mowing at her house, which may be the cause of her R LE sx.       Objective: See treatment diary below      Assessment: Tolerated treatment Fairly Well/ well overall. Patient would benefit from continued PT.  Good benefits from stretching and MHP applic.       Plan: Continue per plan of care.            Precautions: DM 2, HTN, GERD    Re-eval Date:     Date  5.6 5.7   Visit Count 6 2 3 4 5   FOTO Comp        Pain In < R groin sx    R knee \"pressure\"  R hamstring tightness/soreness. R Groin  tight and painful  R Groin  tight and painful  \"5-6\"/10 R Groin painful  \"5-6\"/10   Pain Out No neg changes Less sx     Less pain/sx Less pain/sx post MHP applic         Manuals   5/3                                     Neuro Re-Ed 5.13   5.6 5.7   Side step on aeromat FIRM   4 laps along bar @ mirror *NV  **FIRM   3 laps along bar @ mirror FIRM   3 laps along bar @ mirror   Monster walks   30' x 2 *Held                                             Ther Ex 5. 4.30  5.6 5.7   Nustep L3  10' L1  10'  *MHP to R groin L2  10'  MHP to R   groin L2  10' L2  10'   Ham stretch R longseated  4 x 30\" **R 3x/20-30\"  Supine w/rope assist R longseated  3 x 30\" R longseated  3 x 30\" R longseated  3 x 30\"   Supine BKFO with stretch 20 x 5\" **1x10/5\" 10 x 5\" 15 x 5\" 15 x 5\"   SLRs  3x10 **R 1x10    15x 25 x 5\" 25 x    Add squeeze w/ball 30 x 5\" **20x/5\" 20 x 5\" 25 x 5\" 25 x 5\"   Bridges w/ball 2x10 x 3\"  15 x 3\" 15 x 3\" 2x10 x 3\"   Leg press 75#  " "40x *NV 70#  30x   70#  30x 75#  40x   Standing 4 way SLR 25x ea R *NV 20x ea R 20x ea R 25x ea R   Groin stretch 4 x 20\" **Groin stretch R 4x/20\" 4 x 20\" 4 x 20\" 4 x 20\"   1/2 squat-wide stance *hold this visit  20x 20x 25x    Hip flex/groin stretch w/strap assist 3x/10\"    **Hip flex/groin stretch w/strap assist 3x/10\"                                   Ther Activity                        Gait Training                        Modalities 5.13 4.30  5.6 5.7    MHP to R Groin   10 min   **MHP to R Groin   10 min    No adverse reaction  MHP to R Groin   10 min    No adverse reaction MHP to R Groin   10 min    No adverse reaction                        "

## 2025-05-15 ENCOUNTER — APPOINTMENT (OUTPATIENT)
Dept: PHYSICAL THERAPY | Facility: CLINIC | Age: 40
End: 2025-05-15
Attending: NURSE PRACTITIONER
Payer: COMMERCIAL

## 2025-05-20 ENCOUNTER — OFFICE VISIT (OUTPATIENT)
Dept: PHYSICAL THERAPY | Facility: CLINIC | Age: 40
End: 2025-05-20
Attending: NURSE PRACTITIONER
Payer: COMMERCIAL

## 2025-05-20 DIAGNOSIS — S76.211A STRAIN OF RIGHT GROIN: Primary | ICD-10-CM

## 2025-05-20 PROCEDURE — 97110 THERAPEUTIC EXERCISES: CPT | Performed by: PHYSICAL MEDICINE & REHABILITATION

## 2025-05-20 NOTE — PROGRESS NOTES
"Daily Note     Today's date: 2025  Patient name: Ines Solares  : 1985  MRN: [id number]  Referring provider: Brooke Silveira CRNP  Dx:   Encounter Diagnosis     ICD-10-CM    1. Strain of right groin  S76.211A                      Subjective: Pt states she is doing a little better.  Notes she push mowed her lawn on a steep hill last week and aggravated her groin pull for 3-4 days. States it has calmed down since then.      Objective: See treatment diary below      Assessment: Tolerated treatment well. Patient exhibited good technique with therapeutic exercises and would benefit from continued PT      Plan: Continue per plan of care.      Precautions: DM 2, HTN, GERD    Re-eval Date:     Date  5.6 5.7   Visit Count 6 7 3 4 5   FOTO  Comp       Pain In < R groin sx    R knee \"pressure\"  R hamstring tightness/soreness.  -2/10  \"Not bad\"  R Groin  tight and painful  \"5-6\"/10 R Groin painful  \"-\"/10   Pain Out No neg changes Less sx     Less pain/sx Less pain/sx post MHP applic         Manuals   5/3                                     Neuro Re-Ed .  5.6 5.7   Side step on aeromat FIRM   4 laps along bar @ mirror Aeromat  4 laps  **FIRM   3 laps along bar @ mirror FIRM   3 laps along bar @ mirror   Monster walks   30' x 2 *Held                                             Ther Ex 5.13   5.6 5.7   Nustep L3  10' L3  10'   L2  10'  MHP to R   groin L2  10' L2  10'   Ham stretch R longseated  4 x 30\" R longseated  4 x 30\" R longseated  3 x 30\" R longseated  3 x 30\" R longseated  3 x 30\"   Supine BKFO with stretch 20 x 5\" 20 x 5\" 10 x 5\" 15 x 5\" 15 x 5\"   SLRs  3x10 R 30x   15x 25 x 5\" 25 x    Add squeeze w/ball 30 x 5\" 30x5\" 20 x 5\" 25 x 5\" 25 x 5\"   Bridges w/ball 2x10 x 3\" 20 x 5\" 15 x 3\" 15 x 3\" 2x10 x 3\"   Leg press 75#  40x 85#  30x 70#  30x   70#  30x 75#  40x   Standing 4 way SLR 25x ea R 25x ea R 20x ea R 20x ea R 25x ea R   Groin stretch 4 x 20\"  4x/20\" 4 x 20\" 4 x 20\" 4 x " "20\"   1/2 squat-wide stance *hold this visit 20x 20x 20x 25x   Hip flexor, groin stretch Hip flex/groin stretch w/strap assist 3x/10\" Hip flex/groin stretch w/strap assist 3x/20\"   **Hip flex/groin stretch w/strap assist 3x/10\"                                   Ther Activity                        Gait Training                        Modalities 5.13  5/20  5.6 5.7    MHP to R Groin   10 min   declined  MHP to R Groin   10 min    No adverse reaction MHP to R Groin   10 min    No adverse reaction                          "

## 2025-05-22 ENCOUNTER — OFFICE VISIT (OUTPATIENT)
Dept: PHYSICAL THERAPY | Facility: CLINIC | Age: 40
End: 2025-05-22
Attending: NURSE PRACTITIONER
Payer: COMMERCIAL

## 2025-05-22 DIAGNOSIS — S76.211A STRAIN OF RIGHT GROIN: Primary | ICD-10-CM

## 2025-05-22 PROCEDURE — 97110 THERAPEUTIC EXERCISES: CPT

## 2025-05-22 NOTE — PROGRESS NOTES
"Daily Note     Today's date: 2025  Patient name: Ines Solares  : 1985  MRN: [id number]  Referring provider: Brooke Silveira CRNP  Dx:   Encounter Diagnosis     ICD-10-CM    1. Strain of right groin  S76.211A                      Subjective:   Pt. reports current pain level = 1-2/10 @ R groin.       Objective: See treatment diary below      Assessment: Tolerated treatment well.  Patient exhibited good technique with therapeutic exercises and would benefit from continued PT.  Less pain/sx noted since SOC.       Plan: Continue per plan of care.            Precautions: DM 2, HTN, GERD    Re-eval Date:     Date  5.22 5.6 5.7   Visit Count 6 7 8 4 5   FOTO  Comp       Pain In < R groin sx    R knee \"pressure\"  R hamstring tightness/soreness.  1-2/10  \"Not bad\" 1-2/10   R groin  R Groin  tight and painful  \"5-6\"10 R Groin painful  \"5-6\"/10   Pain Out No neg changes Less sx    No neg changes Less pain/sx Less pain/sx post MHP applic         Manuals                                        Neuro Re-Ed . 5.22 5.6 5.7   Side step on aeromat FIRM   4 laps along bar @ mirror Aeromat  4 laps Aeromat  5 laps **FIRM   3 laps along bar @ mirror FIRM   3 laps along bar @ mirror   Monster walks    *Held                                             Ther Ex .  5.22 5.6 5.7   Nustep L3  10' L3  10'   L3 10'      L2  10' L2  10'   Ham stretch R longseated  4 x 30\" R longseated  4 x 30\" R longseated  4 x 30\" R longseated  3 x 30\" R longseated  3 x 30\"   Supine BKFO with stretch 20 x 5\" 20 x 5\" 25 x 5\" 15 x 5\" 15 x 5\"   SLRs  3x10 R 30x   4x10 25 x 5\" 25 x    Add squeeze w/ball 30 x 5\" 30x5\" 40 x 5\" 25 x 5\" 25 x 5\"   Bridges w/ball 2x10 x 3\" 20 x 5\" 25 x 3\" 15 x 3\" 2x10 x 3\"   Leg press 75#  40x 85#  30x 85#  30x   70#  30x 75#  40x   Standing 4 way SLR 25x ea R 25x ea R 30x ea R 20x ea R 25x ea R   Groin stretch 4 x 20\"  4x/20\" 4 x 20\" 4 x 20\" 4 x 20\"   1/2 squat-wide stance *hold this visit 20x 20x " "20x 25x   Hip flexor, groin stretch Hip flex/groin stretch w/strap assist 3x/10\" Hip flex/groin stretch w/strap assist 3x/20\" Hip flex/groin stretch w/strap assist 3x/20\"  **Hip flex/groin stretch w/strap assist 3x/10\"                                   Ther Activity                        Gait Training                        Modalities 5.13  5/20 5.22 5.6 5.7    MHP to R Groin   10 min   declined MHP to R Groin   10 min MHP to R Groin   10 min    No adverse reaction MHP to R Groin   10 min    No adverse reaction                            "

## 2025-05-29 ENCOUNTER — OFFICE VISIT (OUTPATIENT)
Dept: PHYSICAL THERAPY | Facility: CLINIC | Age: 40
End: 2025-05-29
Attending: NURSE PRACTITIONER
Payer: COMMERCIAL

## 2025-05-29 DIAGNOSIS — S76.211A STRAIN OF RIGHT GROIN: Primary | ICD-10-CM

## 2025-05-29 PROCEDURE — 97110 THERAPEUTIC EXERCISES: CPT

## 2025-05-29 NOTE — PROGRESS NOTES
"Daily Note     Today's date: 2025  Patient name: Ines Solares  : 1985  MRN: [id number]  Referring provider: Brooke Silveira CRNP  Dx:   Encounter Diagnosis     ICD-10-CM    1. Strain of right groin  S76.211A           Start Time: 1300  Stop Time: 1345  Total time in clinic (min): 45 minutes    Subjective: Patient reports that she is having a little more pain in her groin area.  Patient rates pain as 3-4/10.  Patient states she was mowing the grass over the weekend, and her groin area hurts more.      Objective: See treatment diary below      Assessment: Tolerated treatment well.   Patient participated in skilled PT session focused on strengthening, stretching, and ROM.  Patient able to complete exercise program with a mild increase in pain.  Patient had difficulty this session with complete hip add with ball due to increased pain.  Patient would continue to benefit from skilled PT interventions to address strengthening, stretching, and ROM. Patient demonstrated fatigue post treatment      Plan: Continue per plan of care.      Precautions: DM 2, HTN, GERD    Re-eval Date:     Date  5. 5.7   Visit Count 6 7 8 9 5   FOTO  Comp       Pain In < R groin sx    R knee \"pressure\"  R hamstring tightness/soreness.  1-2/10  \"Not bad\" 1-2/10   R groin  3-4 R Groin painful  \"5-6\"/10   Pain Out No neg changes Less sx    No neg changes Increased pain Less pain/sx post MHP applic         Manuals                                      Neuro Re-Ed 5. 5. 5.7   Side step on aeromat FIRM   4 laps along bar @ mirror Aeromat  4 laps Aeromat  5 laps Aeromat x 6 laps FIRM   3 laps along bar @ mirror   Monster walks                                                Ther Ex 5.. 5.7   Nustep L3  10' L3  10'   L3 10'      L3 x 10' L2  10'   Ham stretch R longseated  4 x 30\" R longseated  4 x 30\" R longseated  4 x 30\" R long seated 30\" 4x R longseated  3 x 30\"   Supine BKFO with " "stretch 20 x 5\" 20 x 5\" 25 x 5\" 5\" 25x 15 x 5\"   SLRs  3x10 R 30x   4x10 4x10 25 x    Add squeeze w/ball 30 x 5\" 30x5\" 40 x 5\" 5\" 340x 25 x 5\"   Bridges w/ball 2x10 x 3\" 20 x 5\" 25 x 3\" 3\" 25x 2x10 x 3\"   Leg press 75#  40x 85#  30x 85#  30x    75#  40x   Standing 4 way SLR 25x ea R 25x ea R 30x ea R R 30x ea 25x ea R   Groin stretch 4 x 20\"  4x/20\" 4 x 20\" NV 4 x 20\"   1/2 squat-wide stance *hold this visit 20x 20x 20x 25x   Hip flexor, groin stretch Hip flex/groin stretch w/strap assist 3x/10\" Hip flex/groin stretch w/strap assist 3x/20\" Hip flex/groin stretch w/strap assist 3x/20\" NV **Hip flex/groin stretch w/strap assist 3x/10\"                                   Ther Activity                        Gait Training                        Modalities 5.13  5/20 5.22 5/29 5.7    MHP to R Groin   10 min   declined MHP to R Groin   10 min MHP to R groin 10 min MHP to R Groin   10 min    No adverse reaction                              "

## 2025-05-30 DIAGNOSIS — K21.00 GASTROESOPHAGEAL REFLUX DISEASE WITH ESOPHAGITIS WITHOUT HEMORRHAGE: ICD-10-CM

## 2025-05-30 DIAGNOSIS — F41.9 ANXIETY: ICD-10-CM

## 2025-05-30 RX ORDER — ESCITALOPRAM OXALATE 20 MG/1
20 TABLET ORAL DAILY
Qty: 90 TABLET | Refills: 0 | Status: SHIPPED | OUTPATIENT
Start: 2025-05-30

## 2025-05-30 RX ORDER — FAMOTIDINE 20 MG/1
20 TABLET, FILM COATED ORAL 2 TIMES DAILY
Qty: 180 TABLET | Refills: 0 | Status: SHIPPED | OUTPATIENT
Start: 2025-05-30

## 2025-06-02 DIAGNOSIS — G47.33 OBSTRUCTIVE SLEEP APNEA TREATED WITH CONTINUOUS POSITIVE AIRWAY PRESSURE (CPAP): ICD-10-CM

## 2025-06-02 DIAGNOSIS — E66.01 MORBID OBESITY WITH BMI OF 45.0-49.9, ADULT (HCC): ICD-10-CM

## 2025-06-02 DIAGNOSIS — E11.9 TYPE 2 DIABETES MELLITUS WITHOUT COMPLICATION, WITHOUT LONG-TERM CURRENT USE OF INSULIN (HCC): ICD-10-CM

## 2025-06-05 ENCOUNTER — EVALUATION (OUTPATIENT)
Dept: PHYSICAL THERAPY | Facility: CLINIC | Age: 40
End: 2025-06-05
Attending: NURSE PRACTITIONER
Payer: COMMERCIAL

## 2025-06-05 DIAGNOSIS — S76.211A STRAIN OF RIGHT GROIN: Primary | ICD-10-CM

## 2025-06-05 PROCEDURE — 97110 THERAPEUTIC EXERCISES: CPT | Performed by: PHYSICAL MEDICINE & REHABILITATION

## 2025-06-05 NOTE — PROGRESS NOTES
"Daily Note     Today's date: 2025  Patient name: Ines Solares  : 1985  MRN: [id number]  Referring provider: Brooke Silveira CRNP  Dx:   Encounter Diagnosis     ICD-10-CM    1. Strain of right groin  S76.211A                      Subjective: Pt notes pain level 1-2/10 R groin. Pain increased to 5/10 during ex. States she was flared up at last visit for unknown reason. States it was decreased by the next day.Notes driving seems to aggravate it. Notes improved ability to walk the dog. Has been able to weed her flowerbed.      Objective: See treatment diary below      Assessment: Tolerated treatment well. Patient exhibited good technique with therapeutic exercises and would benefit from continued PT      Plan: Continue per plan of care.      Precautions: DM 2, HTN, GERD    Re-eval Date:     Date  5. 6   Visit Count 6 7 8 9 10   FOTO  Comp       Pain In 1-2/10  510 during ex  1-2/10  \"Not bad\" 1-2/10   R groin  3-4 R Groin painful  \"5-6\"/10   Pain Out 3/10 after MHP Less sx    No neg changes Increased pain Less pain/sx post MHP applic         Manuals                                    Neuro Re-Ed      Side step on aeromat FIRM   4 laps along bar @ mirror Aeromat  4 laps Aeromat  5 laps Aeromat x 6 laps Cont NV   Monster walks                                                Ther Ex . 5.7   Nustep L3  10' L3  10'   L3 10'      L3 x 10' L2  10'   Ham stretch R longseated  4 x 30\" R longseated  4 x 30\" R longseated  4 x 30\" R long seated 30\" 4x R longseated  4 x 30\"   Supine BKFO with stretch 20 x 5\" 20 x 5\" 25 x 5\" 5\" 25x 25 x 5\"   SLRs  3x10 R 30x   4x10 4x10 1#  30x   Add squeeze w/ball 30 x 5\" 30x5\" 40 x 5\" 5\" 40x 40 x 5\"   Bridges w/ball 2x10 x 3\" 20 x 5\" 25 x 3\" 3\" 25x 25x 3\"   Leg press 75#  40x 85#  30x 85#  30x    90#  30x   Standing 4 way SLR 25x ea R 25x ea R 30x ea R R 30x ea 30x ea R   Groin stretch 4 x 20\"  4x/20\" 4 x 20\" NV 4 x 20\" " "  1/2 squat-wide stance *hold this visit 20x 20x 20x 25x   Hip flexor, groin stretch Hip flex/groin stretch w/strap assist 3x/10\" Hip flex/groin stretch w/strap assist 3x/20\" Hip flex/groin stretch w/strap assist 3x/20\" NV Hip flex/groin stretch w/strap assist 3x/20\"                                   Ther Activity                        Gait Training                        Modalities 5.13  5/20 5.22 5/29     MHP to R Groin   10 min   declined MHP to R Groin   10 min MHP to R groin 10 min MHP to R Groin   10 min    No adverse reaction                                "

## 2025-06-11 ENCOUNTER — APPOINTMENT (OUTPATIENT)
Dept: PHYSICAL THERAPY | Facility: CLINIC | Age: 40
End: 2025-06-11
Attending: NURSE PRACTITIONER
Payer: COMMERCIAL

## 2025-06-13 ENCOUNTER — OFFICE VISIT (OUTPATIENT)
Dept: PHYSICAL THERAPY | Facility: CLINIC | Age: 40
End: 2025-06-13
Attending: NURSE PRACTITIONER
Payer: COMMERCIAL

## 2025-06-13 DIAGNOSIS — E03.9 HYPOTHYROIDISM, UNSPECIFIED TYPE: ICD-10-CM

## 2025-06-13 DIAGNOSIS — K21.00 GASTROESOPHAGEAL REFLUX DISEASE WITH ESOPHAGITIS WITHOUT HEMORRHAGE: ICD-10-CM

## 2025-06-13 DIAGNOSIS — S76.211A STRAIN OF RIGHT GROIN: Primary | ICD-10-CM

## 2025-06-13 PROCEDURE — 97110 THERAPEUTIC EXERCISES: CPT

## 2025-06-13 RX ORDER — OMEPRAZOLE 20 MG/1
20 CAPSULE, DELAYED RELEASE ORAL DAILY
Qty: 90 CAPSULE | Refills: 1 | Status: SHIPPED | OUTPATIENT
Start: 2025-06-13

## 2025-06-13 RX ORDER — LEVOTHYROXINE SODIUM 200 MCG
200 TABLET ORAL DAILY
Qty: 90 TABLET | Refills: 1 | Status: SHIPPED | OUTPATIENT
Start: 2025-06-13

## 2025-06-13 NOTE — PROGRESS NOTES
"Daily Note     Today's date: 2025  Patient name: Ines Solares  : 1985  MRN: [id number]  Referring provider: Brooke Silveira CRNP  Dx:   Encounter Diagnosis     ICD-10-CM    1. Strain of right groin  S76.211A                      Subjective:  Pt. reports current pain level @ R groin = \"7\"/10, and \"5\"/10 @ R knee.      Objective: See treatment diary below      Assessment: Tolerated treatment Well with all ther exer performed.  Modifications made for select exer today, so as to not aggravate or exacerbate current sx. Less pain/sx @ R groin and knee by end of treatment session. Patient would benefit from continued PT.      Plan: Con't services 2x/week as per POC/Goals set forth.              Precautions: DM 2, HTN, GERD    Re-eval Date:     Date  5. 6   Visit Count 11 7 8 9 10   FOTO  Comp       Pain In 7/10 R groin  5/10 R knee  1-2/10  \"Not bad\" 1-2/10   R groin  3-4 R Groin painful  \"5-6\"/10   Pain Out 3-4/10 R groin  3/10 R knee Less sx    No neg changes Increased pain Less pain/sx post MHP applic         Manuals   6                                   Neuro Re-Ed 6.13  5.22     Side step on aeromat *hold this visit Aeromat  4 laps Aeromat  5 laps Aeromat x 6 laps Cont NV   Monster walks                                                Ther Ex 6.. 5.7   Nustep L2  10' L3  10'   L3 10'      L3 x 10' L2  10'   Ham stretch R longseated  4 x 30\" R longseated  4 x 30\" R longseated  4 x 30\" R long seated 30\" 4x R longseated  4 x 30\"   Supine BKFO with stretch 25 x 5\" 20 x 5\" 25 x 5\" 5\" 25x 25 x 5\"   SLRs  1x10 R R 30x   4x10 4x10 1#  30x   Add squeeze w/ball 30 x 5\" 30x5\" 40 x 5\" 5\" 40x 40 x 5\"   Bridges w/ball 25x/ 3\" 20 x 5\" 25 x 3\" 3\" 25x 25x 3\"   Leg press 90#  30x 85#  30x 85#  30x    90#  30x   Standing 4 way SLR 20x ea R 25x ea R 30x ea R R 30x ea 30x ea R   Groin stretch 4 x 20\"  4x/20\" 4 x 20\" NV 4 x 20\"   1/2 squat-wide stance *hold this visit 20x " "20x 20x 25x   Hip flexor, groin stretch Hip flex/groin stretch w/strap assist 3x/20\" Hip flex/groin stretch w/strap assist 3x/20\" Hip flex/groin stretch w/strap assist 3x/20\" NV Hip flex/groin stretch w/strap assist 3x/20\"                                   Ther Activity                        Gait Training                        Modalities 6.13  5/20 5.22 5/29     MHP to R Groin   10 min   declined MHP to R Groin   10 min MHP to R groin 10 min MHP to R Groin   10 min    No adverse reaction    **CP to R knee  10 min                              " No

## 2025-06-16 ENCOUNTER — OFFICE VISIT (OUTPATIENT)
Dept: PHYSICAL THERAPY | Facility: CLINIC | Age: 40
End: 2025-06-16
Attending: NURSE PRACTITIONER
Payer: COMMERCIAL

## 2025-06-16 DIAGNOSIS — S76.211A STRAIN OF RIGHT GROIN: Primary | ICD-10-CM

## 2025-06-16 PROCEDURE — 97110 THERAPEUTIC EXERCISES: CPT | Performed by: PHYSICAL MEDICINE & REHABILITATION

## 2025-06-16 NOTE — PROGRESS NOTES
"Daily Note     Today's date: 2025  Patient name: Ines Solares  : 1985  MRN: [id number]  Referring provider: Brooke Silveira CRNP  Dx:   Encounter Diagnosis     ICD-10-CM    1. Strain of right groin  S76.211A                      Subjective: Pt notes very little pain today rated 1/10.      Objective: See treatment diary below      Assessment: Tolerated treatment well. Patient demonstrated fatigue post treatment, exhibited good technique with therapeutic exercises, and would benefit from continued PT. Pt 's pain level has ret'd to a low level and was able to perform all ex w/o issue.    Plan: Continue per plan of care.      Precautions: DM 2, HTN, GERD    Re-eval Date:     Date  5.   Visit Count 11 12 8 9 10   FOTO  Comp       Pain In 10 R groin  5/10 R knee  1/10  \"Not bad\" 1-2/10   R groin  3-4 R Groin painful  \"-6\"/10   Pain Out 3-4/10 R groin  3/10 R knee    No neg changes Increased pain Less pain/sx post MHP applic         Manuals                                     Neuro Re-Ed 6. 5.    Side step on aeromat *hold this visit Aeromat  5 laps Aeromat  5 laps Aeromat x 6 laps Cont NV   Monster walks                                                Ther Ex 6. 5. 5.7   Nustep L2  10' L3  10'   L3 10'      L3 x 10' L2  10'   Ham stretch R longseated  4 x 30\" R longseated  4 x 30\" R longseated  4 x 30\" R long seated 30\" 4x R longseated  4 x 30\"   Supine BKFO with stretch 25 x 5\" 25 x 5\" 25 x 5\" 5\" 25x 25 x 5\"   SLRs  1x10 R R 30x   4x10 4x10 1#  30x   Add squeeze w/ball 30 x 5\" 30x5\" 40 x 5\" 5\" 40x 40 x 5\"   Bridges w/ball 25x/ 3\" 25 x 5\" 25 x 3\" 3\" 25x 25x 3\"   Leg press 90#  30x 90#  30x 85#  30x    90#  30x   Standing 4 way SLR 20x ea R 25x ea R 30x ea R R 30x ea 30x ea R   Groin stretch 4 x 20\"  4x/20\" 4 x 20\" NV 4 x 20\"   1/2 squat-wide stance *hold this visit 20x 20x 20x 25x   Hip flexor, groin stretch Hip flex/groin stretch w/strap " "assist 3x/20\" Hip flex/groin stretch w/strap assist 3x/20\" Hip flex/groin stretch w/strap assist 3x/20\" NV Hip flex/groin stretch w/strap assist 3x/20\"                                   Ther Activity                        Gait Training                        Modalities 6.13  5/20 5.22 5/29     MHP to R Groin   10 min   declined MHP to R Groin   10 min MHP to R groin 10 min MHP to R Groin   10 min    No adverse reaction    **CP to R knee  10 min                                "

## 2025-06-18 ENCOUNTER — OFFICE VISIT (OUTPATIENT)
Dept: PHYSICAL THERAPY | Facility: CLINIC | Age: 40
End: 2025-06-18
Attending: NURSE PRACTITIONER
Payer: COMMERCIAL

## 2025-06-18 ENCOUNTER — PATIENT MESSAGE (OUTPATIENT)
Dept: FAMILY MEDICINE CLINIC | Facility: CLINIC | Age: 40
End: 2025-06-18

## 2025-06-18 DIAGNOSIS — S76.211A STRAIN OF RIGHT GROIN: Primary | ICD-10-CM

## 2025-06-18 DIAGNOSIS — I10 PRIMARY HYPERTENSION: Primary | ICD-10-CM

## 2025-06-18 PROCEDURE — 97110 THERAPEUTIC EXERCISES: CPT | Performed by: PHYSICAL MEDICINE & REHABILITATION

## 2025-06-18 NOTE — PROGRESS NOTES
"Daily Note     Today's date: 2025  Patient name: Ines Solares  : 1985  MRN: [id number]  Referring provider: Brooke Silveira CRNP  Dx:   Encounter Diagnosis     ICD-10-CM    1. Strain of right groin  S76.211A                      Subjective: Pt states her pain varies and today it is 4/10. Notes there is no reson for fluctuation in pain has she has not been doing anything physically stressful.      Objective: See treatment diary below      Assessment: Tolerated treatment well. Patient demonstrated fatigue post treatment, exhibited good technique with therapeutic exercises, and would benefit from continued PT  It appears pt has symptoms of arthritis R hip moreso than a groin pull. Pt has good days and bad days which is more consistent with arthritis.    Plan: Continue per plan of care.      Precautions: DM 2, HTN, GERD    Re-eval Date:     Date 6. 6   Visit Count 11 12 13 9 10   FOTO  Comp       Pain In /10 R groin  5/10 R knee  1/10  \"Not bad\" 4/10   R groin  3-4 R Groin painful  \"5-6\"/10   Pain Out 3-/10 R groin  3/10 R knee    No neg changes Increased pain Less pain/sx post MHP applic         Manuals                                     Neuro Re-Ed 6.    Side step on aeromat *hold this visit Aeromat  5 laps Aeromat  5 laps Aeromat x 6 laps Cont NV   Monster walks                                                Ther Ex 6. 5.7   Nustep L2  10' L3  10'    SRC L3 10'      L3 x 10' L2  10'   Ham stretch R longseated  4 x 30\" R longseated  4 x 30\" R longseated  4 x 30\" R long seated 30\" 4x R longseated  4 x 30\"   Supine BKFO with stretch 25 x 5\" 25 x 5\" 25 x 5\" 5\" 25x 25 x 5\"   SLRs  1x10 R R 30x    R 4x10 4x10 1#  30x   Add squeeze w/ball 30 x 5\" 30x5\" 40 x 5\" 5\" 40x 40 x 5\"   Bridges w/ball 25x/ 3\" 25 x 5\" 30 x 3\" 3\" 25x 25x 3\"   Leg press 90#  30x 90#  30x 95#  30x    90#  30x   Standing 4 way SLR 20x ea R 25x ea R 30x ea R R 30x ea 30x " "ea R   Groin stretch 4 x 20\"  4x/20\" 4 x 20\" NV 4 x 20\"   1/2 squat-wide stance *hold this visit 20x 20x 20x 25x   Hip flexor, groin stretch Hip flex/groin stretch w/strap assist 3x/20\" Hip flex/groin stretch w/strap assist 3x/20\" Hip flex/groin stretch w/strap assist 3x/20\" NV Hip flex/groin stretch w/strap assist 3x/20\"                                   Ther Activity                        Gait Training                        Modalities 6.13   5/29     MHP to R Groin   10 min   declined  MHP to R groin 10 min MHP to R Groin   10 min    No adverse reaction    **CP to R knee  10 min                                  " Yes

## 2025-06-19 ENCOUNTER — TELEPHONE (OUTPATIENT)
Dept: HEMATOLOGY ONCOLOGY | Facility: CLINIC | Age: 40
End: 2025-06-19

## 2025-06-19 ENCOUNTER — PATIENT MESSAGE (OUTPATIENT)
Dept: FAMILY MEDICINE CLINIC | Facility: CLINIC | Age: 40
End: 2025-06-19

## 2025-06-19 DIAGNOSIS — I10 PRIMARY HYPERTENSION: ICD-10-CM

## 2025-06-19 NOTE — TELEPHONE ENCOUNTER
Let message to let patient know her appointment with dr baker has been rescheduled from 08/13/2025 to 09/16/2025 @ 2:00. Hopline provided.

## 2025-06-24 ENCOUNTER — APPOINTMENT (OUTPATIENT)
Dept: PHYSICAL THERAPY | Facility: CLINIC | Age: 40
End: 2025-06-24
Attending: NURSE PRACTITIONER
Payer: COMMERCIAL

## 2025-06-25 ENCOUNTER — OFFICE VISIT (OUTPATIENT)
Dept: PHYSICAL THERAPY | Facility: CLINIC | Age: 40
End: 2025-06-25
Attending: NURSE PRACTITIONER
Payer: COMMERCIAL

## 2025-06-25 DIAGNOSIS — S76.211A STRAIN OF RIGHT GROIN: Primary | ICD-10-CM

## 2025-06-25 PROCEDURE — 97110 THERAPEUTIC EXERCISES: CPT | Performed by: PHYSICAL MEDICINE & REHABILITATION

## 2025-06-25 NOTE — PROGRESS NOTES
"Daily Note     Today's date: 2025  Patient name: Ines Solares  : 1985  MRN: [id number]  Referring provider: Brooke Silveira CRNP  Dx:   Encounter Diagnosis     ICD-10-CM    1. Strain of right groin  S76.211A                      Subjective: Pt notes she feels she has reachedt a plateau in status.  Notes her pain does not seem to be improving any longer and is just staying about the same. States she does not have a return apptmt to see her PCP.      Objective: See treatment diary below      Assessment: Tolerated treatment well. Patient exhibited good technique with therapeutic exercises and would benefit from continued PT  Pt may benefit from further testing to see if there is an underlying issue causing persistent pain in her hip/groin.       Plan: Continue per plan of care.      Precautions: DM 2, HTN, GERD    Re-eval Date:     Date . 6   Visit Count 11 12 13 14 10   FOTO  Comp       Pain In 10 R groin  5/10 R knee  1/10  \"Not bad\" 10   R groin  3/10 R Groin painful  \"5-6\"/10   Pain Out 3-4/10 R groin  3/10 R knee    No neg changes  Less pain/sx post MHP applic         Manuals                                     Neuro Re-Ed .      Side step on aeromat *hold this visit Aeromat  5 laps Aeromat  5 laps Aeromat x 6 laps Cont NV   Monster walks                                                Ther Ex 6. 5.7   Nustep L2  10' L3  10'    SRC L3 10'      L3 x 10' L2  10'   Ham stretch R longseated  4 x 30\" R longseated  4 x 30\" R longseated  4 x 30\" R long seated   4x30\" R longseated  4 x 30\"   Supine BKFO with stretch 25 x 5\" 25 x 5\" 25 x 5\" 5\" 25x 25 x 5\"   SLRs  1x10 R R 30x    R 4x10 1#  4x10 1#  30x   Add squeeze w/ball 30 x 5\" 30x5\" 40 x 5\" 5\" 40x 40 x 5\"   Bridges w/ball 25x/ 3\" 25 x 5\" 30 x 3\" 3\" 30x 25x 3\"   Leg press 90#  30x 90#  30x 95#  30x   100#  30x 90#  30x   Standing 4 way SLR 20x ea R 25x ea R 30x ea R R 40x ea 30x ea R " "  Groin stretch 4 x 20\"  4x/20\" 4 x 20\" 4 x 20'' 4 x 20\"   1/2 squat-wide stance *hold this visit 20x 20x 25x 25x   Hip flexor, groin stretch Hip flex/groin stretch w/strap assist 3x/20\" Hip flex/groin stretch w/strap assist 3x/20\" Hip flex/groin stretch w/strap assist 3x/20\" Hip flex/groin stretch w/strap assist 4x/20'' Hip flex/groin stretch w/strap assist 3x/20\"                                   Ther Activity                        Gait Training                        Modalities 6.13        MHP to R Groin   10 min   declined   MHP to R Groin   10 min    No adverse reaction    **CP to R knee  10 min                                    "

## 2025-06-27 ENCOUNTER — OFFICE VISIT (OUTPATIENT)
Dept: PHYSICAL THERAPY | Facility: CLINIC | Age: 40
End: 2025-06-27
Attending: NURSE PRACTITIONER
Payer: COMMERCIAL

## 2025-06-27 DIAGNOSIS — S76.211A STRAIN OF RIGHT GROIN: Primary | ICD-10-CM

## 2025-06-27 PROCEDURE — 97110 THERAPEUTIC EXERCISES: CPT

## 2025-06-27 NOTE — PROGRESS NOTES
"Daily Note     Today's date: 2025  Patient name: Ines Solares  : 1985  MRN: [id number]  Referring provider: Brooke Silveira CRNP  Dx:   Encounter Diagnosis     ICD-10-CM    1. Strain of right groin  S76.211A                      Subjective:  Pt. Reports continued sx @ R groin region.  States she will contact PCP re: 1* therapist's recommendation for possible imaging @ R hip to see if groin issues are related.       Objective: See treatment diary below      Assessment: Tolerated treatment Fairly Well overall with performance of ther exer and stretching.  Good benefits from MHP applic.  Patient would benefit from continued PT.      Plan: Con't services 2x/week as per POC/Goals.              Precautions: DM 2, HTN, GERD    Re-eval Date:     Date . 6.27   Visit Count 11 12 13 14 15   FOTO  Comp       Pain In 7/10 R groin  5/10 R knee  /10  \"Not bad\" /10   R groin  3/10 R Groin 3-4/10   Pain Out 3-10 R groin  3/10 R knee    No neg changes  Less pain/sx post MHP applic         Manuals                                      Neuro Re-Ed 6.   6.27   Side step on aeromat *hold this visit Aeromat  5 laps Aeromat  5 laps Aeromat x 6 laps Aeromat x 6 laps   Monster walks                                                Ther Ex 6. 6.27   Nustep L2  10' L3  10'    SRC L3 10'      L3 x 10' L3  10'   Ham stretch R longseated  4 x 30\" R longseated  4 x 30\" R longseated  4 x 30\" R long seated   4x30\" R longseated  4 x 30\"   Supine BKFO with stretch 25 x 5\" 25 x 5\" 25 x 5\" 5\" 25x 30 x 5\"   SLRs  1x10 R R 30x    R 4x10 1#  4x10 1#  40x   Add squeeze w/ball 30 x 5\" 30x5\" 40 x 5\" 5\" 40x 45 x 5\"   Bridges w/ball 25x/ 3\" 25 x 5\" 30 x 3\" 3\" 30x 30x 3\"   Leg press 90#  30x 90#  30x 95#  30x   100#  30x 100#  30x   Standing 4 way SLR 20x ea R 25x ea R 30x ea R R 40x ea 40x ea R   Groin stretch 4 x 20\"  4x/20\" 4 x 20\" 4 x 20'' 4 x 20\"   1/2 squat-wide stance *hold this " "visit 20x 20x 25x 25x   Hip flexor, groin stretch Hip flex/groin stretch w/strap assist 3x/20\" Hip flex/groin stretch w/strap assist 3x/20\" Hip flex/groin stretch w/strap assist 3x/20\" Hip flex/groin stretch w/strap assist 4x/20'' Hip flex/groin stretch w/strap assist 4x/20\" R                                   Ther Activity                        Gait Training                        Modalities 6.13    6.27    MHP to R Groin   10 min   declined   MHP to R Groin   10 min    No adverse reaction    **CP to R knee  10 min                                      "

## 2025-06-28 DIAGNOSIS — J30.1 SEASONAL ALLERGIC RHINITIS DUE TO POLLEN: ICD-10-CM

## 2025-06-30 ENCOUNTER — OFFICE VISIT (OUTPATIENT)
Dept: PHYSICAL THERAPY | Facility: CLINIC | Age: 40
End: 2025-06-30
Attending: NURSE PRACTITIONER
Payer: COMMERCIAL

## 2025-06-30 DIAGNOSIS — S76.211A STRAIN OF RIGHT GROIN: Primary | ICD-10-CM

## 2025-06-30 PROCEDURE — 97110 THERAPEUTIC EXERCISES: CPT | Performed by: PHYSICAL MEDICINE & REHABILITATION

## 2025-06-30 RX ORDER — LEVOCETIRIZINE DIHYDROCHLORIDE 5 MG/1
5 TABLET, FILM COATED ORAL EVERY EVENING
Qty: 90 TABLET | Refills: 1 | Status: SHIPPED | OUTPATIENT
Start: 2025-06-30

## 2025-06-30 NOTE — PROGRESS NOTES
"Daily Note     Today's date: 2025  Patient name: Ines Solares  : 1985  MRN: [id number]  Referring provider: Brooke Silveira CRNP  Dx:   Encounter Diagnosis     ICD-10-CM    1. Strain of right groin  S76.211A                      Subjective: Pt has return apptmt to Brooke Silveira  25.     Objective: See treatment diary below      Assessment: Tolerated treatment well. Patient exhibited good technique with therapeutic exercises and would benefit from continued PT  Pt to cont 1 more visit prior to RTD. Pt will discuss cont'd PT vs DC with PCP.  Pt appears to have reached a plateau in status and would benefit from further testing to determine if there is an underlying issue such as arthritis or labral issue which is causing cont'd pain.      Plan: Continue per plan of care.      Precautions: DM 2, HTN, GERD    Re-eval Date:     Date  6.27   Visit Count 16 12 13 14 15   FOTO  Comp       Pain In 3/10  1/10  \"Not bad\" 4/10   R groin  3/10 R Groin 3-4/10   Pain Out     No neg changes  Less pain/sx post MHP applic         Manuals                                     Neuro Re-Ed    6.27   Side step on aeromat *hold this visit Aeromat  5 laps Aeromat  5 laps Aeromat x 6 laps Aeromat x 6 laps   Monster walks                                                Ther Ex  6.27   Nustep L4  10' L3  10'    SRC L3 10'      L3 x 10' L3  10'   Ham stretch R longseated  4 x 30\" R longseated  4 x 30\" R longseated  4 x 30\" R long seated   4x30\" R longseated  4 x 30\"   Supine BKFO with stretch 30 x 5\" 25 x 5\" 25 x 5\" 5\" 25x 30 x 5\"   SLRs  2# 30 R R 30x    R 4x10 1#  4x10 1#  40x   Add squeeze w/ball DC to HEP 30x5\" 40 x 5\" 5\" 40x 45 x 5\"   Bridges w/ball 30x/ 3\" 25 x 5\" 30 x 3\" 3\" 30x 30x 3\"   Leg press 105#  30x 90#  30x 95#  30x   100#  30x 100#  30x   Standing 4 way SLR 40x ea R 25x ea R 30x ea R R 40x ea 40x ea R   Groin stretch 4 x 20\"  4x/20\" 4 x 20\" 4 x " "20'' 4 x 20\"   1/2 squat-wide stance 25x 20x 20x 25x 25x   Hip flexor, groin stretch Hip flex/groin stretch w/strap assist 4x/20\" R Hip flex/groin stretch w/strap assist 3x/20\" Hip flex/groin stretch w/strap assist 3x/20\" Hip flex/groin stretch w/strap assist 4x/20'' Hip flex/groin stretch w/strap assist 4x/20\" R                                   Ther Activity                        Gait Training                        Modalities 6.13    6.27    MHP to R Groin   10 min   declined   MHP to R Groin   10 min    No adverse reaction    **CP to R knee  10 min                                        "

## 2025-07-02 ENCOUNTER — OFFICE VISIT (OUTPATIENT)
Dept: PHYSICAL THERAPY | Facility: CLINIC | Age: 40
End: 2025-07-02
Attending: NURSE PRACTITIONER
Payer: COMMERCIAL

## 2025-07-02 DIAGNOSIS — S76.211A STRAIN OF RIGHT GROIN: Primary | ICD-10-CM

## 2025-07-02 PROCEDURE — 97110 THERAPEUTIC EXERCISES: CPT

## 2025-07-02 NOTE — PROGRESS NOTES
"Daily Note     Today's date: 2025  Patient name: Ines Solares  : 1985  MRN: [id number]  Referring provider: Brooke Silveira CRNP  Dx:   Encounter Diagnosis     ICD-10-CM    1. Strain of right groin  S76.211A                      Subjective:   Pt. reports pain level = \"3-4\"/10 @ R groin at this present time.    Pt. anticipating PCP f/u on .      Objective: See treatment diary below      Assessment: Tolerated treatment Well overall with ther exer and self stretching performed.  No increase in pain level or sx. Good benefits from MHP applic. . Patient would benefit from continued PT.      Plan: Will con't services as per MD recommendations.           Precautions: DM 2, HTN, GERD    Re-eval Date:     Date  7.2  6.27   Visit Count 16 17 13 14 15   FOTO  Comp       Pain In 3/10  3-4/10   4/10   R groin  3/10 R Groin 3-10   Pain Out  No neg changes No neg changes  Less pain/sx post MHP applic         Manuals                                     Neuro Re-Ed  7.2   6.27   Side step on aeromat *hold this visit Aeromat  6 laps Aeromat  5 laps Aeromat x 6 laps Aeromat x 6 laps   Monster walks                                                Ther Ex  7.2  6.27   Nustep L4  10' L4  10'    SRC L3 10'      L3 x 10' L3  10'   Ham stretch R longseated  4 x 30\" R longseated  4 x 30\" R longseated  4 x 30\" R long seated   4x30\" R longseated  4 x 30\"   Supine BKFO with stretch 30 x 5\" 35 x 5\" 25 x 5\" 5\" 25x 30 x 5\"   SLRs  2# 30 R R 30x 2#    R 4x10 1#  4x10 1#  40x   Add squeeze w/ball DC to HEP  40 x 5\" 5\" 40x 45 x 5\"   Bridges w/ball 30x/ 3\" 35 x 5\" 30 x 3\" 3\" 30x 30x 3\"   Leg press 105#  30x 105#  30x 95#  30x   100#  30x 100#  30x   Standing 4 way SLR 40x ea R 3x15 ea R 30x ea R R 40x ea 40x ea R   Groin stretch 4 x 20\"  4x/20-30\" 4 x 20\" 4 x 20'' 4 x 20\"   1/2 squat-wide stance 25x 30x 20x 25x 25x   Hip flexor, groin stretch Hip flex/groin stretch w/strap assist " "4x/20\" R Hip flex/groin stretch w/strap assist 4x/20-30\" Hip flex/groin stretch w/strap assist 3x/20\" Hip flex/groin stretch w/strap assist 4x/20'' Hip flex/groin stretch w/strap assist 4x/20\" R                                   Ther Activity                        Gait Training                        Modalities 6.13 7.2   6.27    MHP to R Groin   10 min   MHP to R Groin   10 min   MHP to R Groin   10 min    No adverse reaction    **CP to R knee  10 min                                          "

## 2025-07-07 ENCOUNTER — OFFICE VISIT (OUTPATIENT)
Dept: FAMILY MEDICINE CLINIC | Facility: CLINIC | Age: 40
End: 2025-07-07
Payer: COMMERCIAL

## 2025-07-07 ENCOUNTER — APPOINTMENT (OUTPATIENT)
Dept: RADIOLOGY | Facility: CLINIC | Age: 40
End: 2025-07-07
Payer: COMMERCIAL

## 2025-07-07 ENCOUNTER — APPOINTMENT (OUTPATIENT)
Dept: PHYSICAL THERAPY | Facility: CLINIC | Age: 40
End: 2025-07-07
Attending: NURSE PRACTITIONER
Payer: COMMERCIAL

## 2025-07-07 VITALS
BODY MASS INDEX: 47.09 KG/M2 | OXYGEN SATURATION: 98 % | TEMPERATURE: 98.1 F | RESPIRATION RATE: 18 BRPM | SYSTOLIC BLOOD PRESSURE: 130 MMHG | HEART RATE: 71 BPM | DIASTOLIC BLOOD PRESSURE: 80 MMHG | WEIGHT: 293 LBS | HEIGHT: 66 IN

## 2025-07-07 DIAGNOSIS — M25.551 RIGHT HIP PAIN: ICD-10-CM

## 2025-07-07 DIAGNOSIS — M25.551 RIGHT HIP PAIN: Primary | ICD-10-CM

## 2025-07-07 PROCEDURE — 99213 OFFICE O/P EST LOW 20 MIN: CPT | Performed by: NURSE PRACTITIONER

## 2025-07-07 PROCEDURE — 73502 X-RAY EXAM HIP UNI 2-3 VIEWS: CPT

## 2025-07-07 NOTE — PROGRESS NOTES
"Name: Ines Solares      : 1985      MRN: [id number]  Encounter Provider: RACHELLE Ospina  Encounter Date: 2025   Encounter department: [facility]  :  Assessment & Plan  Right hip pain    Orders:    Ambulatory Referral to Orthopedic Surgery; Future          Depression Screening and Follow-up Plan: Patient was screened for depression during today's encounter. They screened negative with a PHQ-2 score of 0.        History of Present Illness   Here for f/u right hip pain- has been going to PT- pain is worsening. Now has pain in bilateral knees  Would like xray and ortho referral- discussed possible need for MRI      Review of Systems   Musculoskeletal:  Positive for arthralgias and gait problem.       Objective   /80   Pulse 71   Temp 98.1 °F (36.7 °C)   Resp 18   Ht 5' 6\" (1.676 m)   Wt 133 kg (293 lb)   SpO2 98%   BMI 47.29 kg/m²      Physical Exam  Vitals and nursing note reviewed.   Constitutional:       General: She is not in acute distress.     Appearance: Normal appearance. She is well-developed. She is not diaphoretic.   Pulmonary:      Effort: Pulmonary effort is normal. No respiratory distress.     Musculoskeletal:         General: Tenderness present.      Right hip: Tenderness (with internal rotation and straight leg raise) present.     Skin:     Coloration: Skin is not pale.     Neurological:      Mental Status: She is alert and oriented to person, place, and time.     Psychiatric:         Mood and Affect: Mood normal.         Speech: Speech normal.         Behavior: Behavior normal.         Thought Content: Thought content normal.         Judgment: Judgment normal.         "

## 2025-07-07 NOTE — PROGRESS NOTES
7/7 Phone call from pt to DC PT at this timeper PCP recs. Pt is to see ortho phys for further assessment.  DC PT

## 2025-07-14 ENCOUNTER — OFFICE VISIT (OUTPATIENT)
Dept: OBGYN CLINIC | Facility: CLINIC | Age: 40
End: 2025-07-14
Payer: COMMERCIAL

## 2025-07-14 VITALS — HEIGHT: 66 IN | BODY MASS INDEX: 47.09 KG/M2 | WEIGHT: 293 LBS

## 2025-07-14 DIAGNOSIS — M25.551 RIGHT HIP PAIN: Primary | ICD-10-CM

## 2025-07-14 PROCEDURE — 99204 OFFICE O/P NEW MOD 45 MIN: CPT | Performed by: STUDENT IN AN ORGANIZED HEALTH CARE EDUCATION/TRAINING PROGRAM

## 2025-07-14 NOTE — PROGRESS NOTES
[facility] Visit       Assessment & Plan  Right hip pain    Orders:    Ambulatory Referral to Orthopedic Surgery    MRI femur right w wo contrast; Future    I reviewed the history, exam, and imaging with the patient include today.  I did review the patient's radiographs which show mild hip osteoarthritis as well as cortical thickening on the medial aspect of the proximal femur concerning for possible stress reaction.  Tre, the patient has tenderness over the lateral aspect of the hip as well as posterior.  Pain does radiate down to the knee but not past.  She denies any numbness or tingling.  I discussed with the patient that her symptoms could be due to hip pain versus back pain.  I did discuss that given the cortical thickening seen on the x-ray would recommend an MRI of the femur to evaluate for possible stress response.  I did place an order for the MRI and will see the patient back in clinic after it is complete to discuss the results and further treatment options.  I did discuss that if there were no significant findings on the MRI we may consider a referral to spine and pain for evaluation of her lumbar spine which could be contributing to her symptoms as well. The patient demonstrated understanding of the discussion and was in agreement with the plan.  All of the questions were answered.  Patient can reach out to clinic with any questions or concerns at any time.    Follow up: MRI review  Imaging: none      Chief Complaint   Patient presents with    Right Hip - Pain     Started in March patient doing PT thought she had a groin pull worsened hip pain during PT       History of Present Illness:  The patient is a 40 y.o. female seen in clinic for right hip pain.  Patient states that her symptoms started in March.  She states that she first noticed right groin pain.  She states that it radiates to her knee both posteriorly and anteriorly.  She has been doing physical therapy without significant  improvement in her symptoms.  She has not taken any medications, had no injections, and no MRI today.  She is set symptoms are intermittent and there is no specific movement that exacerbates them.  She does state that her symptoms are exacerbated by physical therapy exercise including straight leg raise.  She denies any prior injuries to the right hip.    Hip Surgical History:  None    Past Medical, Social and Family History:  Past Medical History[1]  Past Surgical History[2]  Allergies[3]  Medications Ordered Prior to Encounter[4]  Social History     Socioeconomic History    Marital status: /Civil Union     Spouse name: Not on file    Number of children: 2    Years of education: Not on file    Highest education level: Not on file   Occupational History    Occupation: employed   Tobacco Use    Smoking status: Never    Smokeless tobacco: Never   Vaping Use    Vaping status: Never Used   Substance and Sexual Activity    Alcohol use: Not Currently     Comment: socially    Drug use: Never    Sexual activity: Yes     Partners: Male     Birth control/protection: None   Other Topics Concern    Not on file   Social History Narrative    Daily Caffeine Use- 2 cups of soda and coffee/hot tea on occasion     Social Drivers of Health     Financial Resource Strain: Not on file   Food Insecurity: Not on file   Transportation Needs: Not on file   Physical Activity: Not on file   Stress: Not on file   Social Connections: Unknown (6/18/2024)    Received from [facility]     How often do you feel lonely or isolated from those around you? (Adult - for ages 18 years and over): Not on file   Intimate Partner Violence: Not on file   Housing Stability: Not on file         I have reviewed the past medical, surgical, social and family history, medications and allergies as documented in the EMR.      Physical Exam:    Focused right Hip Exam:  - Skin: intact  - Dislocation/deformity: no  - ROM: full, mild pain with  flexion and IR as well as ER  - TTP greater trochanter: yes  - TTP SI joint: yes  - Harris test: negative  - Meka's test: negative  - Abduction: 5/5, painless  - Mild pain and discomfort with log roll    Back:    No TTP over lumbar spinous processes, paraspinal musculature  SLR with pain but no radiation     No calf tenderness to palpation     LE NV Exam:   +2 DP/PT pulses bilaterally  Foot warm, well perfused  Sensation intact to light touch L2-S1 bilaterally, SPN/DPN/tibial/saphenous/sural nerve distributions  Motor intact in SPN/DPN/TA nerve distributions      Hip Imaging:    X-rays of the right hip were obtained on 2025 and reviewed with the patient. Based on my independent evaluation, the imaging shows mild hip osteoarthritis as well as cortical thickening on the medial aspect of the proximal femoral shaft possibly representing a stress reaction.      Procedures:  None      This note was written with the use of dictation software. Please excuse any errors.      Scribe Attestation      I,:  Dipesh Stafford PA-C am acting as a scribe while in the presence of the attending physician.:       I,:  Toni Holman MD personally performed the services described in this documentation    as scribed in my presence.:                [1]   Past Medical History:  Diagnosis Date    Anxiety     Asthma     Difficulty using continuous positive airway pressure (CPAP) device 2023    Disease of thyroid gland     Excessive daytime sleepiness 2023    GERD (gastroesophageal reflux disease)     Hypertension     MRSA (methicillin resistant Staphylococcus aureus)     right leg & right leg inner thigh   [2]   Past Surgical History:  Procedure Laterality Date    BREAST BIOPSY Right     benign     SECTION      x1 live birth     SECTION      LEG SURGERY Right 2015    removal of MRSA    THYROIDECTOMY     [3]   Allergies  Allergen Reactions    Other      seasonal    Vancomycin    [4]   Current Outpatient  Medications on File Prior to Visit   Medication Sig Dispense Refill    albuterol (ProAir HFA) 90 mcg/act inhaler Inhale 2 puffs every 6 (six) hours as needed for wheezing 18 g 5    atorvastatin (LIPITOR) 10 mg tablet Take 1 tablet (10 mg total) by mouth daily 90 tablet 3    Blood Pressure Monitoring KIT Use in the morning 1 kit 0    diclofenac sodium (VOLTAREN) 50 mg EC tablet Take 1 tablet (50 mg total) by mouth 2 (two) times a day as needed (pain) 90 tablet 1    escitalopram (LEXAPRO) 20 mg tablet TAKE 1 TABLET DAILY 90 tablet 0    famotidine (PEPCID) 20 mg tablet TAKE 1 TABLET TWICE A  tablet 0    fluticasone (FLONASE) 50 mcg/act nasal spray 2 sprays into each nostril daily 48 g 3    hydroCHLOROthiazide 25 mg tablet Take 1 tablet (25 mg total) by mouth daily 90 tablet 3    levocetirizine (XYZAL) 5 MG tablet Take 1 tablet (5 mg total) by mouth every evening 90 tablet 1    lisinopril (ZESTRIL) 20 mg tablet Take 1 tablet (20 mg total) by mouth 2 (two) times a day 180 tablet 0    LOW-DOSE ASPIRIN PO       montelukast (SINGULAIR) 10 mg tablet Take 1 tablet (10 mg total) by mouth daily at bedtime 90 tablet 3    omeprazole (PriLOSEC) 20 mg delayed release capsule Take 1 capsule (20 mg total) by mouth daily 90 capsule 1    RA Vitamin B-12 100 MCG tablet TAKE 10 TABLETS BY MOUTH ONCE DAILY 90 tablet 3    semaglutide, 2 mg/dose, (Ozempic) 8 mg/ mL injection pen Inject 0.75 mL (2 mg total) under the skin every 7 days 9 mL 5    Synthroid 200 MCG tablet Take 1 tablet (200 mcg total) by mouth daily 90 tablet 1     No current facility-administered medications on file prior to visit.

## 2025-07-24 DIAGNOSIS — M25.551 RIGHT HIP PAIN: Primary | ICD-10-CM

## 2025-07-29 ENCOUNTER — HOSPITAL ENCOUNTER (OUTPATIENT)
Dept: MRI IMAGING | Facility: HOSPITAL | Age: 40
Discharge: HOME | End: 2025-07-29
Payer: COMMERCIAL

## 2025-07-29 DIAGNOSIS — M25.551 RIGHT HIP PAIN: ICD-10-CM

## 2025-07-29 PROCEDURE — 73718 MRI LOWER EXTREMITY W/O DYE: CPT

## 2025-08-06 ENCOUNTER — OFFICE VISIT (OUTPATIENT)
Dept: OBGYN CLINIC | Facility: CLINIC | Age: 40
End: 2025-08-06
Payer: COMMERCIAL

## 2025-08-06 VITALS — WEIGHT: 293 LBS | BODY MASS INDEX: 47.09 KG/M2 | HEIGHT: 66 IN

## 2025-08-06 DIAGNOSIS — R10.31 GROIN PAIN, RIGHT: Primary | ICD-10-CM

## 2025-08-06 DIAGNOSIS — M54.16 LUMBAR RADICULOPATHY: ICD-10-CM

## 2025-08-06 PROCEDURE — 99213 OFFICE O/P EST LOW 20 MIN: CPT | Performed by: STUDENT IN AN ORGANIZED HEALTH CARE EDUCATION/TRAINING PROGRAM

## 2025-08-15 ENCOUNTER — APPOINTMENT (OUTPATIENT)
Dept: LAB | Facility: CLINIC | Age: 40
End: 2025-08-15
Attending: INTERNAL MEDICINE
Payer: COMMERCIAL

## 2025-08-18 ENCOUNTER — NURSE TRIAGE (OUTPATIENT)
Age: 40
End: 2025-08-18

## 2025-08-18 ENCOUNTER — PATIENT MESSAGE (OUTPATIENT)
Dept: FAMILY MEDICINE CLINIC | Facility: CLINIC | Age: 40
End: 2025-08-18

## 2025-08-18 ENCOUNTER — OFFICE VISIT (OUTPATIENT)
Dept: SLEEP CENTER | Facility: CLINIC | Age: 40
End: 2025-08-18
Payer: COMMERCIAL

## 2025-08-18 VITALS
BODY MASS INDEX: 47.09 KG/M2 | SYSTOLIC BLOOD PRESSURE: 112 MMHG | OXYGEN SATURATION: 98 % | HEIGHT: 66 IN | HEART RATE: 82 BPM | WEIGHT: 293 LBS | RESPIRATION RATE: 16 BRPM | TEMPERATURE: 98.2 F | DIASTOLIC BLOOD PRESSURE: 76 MMHG

## 2025-08-18 DIAGNOSIS — A49.02 MRSA INFECTION: ICD-10-CM

## 2025-08-18 DIAGNOSIS — E66.813 CLASS 3 SEVERE OBESITY DUE TO EXCESS CALORIES WITH SERIOUS COMORBIDITY AND BODY MASS INDEX (BMI) OF 45.0 TO 49.9 IN ADULT: ICD-10-CM

## 2025-08-18 DIAGNOSIS — G47.33 OBSTRUCTIVE SLEEP APNEA TREATED WITH CONTINUOUS POSITIVE AIRWAY PRESSURE (CPAP): Primary | ICD-10-CM

## 2025-08-18 DIAGNOSIS — I10 HYPERTENSION, UNSPECIFIED TYPE: ICD-10-CM

## 2025-08-18 PROBLEM — G47.00 INSOMNIA: Status: RESOLVED | Noted: 2023-06-05 | Resolved: 2025-08-18

## 2025-08-18 PROCEDURE — 99214 OFFICE O/P EST MOD 30 MIN: CPT | Performed by: NURSE PRACTITIONER

## 2025-08-18 RX ORDER — SULFAMETHOXAZOLE AND TRIMETHOPRIM 800; 160 MG/1; MG/1
1 TABLET ORAL EVERY 12 HOURS SCHEDULED
Qty: 20 TABLET | Refills: 0 | Status: SHIPPED | OUTPATIENT
Start: 2025-08-18 | End: 2025-08-28

## 2025-08-19 ENCOUNTER — OFFICE VISIT (OUTPATIENT)
Dept: FAMILY MEDICINE CLINIC | Facility: CLINIC | Age: 40
End: 2025-08-19
Payer: COMMERCIAL

## 2025-08-19 ENCOUNTER — TELEPHONE (OUTPATIENT)
Dept: SLEEP CENTER | Facility: CLINIC | Age: 40
End: 2025-08-19

## 2025-08-19 VITALS
RESPIRATION RATE: 18 BRPM | OXYGEN SATURATION: 99 % | SYSTOLIC BLOOD PRESSURE: 122 MMHG | TEMPERATURE: 98.7 F | HEIGHT: 66 IN | BODY MASS INDEX: 47.09 KG/M2 | WEIGHT: 293 LBS | HEART RATE: 83 BPM | DIASTOLIC BLOOD PRESSURE: 82 MMHG

## 2025-08-19 DIAGNOSIS — Z86.14 HISTORY OF MRSA INFECTION: ICD-10-CM

## 2025-08-19 DIAGNOSIS — A49.02 MRSA INFECTION: Primary | ICD-10-CM

## 2025-08-19 PROCEDURE — 87205 SMEAR GRAM STAIN: CPT | Performed by: NURSE PRACTITIONER

## 2025-08-19 PROCEDURE — 87070 CULTURE OTHR SPECIMN AEROBIC: CPT | Performed by: NURSE PRACTITIONER

## 2025-08-19 PROCEDURE — 87186 SC STD MICRODIL/AGAR DIL: CPT | Performed by: NURSE PRACTITIONER

## 2025-08-19 PROCEDURE — 87147 CULTURE TYPE IMMUNOLOGIC: CPT | Performed by: NURSE PRACTITIONER

## 2025-08-19 PROCEDURE — 99213 OFFICE O/P EST LOW 20 MIN: CPT | Performed by: NURSE PRACTITIONER

## 2025-08-19 RX ORDER — GINSENG 100 MG
CAPSULE ORAL
Qty: 28 G | Refills: 5 | Status: SHIPPED | OUTPATIENT
Start: 2025-08-19